# Patient Record
Sex: MALE | Race: WHITE | NOT HISPANIC OR LATINO | Employment: OTHER | URBAN - NONMETROPOLITAN AREA
[De-identification: names, ages, dates, MRNs, and addresses within clinical notes are randomized per-mention and may not be internally consistent; named-entity substitution may affect disease eponyms.]

---

## 2018-03-01 ENCOUNTER — OFFICE VISIT (OUTPATIENT)
Dept: UROLOGY | Facility: CLINIC | Age: 63
End: 2018-03-01

## 2018-03-01 VITALS — WEIGHT: 175 LBS | HEIGHT: 70 IN | BODY MASS INDEX: 25.05 KG/M2

## 2018-03-01 DIAGNOSIS — N50.82 SCROTAL PAIN: Primary | ICD-10-CM

## 2018-03-01 DIAGNOSIS — R35.0 URINARY FREQUENCY: ICD-10-CM

## 2018-03-01 DIAGNOSIS — N45.1 EPIDIDYMITIS, LEFT: ICD-10-CM

## 2018-03-01 DIAGNOSIS — N42.9 DISORDER OF PROSTATE: ICD-10-CM

## 2018-03-01 LAB
BILIRUB BLD-MCNC: NEGATIVE MG/DL
CLARITY, POC: CLEAR
COLOR UR: YELLOW
GLUCOSE UR STRIP-MCNC: NEGATIVE MG/DL
KETONES UR QL: NEGATIVE
LEUKOCYTE EST, POC: NEGATIVE
NITRITE UR-MCNC: NEGATIVE MG/ML
PH UR: 5.5 [PH] (ref 5–8)
PROT UR STRIP-MCNC: NEGATIVE MG/DL
RBC # UR STRIP: NEGATIVE /UL
SP GR UR: 1.03 (ref 1–1.03)
UROBILINOGEN UR QL: NORMAL

## 2018-03-01 PROCEDURE — 51798 US URINE CAPACITY MEASURE: CPT | Performed by: UROLOGY

## 2018-03-01 PROCEDURE — 81003 URINALYSIS AUTO W/O SCOPE: CPT | Performed by: UROLOGY

## 2018-03-01 PROCEDURE — 99214 OFFICE O/P EST MOD 30 MIN: CPT | Performed by: UROLOGY

## 2018-03-01 RX ORDER — TAMSULOSIN HYDROCHLORIDE 0.4 MG/1
1 CAPSULE ORAL NIGHTLY
Qty: 30 CAPSULE | Refills: 11 | Status: SHIPPED | OUTPATIENT
Start: 2018-03-01 | End: 2019-09-24

## 2018-03-01 RX ORDER — SULFAMETHOXAZOLE AND TRIMETHOPRIM 800; 160 MG/1; MG/1
1 TABLET ORAL 2 TIMES DAILY
Qty: 60 TABLET | Refills: 0 | Status: SHIPPED | OUTPATIENT
Start: 2018-03-01 | End: 2018-06-28 | Stop reason: HOSPADM

## 2018-03-01 RX ORDER — TAMSULOSIN HYDROCHLORIDE 0.4 MG/1
1 CAPSULE ORAL NIGHTLY
Qty: 30 CAPSULE | Refills: 11 | Status: SHIPPED | OUTPATIENT
Start: 2018-03-01 | End: 2018-03-01 | Stop reason: SDUPTHER

## 2018-03-01 RX ORDER — SULFAMETHOXAZOLE AND TRIMETHOPRIM 800; 160 MG/1; MG/1
1 TABLET ORAL 2 TIMES DAILY
Qty: 60 TABLET | Refills: 0 | Status: SHIPPED | OUTPATIENT
Start: 2018-03-01 | End: 2018-03-01 | Stop reason: SDUPTHER

## 2018-03-01 NOTE — PROGRESS NOTES
Chief Complaint:          Pt has left scrotal pain and down left leg and left back pain 8 days    HPI:   62 y.o. male.    HPI  Post void residual of 0 cc.  Pt has some hesitancy.  He has nocturia x4.  His UA today is negative.      Past Medical History:        Past Medical History:   Diagnosis Date   • Hypertension    • Kidney stone          Current Meds:     Current Outpatient Prescriptions   Medication Sig Dispense Refill   • HYDROcodone-acetaminophen (NORCO) 5-325 MG per tablet Take 1 tablet by mouth every 6 (six) hours as needed for moderate pain (4-6). 8 tablet 0   • ketorolac (TORADOL) 10 MG tablet Take 1 tablet by mouth every 6 (six) hours as needed for moderate pain (4-6). 8 tablet 0   • ondansetron ODT (ZOFRAN-ODT) 4 MG disintegrating tablet Take 1 tablet by mouth 4 (four) times a day as needed for nausea or vomiting. 12 tablet 0   • tamsulosin (FLOMAX) 0.4 MG capsule 24 hr capsule Take 1 capsule by mouth daily. 30 capsule 0     No current facility-administered medications for this visit.         Allergies:      No Known Allergies     Past Surgical History:     History reviewed. No pertinent surgical history.      Social History:     Social History     Social History   • Marital status:      Spouse name: N/A   • Number of children: N/A   • Years of education: N/A     Occupational History   • Not on file.     Social History Main Topics   • Smoking status: Never Smoker   • Smokeless tobacco: Not on file   • Alcohol use No   • Drug use: No   • Sexual activity: Yes     Partners: Female     Other Topics Concern   • Not on file     Social History Narrative       Family History:     History reviewed. No pertinent family history.    Review of Systems:     Review of Systems   Constitutional: Negative for chills, fatigue and fever.   HENT: Negative for congestion and sinus pain.    Eyes: Negative for pain.   Respiratory: Negative for cough, chest tightness and shortness of breath.    Gastrointestinal: Negative  for abdominal pain, constipation, diarrhea, nausea and vomiting.   Endocrine: Negative for cold intolerance and heat intolerance.   Genitourinary: Positive for difficulty urinating, frequency and testicular pain. Negative for dysuria and urgency.   Musculoskeletal: Positive for back pain. Negative for neck pain.   Skin: Negative for rash.   Neurological: Negative for dizziness and headaches.   Hematological: Does not bruise/bleed easily.   Psychiatric/Behavioral: The patient is nervous/anxious.        The following portions of the patient's history were reviewed and updated as appropriate:allergies, current medications, past family history, past medical history, past social history, past surgical history, problem list and ROS  Physical Exam:     Physical Exam   Constitutional: He is oriented to person, place, and time.   HENT:   Head: Normocephalic and atraumatic.   Right Ear: External ear normal.   Left Ear: External ear normal.   Nose: Nose normal.   Mouth/Throat: Oropharynx is clear and moist.   Eyes: Conjunctivae and EOM are normal. Pupils are equal, round, and reactive to light.   Neck: Normal range of motion. Neck supple. No thyromegaly present.   Cardiovascular: Normal rate, regular rhythm, normal heart sounds and intact distal pulses.    No murmur heard.  Pulmonary/Chest: Effort normal and breath sounds normal. No respiratory distress. He has no wheezes. He has no rales. He exhibits no tenderness.   Abdominal: Soft. Bowel sounds are normal. He exhibits no distension and no mass. There is no tenderness. No hernia.   Genitourinary: Rectum normal, prostate normal and penis normal.   Genitourinary Comments: Tender epididymis vs mass.   Musculoskeletal: Normal range of motion. He exhibits no edema or tenderness.   Lymphadenopathy:     He has no cervical adenopathy.   Neurological: He is alert and oriented to person, place, and time. No cranial nerve deficit. He exhibits normal muscle tone. Coordination normal.  "  Skin: Skin is warm. No rash noted.   Psychiatric: He has a normal mood and affect. His behavior is normal. Judgment and thought content normal.   Nursing note and vitals reviewed.      Ht 177.8 cm (70\")  Wt 79.4 kg (175 lb)  BMI 25.11 kg/m2   Procedure:         Assessment:     Encounter Diagnoses   Name Primary?   • Urinary frequency    • Disorder of prostate    • Scrotal pain Yes   • Epididymitis, left      Orders Placed This Encounter   Procedures   • US Scrotum & Testicles     Standing Status:   Future     Standing Expiration Date:   3/1/2019     Order Specific Question:   Reason for Exam:     Answer:   scrotal pain with epididymal mass on left.   • PSA DIAGNOSTIC     Standing Status:   Future     Standing Expiration Date:   3/1/2019   • Bladder Scan   • POC Urinalysis Dipstick, Automated       Plan:   Will check a scrotal ultrasound and will start him on septra and will see him back in 4 weeks.    Counseling was given to patient for the following topics instructions for management as follows: epididymitis. The interim medical history and current results were reviewed.  A treatment plan with follow-up was made for Scrotal pain [N50.82]. I spent 26 minutes face to face with Evan Salazar and 86 percentage was spent in counseling.    Patient's BMI is within normal parameters. No follow-up required.    This document has been electronically signed by Edvin Mars MD March 1, 2018 5:00 PM  "

## 2018-03-06 ENCOUNTER — LAB (OUTPATIENT)
Dept: UROLOGY | Facility: CLINIC | Age: 63
End: 2018-03-06

## 2018-03-06 DIAGNOSIS — N42.9 DISORDER OF PROSTATE: ICD-10-CM

## 2018-03-06 LAB — PSA SERPL-MCNC: 0.67 NG/ML (ref 0–4)

## 2018-03-06 PROCEDURE — 36415 COLL VENOUS BLD VENIPUNCTURE: CPT | Performed by: UROLOGY

## 2018-03-06 PROCEDURE — 36415 COLL VENOUS BLD VENIPUNCTURE: CPT

## 2018-03-06 PROCEDURE — 84153 ASSAY OF PSA TOTAL: CPT | Performed by: UROLOGY

## 2018-03-09 ENCOUNTER — TELEPHONE (OUTPATIENT)
Dept: UROLOGY | Facility: CLINIC | Age: 63
End: 2018-03-09

## 2018-03-14 ENCOUNTER — TELEPHONE (OUTPATIENT)
Dept: UROLOGY | Facility: CLINIC | Age: 63
End: 2018-03-14

## 2018-03-14 NOTE — TELEPHONE ENCOUNTER
Patient called stating that Bactrim was causing a rash and flu-like symptoms. Per Dr. Mars, Cipro 250 mg BID #28 with no refills was called into patient's pharmacy and patient was told to stop taking Bactrim. Patient is aware of prescription and verbalized understanding.

## 2018-03-29 ENCOUNTER — HOSPITAL ENCOUNTER (OUTPATIENT)
Dept: ULTRASOUND IMAGING | Facility: HOSPITAL | Age: 63
Discharge: HOME | End: 2018-03-29
Attending: UROLOGY | Admitting: UROLOGY

## 2018-03-29 DIAGNOSIS — N45.1 EPIDIDYMITIS, LEFT: ICD-10-CM

## 2018-03-29 DIAGNOSIS — N50.82 SCROTAL PAIN: ICD-10-CM

## 2018-03-29 PROCEDURE — 76870 US EXAM SCROTUM: CPT

## 2018-03-29 PROCEDURE — 76870 US EXAM SCROTUM: CPT | Performed by: RADIOLOGY

## 2018-04-03 ENCOUNTER — OFFICE VISIT (OUTPATIENT)
Dept: UROLOGY | Facility: CLINIC | Age: 63
End: 2018-04-03

## 2018-04-03 VITALS — HEIGHT: 70 IN | WEIGHT: 176 LBS | BODY MASS INDEX: 25.2 KG/M2

## 2018-04-03 DIAGNOSIS — N45.1 EPIDIDYMITIS, LEFT: Primary | ICD-10-CM

## 2018-04-03 PROCEDURE — 99214 OFFICE O/P EST MOD 30 MIN: CPT | Performed by: UROLOGY

## 2018-04-03 NOTE — PROGRESS NOTES
Chief Complaint:          Scrotal pain    HPI:   63 y.o. male.    HPI  Pt had a reaction to sulfa.  We changed him to cipro.  Pt is much improved about 80 to 90% improved.  Pt has an hypoechoic lesion in the lower pole region of the testes.    Past Medical History:        Past Medical History:   Diagnosis Date   • Hypertension    • Kidney stone          Current Meds:     Current Outpatient Prescriptions   Medication Sig Dispense Refill   • HYDROcodone-acetaminophen (NORCO) 5-325 MG per tablet Take 1 tablet by mouth every 6 (six) hours as needed for moderate pain (4-6). 8 tablet 0   • ketorolac (TORADOL) 10 MG tablet Take 1 tablet by mouth every 6 (six) hours as needed for moderate pain (4-6). 8 tablet 0   • ondansetron ODT (ZOFRAN-ODT) 4 MG disintegrating tablet Take 1 tablet by mouth 4 (four) times a day as needed for nausea or vomiting. 12 tablet 0   • sulfamethoxazole-trimethoprim (BACTRIM DS,SEPTRA DS) 800-160 MG per tablet Take 1 tablet by mouth 2 (Two) Times a Day. 60 tablet 0   • tamsulosin (FLOMAX) 0.4 MG capsule 24 hr capsule Take 1 capsule by mouth Every Night. 30 capsule 11     No current facility-administered medications for this visit.         Allergies:      Allergies   Allergen Reactions   • Bactrim [Sulfamethoxazole-Trimethoprim] Rash and Other (See Comments)     Flu like symptoms        Past Surgical History:     History reviewed. No pertinent surgical history.      Social History:     Social History     Social History   • Marital status:      Spouse name: N/A   • Number of children: N/A   • Years of education: N/A     Occupational History   • Not on file.     Social History Main Topics   • Smoking status: Never Smoker   • Smokeless tobacco: Not on file   • Alcohol use No   • Drug use: No   • Sexual activity: Yes     Partners: Female     Other Topics Concern   • Not on file     Social History Narrative   • No narrative on file       Family History:     History reviewed. No pertinent family  history.    Review of Systems:     Review of Systems   Constitutional: Positive for fatigue. Negative for fever.   HENT: Negative for sinus pain.    Eyes: Negative for pain.   Respiratory: Negative for chest tightness.    Cardiovascular: Negative for chest pain.   Gastrointestinal: Negative for abdominal pain.   Endocrine: Negative for heat intolerance.   Genitourinary: Negative for frequency.   Musculoskeletal: Negative for back pain.   Skin: Negative for rash.   Allergic/Immunologic: Negative for food allergies.   Neurological: Negative for headaches.   Hematological: Does not bruise/bleed easily.   Psychiatric/Behavioral: The patient is not nervous/anxious.            The following portions of the patient's history were reviewed and updated as appropriate:allergies, current medications, past family history, past medical history, past social history, past surgical history, problem list and ROS  Physical Exam:     Physical Exam   Constitutional: He is oriented to person, place, and time.   HENT:   Head: Normocephalic and atraumatic.   Right Ear: External ear normal.   Left Ear: External ear normal.   Nose: Nose normal.   Mouth/Throat: Oropharynx is clear and moist.   Eyes: Conjunctivae and EOM are normal. Pupils are equal, round, and reactive to light.   Neck: Normal range of motion. Neck supple. No thyromegaly present.   Cardiovascular: Normal rate, regular rhythm, normal heart sounds and intact distal pulses.    No murmur heard.  Pulmonary/Chest: Effort normal and breath sounds normal. No respiratory distress. He has no wheezes. He has no rales. He exhibits no tenderness.   Abdominal: Soft. Bowel sounds are normal. He exhibits no distension and no mass. There is no tenderness. No hernia.   Genitourinary: Penis normal.   Genitourinary Comments: Left fullness epididymal/testicle mass with less tenderness.   Musculoskeletal: Normal range of motion. He exhibits no edema or tenderness.   Lymphadenopathy:     He has no  "cervical adenopathy.   Neurological: He is alert and oriented to person, place, and time. No cranial nerve deficit. He exhibits normal muscle tone. Coordination normal.   Skin: Skin is warm. No rash noted.   Psychiatric: He has a normal mood and affect. His behavior is normal. Judgment and thought content normal.   Nursing note and vitals reviewed.      Ht 177.8 cm (70\")   Wt 79.8 kg (176 lb)   BMI 25.25 kg/m²    Procedure:         Assessment:     Encounter Diagnosis   Name Primary?   • Epididymitis, left Yes     No orders of the defined types were placed in this encounter.      Plan:   I think the pt has resolving epididymitis.  His ultrasound findings are likely from this.  Will reexamine pt in 2 months and rescan his testicles in 3 months.    Discussed the patient's BMI with him. BMI is within normal parameters. No follow-up required.      Counseling was given to patient for the following topics instructions for management as follows: epididymitis. The interim medical history and current results were reviewed.  A treatment plan with follow-up was made for Epididymitis, left [N45.1]. I spent 35 minutes face to face with Evan Salazar and 70 percentage was spent in counseling.    This document has been electronically signed by Edvin Mars MD April 3, 2018 4:30 PM  "

## 2018-06-05 ENCOUNTER — OFFICE VISIT (OUTPATIENT)
Dept: UROLOGY | Facility: CLINIC | Age: 63
End: 2018-06-05

## 2018-06-05 ENCOUNTER — HOSPITAL ENCOUNTER (OUTPATIENT)
Dept: ULTRASOUND IMAGING | Facility: HOSPITAL | Age: 63
Discharge: HOME | End: 2018-06-05
Admitting: UROLOGY

## 2018-06-05 VITALS — WEIGHT: 176 LBS | HEIGHT: 70 IN | BODY MASS INDEX: 25.2 KG/M2

## 2018-06-05 DIAGNOSIS — N45.1 EPIDIDYMITIS, LEFT: ICD-10-CM

## 2018-06-05 DIAGNOSIS — N45.1 EPIDIDYMITIS, LEFT: Primary | ICD-10-CM

## 2018-06-05 PROCEDURE — 76870 US EXAM SCROTUM: CPT | Performed by: RADIOLOGY

## 2018-06-05 PROCEDURE — 76870 US EXAM SCROTUM: CPT

## 2018-06-05 PROCEDURE — 99213 OFFICE O/P EST LOW 20 MIN: CPT | Performed by: UROLOGY

## 2018-06-05 NOTE — PROGRESS NOTES
Chief Complaint:          Epididymitis left    HPI:   63 y.o. male.  Pt is feeling much better.  Pt was tender for about a month  HPI      Past Medical History:        Past Medical History:   Diagnosis Date   • Hypertension    • Kidney stone          Current Meds:     Current Outpatient Prescriptions   Medication Sig Dispense Refill   • HYDROcodone-acetaminophen (NORCO) 5-325 MG per tablet Take 1 tablet by mouth every 6 (six) hours as needed for moderate pain (4-6). 8 tablet 0   • ketorolac (TORADOL) 10 MG tablet Take 1 tablet by mouth every 6 (six) hours as needed for moderate pain (4-6). 8 tablet 0   • ondansetron ODT (ZOFRAN-ODT) 4 MG disintegrating tablet Take 1 tablet by mouth 4 (four) times a day as needed for nausea or vomiting. 12 tablet 0   • sulfamethoxazole-trimethoprim (BACTRIM DS,SEPTRA DS) 800-160 MG per tablet Take 1 tablet by mouth 2 (Two) Times a Day. 60 tablet 0   • tamsulosin (FLOMAX) 0.4 MG capsule 24 hr capsule Take 1 capsule by mouth Every Night. 30 capsule 11     No current facility-administered medications for this visit.         Allergies:      Allergies   Allergen Reactions   • Bactrim [Sulfamethoxazole-Trimethoprim] Rash and Other (See Comments)     Flu like symptoms        Past Surgical History:     History reviewed. No pertinent surgical history.      Social History:     Social History     Social History   • Marital status:      Spouse name: N/A   • Number of children: N/A   • Years of education: N/A     Occupational History   • Not on file.     Social History Main Topics   • Smoking status: Never Smoker   • Smokeless tobacco: Never Used   • Alcohol use No   • Drug use: No   • Sexual activity: Yes     Partners: Female     Other Topics Concern   • Not on file     Social History Narrative   • No narrative on file       Family History:     Family History   Problem Relation Age of Onset   • No Known Problems Father    • No Known Problems Mother        Review of Systems:     Review of  Systems   Constitutional: Negative for chills, fatigue and fever.   HENT: Negative for congestion and sinus pressure.    Respiratory: Negative for shortness of breath.    Cardiovascular: Negative for chest pain.   Gastrointestinal: Negative for abdominal pain, constipation, diarrhea, nausea and vomiting.   Genitourinary: Negative for frequency and urgency.   Musculoskeletal: Negative for back pain and neck pain.   Neurological: Negative for dizziness and headaches.   Hematological: Does not bruise/bleed easily.   Psychiatric/Behavioral: The patient is not nervous/anxious.            I have reviewed the follow portions of the patient's history and confirmed they are accurate today:  allergies, current medications, past family history, past medical history, past social history, past surgical history, problem list and ROS  Physical Exam:     Physical Exam   Constitutional: He is oriented to person, place, and time.   HENT:   Head: Normocephalic and atraumatic.   Right Ear: External ear normal.   Left Ear: External ear normal.   Nose: Nose normal.   Mouth/Throat: Oropharynx is clear and moist.   Eyes: Conjunctivae and EOM are normal. Pupils are equal, round, and reactive to light.   Neck: Normal range of motion. Neck supple. No thyromegaly present.   Cardiovascular: Normal rate, regular rhythm, normal heart sounds and intact distal pulses.    No murmur heard.  Pulmonary/Chest: Effort normal and breath sounds normal. No respiratory distress. He has no wheezes. He has no rales. He exhibits no tenderness.   Abdominal: Soft. Bowel sounds are normal. He exhibits no distension and no mass. There is no tenderness. No hernia.   Genitourinary: Rectum normal, prostate normal and penis normal.   Genitourinary Comments: Pt still has fluid about his left testicle and there is a firm area in the bottom posterior aspect of the left testicle.  It is not tender.   Musculoskeletal: Normal range of motion. He exhibits no edema or  "tenderness.   Lymphadenopathy:     He has no cervical adenopathy.   Neurological: He is alert and oriented to person, place, and time. No cranial nerve deficit. He exhibits normal muscle tone. Coordination normal.   Skin: Skin is warm. No rash noted.   Psychiatric: He has a normal mood and affect. His behavior is normal. Judgment and thought content normal.   Nursing note and vitals reviewed.      Ht 177.8 cm (70\")   Wt 79.8 kg (176 lb)   BMI 25.25 kg/m²    Procedure:         Assessment:     Encounter Diagnosis   Name Primary?   • Epididymitis, left Yes     No orders of the defined types were placed in this encounter.      Plan:   I think we should repeat his testicular ultrasound and return in a week and if it is persistent and the same as the one 3 months ago we should consider surgical exploration    Patient's Body mass index is 25.25 kg/m². BMI is within normal parameters. No follow-up required.      Counseling was given to patient for the following topics impressions as follows: hydrocele and testicular mass on the left. The interim medical history and current results were reviewed.  A treatment plan with follow-up was made for Epididymitis, left [N45.1].  This document has been electronically signed by Edvin Mars MD June 5, 2018 3:18 PM  "

## 2018-06-12 ENCOUNTER — OFFICE VISIT (OUTPATIENT)
Dept: UROLOGY | Facility: CLINIC | Age: 63
End: 2018-06-12

## 2018-06-12 VITALS — BODY MASS INDEX: 25.2 KG/M2 | HEIGHT: 70 IN | WEIGHT: 176 LBS

## 2018-06-12 DIAGNOSIS — N50.89 MASS OF LEFT TESTICLE: Primary | ICD-10-CM

## 2018-06-12 LAB
HCG SERPL QL: NEGATIVE
LDH SERPL-CCNC: 194 U/L (ref 135–225)

## 2018-06-12 PROCEDURE — 84703 CHORIONIC GONADOTROPIN ASSAY: CPT | Performed by: UROLOGY

## 2018-06-12 PROCEDURE — 82105 ALPHA-FETOPROTEIN SERUM: CPT | Performed by: UROLOGY

## 2018-06-12 PROCEDURE — 83615 LACTATE (LD) (LDH) ENZYME: CPT | Performed by: UROLOGY

## 2018-06-12 PROCEDURE — 99214 OFFICE O/P EST MOD 30 MIN: CPT | Performed by: UROLOGY

## 2018-06-12 PROCEDURE — 36415 COLL VENOUS BLD VENIPUNCTURE: CPT | Performed by: UROLOGY

## 2018-06-12 NOTE — PROGRESS NOTES
Chief Complaint:          Firmness on exam and ultrasound repeated.    HPI:   63 y.o. male.    HPI  Ultrasound shows 14 mm lower pole echo pattern. It has not improved ultrasonically    Past Medical History:        Past Medical History:   Diagnosis Date   • Hypertension    • Kidney stone          Current Meds:     Current Outpatient Prescriptions   Medication Sig Dispense Refill   • HYDROcodone-acetaminophen (NORCO) 5-325 MG per tablet Take 1 tablet by mouth every 6 (six) hours as needed for moderate pain (4-6). 8 tablet 0   • ketorolac (TORADOL) 10 MG tablet Take 1 tablet by mouth every 6 (six) hours as needed for moderate pain (4-6). 8 tablet 0   • ondansetron ODT (ZOFRAN-ODT) 4 MG disintegrating tablet Take 1 tablet by mouth 4 (four) times a day as needed for nausea or vomiting. 12 tablet 0   • sulfamethoxazole-trimethoprim (BACTRIM DS,SEPTRA DS) 800-160 MG per tablet Take 1 tablet by mouth 2 (Two) Times a Day. 60 tablet 0   • tamsulosin (FLOMAX) 0.4 MG capsule 24 hr capsule Take 1 capsule by mouth Every Night. 30 capsule 11     No current facility-administered medications for this visit.         Allergies:      Allergies   Allergen Reactions   • Bactrim [Sulfamethoxazole-Trimethoprim] Rash and Other (See Comments)     Flu like symptoms        Past Surgical History:     History reviewed. No pertinent surgical history.      Social History:     Social History     Social History   • Marital status:      Spouse name: N/A   • Number of children: N/A   • Years of education: N/A     Occupational History   • Not on file.     Social History Main Topics   • Smoking status: Never Smoker   • Smokeless tobacco: Never Used   • Alcohol use No   • Drug use: No   • Sexual activity: Yes     Partners: Female     Other Topics Concern   • Not on file     Social History Narrative   • No narrative on file       Family History:     Family History   Problem Relation Age of Onset   • No Known Problems Father    • No Known Problems  Mother        Review of Systems:     Review of Systems   Constitutional: Negative for chills, fatigue and fever.   HENT: Negative for congestion and sinus pressure.    Respiratory: Negative for shortness of breath.    Cardiovascular: Negative for chest pain.   Gastrointestinal: Negative for abdominal pain, constipation, diarrhea, nausea and vomiting.   Genitourinary: Negative for frequency and urgency.   Musculoskeletal: Negative for back pain and neck pain.   Neurological: Negative for dizziness and headaches.   Hematological: Does not bruise/bleed easily.   Psychiatric/Behavioral: The patient is not nervous/anxious.            I have reviewed the follow portions of the patient's history and confirmed they are accurate today:  allergies, current medications, past family history, past medical history, past social history, past surgical history, problem list and ROS  Physical Exam:     Physical Exam   Constitutional: He is oriented to person, place, and time.   HENT:   Head: Normocephalic and atraumatic.   Right Ear: External ear normal.   Left Ear: External ear normal.   Nose: Nose normal.   Mouth/Throat: Oropharynx is clear and moist.   Eyes: Conjunctivae and EOM are normal. Pupils are equal, round, and reactive to light.   Neck: Normal range of motion. Neck supple. No thyromegaly present.   Cardiovascular: Normal rate, regular rhythm, normal heart sounds and intact distal pulses.    No murmur heard.  Pulmonary/Chest: Effort normal and breath sounds normal. No respiratory distress. He has no wheezes. He has no rales. He exhibits no tenderness.   Abdominal: Soft. Bowel sounds are normal. He exhibits no distension and no mass. There is no tenderness. No hernia.   Genitourinary: Rectum normal, prostate normal and penis normal.   Genitourinary Comments: Lower pole left testicle mass about 2 cm.  Hydrocele about left testicle   Musculoskeletal: Normal range of motion. He exhibits no edema or tenderness.  "  Lymphadenopathy:     He has no cervical adenopathy.   Neurological: He is alert and oriented to person, place, and time. No cranial nerve deficit. He exhibits normal muscle tone. Coordination normal.   Skin: Skin is warm. No rash noted.   Psychiatric: He has a normal mood and affect. His behavior is normal. Judgment and thought content normal.   Nursing note and vitals reviewed.      Ht 177.8 cm (70\")   Wt 79.8 kg (176 lb)   BMI 25.25 kg/m²    Procedure:         Assessment:     Encounter Diagnosis   Name Primary?   • Mass of left testicle Yes     No orders of the defined types were placed in this encounter.      Plan:   I would recommend left inguinal scrotal testicle exploration with frozen sections and possible orchiectomy  Tumor markers sent.    Patient's Body mass index is 25.25 kg/m². BMI is within normal parameters. No follow-up required.      Counseling was given to patient for the following topics diagnostic results including: ultrasound. The interim medical history and current results were reviewed.  A treatment plan with follow-up was made for Mass of left testicle [N50.9]  This document has been electronically signed by Edvin Mars MD June 12, 2018 3:22 PM  "

## 2018-06-14 LAB — AFP-TM SERPL-MCNC: 2.3 NG/ML (ref 0–8.3)

## 2018-06-25 ENCOUNTER — TELEPHONE (OUTPATIENT)
Dept: UROLOGY | Facility: CLINIC | Age: 63
End: 2018-06-25

## 2018-06-26 PROBLEM — N50.89 MASS OF LEFT TESTICLE: Status: ACTIVE | Noted: 2018-06-26

## 2018-06-27 ENCOUNTER — APPOINTMENT (OUTPATIENT)
Dept: PREADMISSION TESTING | Facility: HOSPITAL | Age: 63
End: 2018-06-27

## 2018-06-27 DIAGNOSIS — N50.89 MASS OF LEFT TESTICLE: ICD-10-CM

## 2018-06-27 LAB
DEPRECATED RDW RBC AUTO: 38.7 FL (ref 37–54)
ERYTHROCYTE [DISTWIDTH] IN BLOOD BY AUTOMATED COUNT: 12.8 % (ref 11.5–14.5)
HCT VFR BLD AUTO: 38 % (ref 42–52)
HGB BLD-MCNC: 13.4 G/DL (ref 14–18)
MCH RBC QN AUTO: 30 PG (ref 27–33)
MCHC RBC AUTO-ENTMCNC: 35.3 G/DL (ref 33–37)
MCV RBC AUTO: 85.2 FL (ref 80–94)
PLATELET # BLD AUTO: 172 10*3/MM3 (ref 130–400)
PMV BLD AUTO: 10.5 FL (ref 6–10)
RBC # BLD AUTO: 4.46 10*6/MM3 (ref 4.7–6.1)
WBC NRBC COR # BLD: 5.25 10*3/MM3 (ref 4.5–12.5)

## 2018-06-27 PROCEDURE — 36415 COLL VENOUS BLD VENIPUNCTURE: CPT

## 2018-06-27 PROCEDURE — 85027 COMPLETE CBC AUTOMATED: CPT | Performed by: UROLOGY

## 2018-06-28 ENCOUNTER — HOSPITAL ENCOUNTER (OUTPATIENT)
Facility: HOSPITAL | Age: 63
Setting detail: HOSPITAL OUTPATIENT SURGERY
Discharge: HOME | End: 2018-06-28
Attending: UROLOGY | Admitting: UROLOGY

## 2018-06-28 ENCOUNTER — ANESTHESIA EVENT (OUTPATIENT)
Dept: PERIOP | Facility: HOSPITAL | Age: 63
End: 2018-06-28

## 2018-06-28 ENCOUNTER — ANESTHESIA (OUTPATIENT)
Dept: PERIOP | Facility: HOSPITAL | Age: 63
End: 2018-06-28

## 2018-06-28 VITALS
DIASTOLIC BLOOD PRESSURE: 64 MMHG | SYSTOLIC BLOOD PRESSURE: 108 MMHG | HEIGHT: 70 IN | TEMPERATURE: 97.5 F | RESPIRATION RATE: 14 BRPM | HEART RATE: 62 BPM | OXYGEN SATURATION: 98 % | WEIGHT: 180 LBS | BODY MASS INDEX: 25.77 KG/M2

## 2018-06-28 DIAGNOSIS — N50.89 MASS OF LEFT TESTICLE: ICD-10-CM

## 2018-06-28 PROCEDURE — 25010000002 FENTANYL CITRATE (PF) 100 MCG/2ML SOLUTION: Performed by: NURSE ANESTHETIST, CERTIFIED REGISTERED

## 2018-06-28 PROCEDURE — 25010000002 MIDAZOLAM PER 1 MG: Performed by: NURSE ANESTHETIST, CERTIFIED REGISTERED

## 2018-06-28 PROCEDURE — 25010000002 PROPOFOL 10 MG/ML EMULSION: Performed by: NURSE ANESTHETIST, CERTIFIED REGISTERED

## 2018-06-28 PROCEDURE — 88331 PATH CONSLTJ SURG 1 BLK 1SPC: CPT | Performed by: PATHOLOGY

## 2018-06-28 PROCEDURE — 54830 REMOVE EPIDIDYMIS LESION: CPT | Performed by: UROLOGY

## 2018-06-28 RX ORDER — PROPOFOL 10 MG/ML
VIAL (ML) INTRAVENOUS AS NEEDED
Status: DISCONTINUED | OUTPATIENT
Start: 2018-06-28 | End: 2018-06-28 | Stop reason: SURG

## 2018-06-28 RX ORDER — BUPIVACAINE HYDROCHLORIDE 5 MG/ML
INJECTION, SOLUTION PERINEURAL AS NEEDED
Status: DISCONTINUED | OUTPATIENT
Start: 2018-06-28 | End: 2018-06-28 | Stop reason: HOSPADM

## 2018-06-28 RX ORDER — OXYCODONE HYDROCHLORIDE AND ACETAMINOPHEN 5; 325 MG/1; MG/1
1 TABLET ORAL ONCE AS NEEDED
Status: DISCONTINUED | OUTPATIENT
Start: 2018-06-28 | End: 2018-06-28 | Stop reason: HOSPADM

## 2018-06-28 RX ORDER — MEPERIDINE HYDROCHLORIDE 50 MG/ML
12.5 INJECTION INTRAMUSCULAR; INTRAVENOUS; SUBCUTANEOUS
Status: DISCONTINUED | OUTPATIENT
Start: 2018-06-28 | End: 2018-06-28 | Stop reason: HOSPADM

## 2018-06-28 RX ORDER — LIDOCAINE HYDROCHLORIDE 20 MG/ML
INJECTION, SOLUTION EPIDURAL; INFILTRATION; INTRACAUDAL; PERINEURAL AS NEEDED
Status: DISCONTINUED | OUTPATIENT
Start: 2018-06-28 | End: 2018-06-28 | Stop reason: SURG

## 2018-06-28 RX ORDER — CEPHALEXIN 250 MG/1
250 CAPSULE ORAL 3 TIMES DAILY
Qty: 21 CAPSULE | Refills: 0 | Status: SHIPPED | OUTPATIENT
Start: 2018-06-28 | End: 2019-09-24

## 2018-06-28 RX ORDER — MAGNESIUM HYDROXIDE 1200 MG/15ML
LIQUID ORAL AS NEEDED
Status: DISCONTINUED | OUTPATIENT
Start: 2018-06-28 | End: 2018-06-28 | Stop reason: HOSPADM

## 2018-06-28 RX ORDER — MIDAZOLAM HYDROCHLORIDE 1 MG/ML
INJECTION INTRAMUSCULAR; INTRAVENOUS AS NEEDED
Status: DISCONTINUED | OUTPATIENT
Start: 2018-06-28 | End: 2018-06-28 | Stop reason: SURG

## 2018-06-28 RX ORDER — VERAPAMIL HYDROCHLORIDE 2.5 MG/ML
INJECTION, SOLUTION INTRAVENOUS AS NEEDED
Status: DISCONTINUED | OUTPATIENT
Start: 2018-06-28 | End: 2018-06-28 | Stop reason: SURG

## 2018-06-28 RX ORDER — SODIUM CHLORIDE 0.9 % (FLUSH) 0.9 %
1-10 SYRINGE (ML) INJECTION AS NEEDED
Status: DISCONTINUED | OUTPATIENT
Start: 2018-06-28 | End: 2018-06-28 | Stop reason: HOSPADM

## 2018-06-28 RX ORDER — ONDANSETRON 2 MG/ML
4 INJECTION INTRAMUSCULAR; INTRAVENOUS ONCE AS NEEDED
Status: DISCONTINUED | OUTPATIENT
Start: 2018-06-28 | End: 2018-06-28 | Stop reason: HOSPADM

## 2018-06-28 RX ORDER — IPRATROPIUM BROMIDE AND ALBUTEROL SULFATE 2.5; .5 MG/3ML; MG/3ML
3 SOLUTION RESPIRATORY (INHALATION) ONCE AS NEEDED
Status: DISCONTINUED | OUTPATIENT
Start: 2018-06-28 | End: 2018-06-28 | Stop reason: HOSPADM

## 2018-06-28 RX ORDER — OXYCODONE HYDROCHLORIDE AND ACETAMINOPHEN 5; 325 MG/1; MG/1
TABLET ORAL
Qty: 40 TABLET | Refills: 0 | Status: SHIPPED | OUTPATIENT
Start: 2018-06-28 | End: 2019-09-24

## 2018-06-28 RX ORDER — FENTANYL CITRATE 50 UG/ML
INJECTION, SOLUTION INTRAMUSCULAR; INTRAVENOUS AS NEEDED
Status: DISCONTINUED | OUTPATIENT
Start: 2018-06-28 | End: 2018-06-28 | Stop reason: SURG

## 2018-06-28 RX ORDER — SODIUM CHLORIDE, SODIUM LACTATE, POTASSIUM CHLORIDE, CALCIUM CHLORIDE 600; 310; 30; 20 MG/100ML; MG/100ML; MG/100ML; MG/100ML
125 INJECTION, SOLUTION INTRAVENOUS CONTINUOUS
Status: DISCONTINUED | OUTPATIENT
Start: 2018-06-28 | End: 2018-06-28 | Stop reason: HOSPADM

## 2018-06-28 RX ADMIN — AZTREONAM 2 G: 2 INJECTION, POWDER, FOR SOLUTION INTRAMUSCULAR; INTRAVENOUS at 07:30

## 2018-06-28 RX ADMIN — FENTANYL CITRATE 50 MCG: 50 INJECTION INTRAMUSCULAR; INTRAVENOUS at 07:50

## 2018-06-28 RX ADMIN — SODIUM CHLORIDE, POTASSIUM CHLORIDE, SODIUM LACTATE AND CALCIUM CHLORIDE: 600; 310; 30; 20 INJECTION, SOLUTION INTRAVENOUS at 08:25

## 2018-06-28 RX ADMIN — VERAPAMIL HYDROCHLORIDE 5 MG: 2.5 INJECTION, SOLUTION INTRAVENOUS at 07:19

## 2018-06-28 RX ADMIN — PROPOFOL 200 MG: 10 INJECTION, EMULSION INTRAVENOUS at 07:35

## 2018-06-28 RX ADMIN — LIDOCAINE HYDROCHLORIDE 3 ML: 20 INJECTION, SOLUTION EPIDURAL; INFILTRATION; INTRACAUDAL; PERINEURAL at 07:33

## 2018-06-28 RX ADMIN — MIDAZOLAM HYDROCHLORIDE 2 MG: 1 INJECTION, SOLUTION INTRAMUSCULAR; INTRAVENOUS at 07:30

## 2018-06-28 RX ADMIN — SODIUM CHLORIDE, POTASSIUM CHLORIDE, SODIUM LACTATE AND CALCIUM CHLORIDE: 600; 310; 30; 20 INJECTION, SOLUTION INTRAVENOUS at 07:09

## 2018-06-28 RX ADMIN — FENTANYL CITRATE 50 MCG: 50 INJECTION INTRAMUSCULAR; INTRAVENOUS at 07:41

## 2018-06-28 NOTE — ANESTHESIA PROCEDURE NOTES
Airway  Urgency: elective    Date/Time: 6/28/2018 7:36 AM  End Time:6/28/2018 7:36 AM    General Information and Staff    Patient location during procedure: OR  Anesthesiologist: ALYSHA ANDERSON  CRNA: PAZ WATKINS    Indications and Patient Condition  Indications for airway management: airway protection    Preoxygenated: yes  MILS maintained throughout  Mask difficulty assessment: 0 - not attempted    Final Airway Details  Final airway type: supraglottic airway      Successful airway: unique  Size 4  Airway Seal Pressure (cm H2O): 20    Number of attempts at approach: 1    Additional Comments  Atraumatic

## 2018-06-28 NOTE — ANESTHESIA PREPROCEDURE EVALUATION
Anesthesia Evaluation     Patient summary reviewed and Nursing notes reviewed   no history of anesthetic complications:  NPO Solid Status: > 8 hours  NPO Liquid Status: > 8 hours           Airway   Mallampati: II  TM distance: >3 FB  Neck ROM: full  no difficulty expected  Dental - normal exam     Pulmonary - normal exam   (+) a smoker,   Cardiovascular - negative cardio ROS and normal exam  Exercise tolerance: good (4-7 METS)    NYHA Classification: II        Neuro/Psych  (+) psychiatric history Anxiety,     GI/Hepatic/Renal/Endo    (+)  GERD,  renal disease stones,     Musculoskeletal     Abdominal  - normal exam    Bowel sounds: normal.   Substance History - negative use     OB/GYN negative ob/gyn ROS         Other   (+) arthritis                     Anesthesia Plan    ASA 2     general     intravenous induction   Anesthetic plan and risks discussed with patient and spouse/significant other.  Use of blood products discussed with patient and spouse/significant other  Consented to blood products.

## 2018-06-28 NOTE — ANESTHESIA POSTPROCEDURE EVALUATION
Patient: Evan Salazar    Procedure Summary     Date:  06/28/18 Room / Location:  [facility] OR 06 /  [facility] OR    Anesthesia Start:  0730 Anesthesia Stop:  0829    Procedure:  SCROTAL EXPLORATION/ INGUINAL EXPLORATION HEMIORCHICETOMY WITH FROZEN SECTION. (Left Scrotum) Diagnosis:       Mass of left testicle      (Mass of left testicle [N50.9])    Surgeon:  Edvin Mars MD Provider:  Mani Moser MD    Anesthesia Type:  general ASA Status:  2          Anesthesia Type: general  Last vitals  BP   150/85 (bp meds previously given by dr moser) (06/28/18 0724)   Temp   98.2 °F (36.8 °C) (06/28/18 0645)   Pulse   69 (06/28/18 0645)   Resp   18 (06/28/18 0645)     SpO2   97 % (06/28/18 0645)     Post Anesthesia Care and Evaluation    Patient location during evaluation: PHASE II  Patient participation: complete - patient participated  Level of consciousness: awake and alert  Pain score: 1  Pain management: adequate  Airway patency: patent  Anesthetic complications: No anesthetic complications  PONV Status: controlled  Cardiovascular status: acceptable  Respiratory status: acceptable  Hydration status: acceptable

## 2018-07-02 LAB
LAB AP CASE REPORT: NORMAL
Lab: NORMAL
PATH REPORT.FINAL DX SPEC: NORMAL
PATH REPORT.GROSS SPEC: NORMAL

## 2018-07-18 ENCOUNTER — OFFICE VISIT (OUTPATIENT)
Dept: UROLOGY | Facility: CLINIC | Age: 63
End: 2018-07-18

## 2018-07-18 VITALS — HEIGHT: 70 IN | WEIGHT: 180 LBS | BODY MASS INDEX: 25.77 KG/M2

## 2018-07-18 DIAGNOSIS — N50.3 EPIDIDYMAL CYST: Primary | ICD-10-CM

## 2018-07-18 PROCEDURE — 99024 POSTOP FOLLOW-UP VISIT: CPT | Performed by: UROLOGY

## 2018-07-18 NOTE — PROGRESS NOTES
Chief Complaint:          Testicular mass    HPI:   63 y.o. male.  His pathology showed epididymal cyst.  No cancer.  Pt has had a little hard time getting his energy back.  HPI      Past Medical History:        Past Medical History:   Diagnosis Date   • Anxiety    • Arthritis    • Epididymitis    • GERD (gastroesophageal reflux disease)    • Heartburn    • Hydrocele    • Kidney stone          Current Meds:     Current Outpatient Prescriptions   Medication Sig Dispense Refill   • esomeprazole (nexIUM) 20 MG capsule Take 20 mg by mouth Every Other Day.     • tamsulosin (FLOMAX) 0.4 MG capsule 24 hr capsule Take 1 capsule by mouth Every Night. 30 capsule 11   • cephalexin (KEFLEX) 250 MG capsule Take 1 capsule by mouth 3 (Three) Times a Day. 21 capsule 0   • oxyCODONE-acetaminophen (PERCOCET) 5-325 MG per tablet 1 to 2 Tablets Every 6 Hours as needed for PAIN 40 tablet 0     No current facility-administered medications for this visit.         Allergies:      Allergies   Allergen Reactions   • Bactrim [Sulfamethoxazole-Trimethoprim] Rash and Other (See Comments)     Flu like symptoms        Past Surgical History:     Past Surgical History:   Procedure Laterality Date   • COLONOSCOPY     • CYSTOSCOPY W/ URETEROSCOPY W/ LITHOTRIPSY     • FINGER SURGERY     • HERNIA REPAIR     • SCROTAL EXPLORATION Left 6/28/2018    Procedure: SCROTAL EXPLORATION/ INGUINAL EXPLORATION HEMIORCHICETOMY WITH FROZEN SECTION.;  Surgeon: Edvin Mars MD;  Location: [facility];  Service: Urology         Social History:     Social History     Social History   • Marital status:      Spouse name: N/A   • Number of children: N/A   • Years of education: N/A     Occupational History   • Not on file.     Social History Main Topics   • Smoking status: Never Smoker   • Smokeless tobacco: Never Used   • Alcohol use No   • Drug use: No   • Sexual activity: Yes     Partners: Female     Other Topics Concern   • Not on file     Social History  Narrative   • No narrative on file       Family History:     Family History   Problem Relation Age of Onset   • No Known Problems Father    • No Known Problems Mother        Review of Systems:     Review of Systems   Constitutional: Negative for chills, fatigue and fever.   Respiratory: Negative for cough, shortness of breath and wheezing.    Cardiovascular: Negative for leg swelling.   Gastrointestinal: Negative for abdominal pain, nausea and vomiting.   Musculoskeletal: Negative for back pain and joint swelling.   Neurological: Negative for dizziness and headaches.   Psychiatric/Behavioral: Negative for confusion.           I have reviewed the follow portions of the patient's history and confirmed they are accurate today:  allergies, current medications, past family history, past medical history, past social history, past surgical history, problem list and ROS  Physical Exam:     Physical Exam   Constitutional: He is oriented to person, place, and time.   HENT:   Head: Normocephalic and atraumatic.   Right Ear: External ear normal.   Left Ear: External ear normal.   Nose: Nose normal.   Mouth/Throat: Oropharynx is clear and moist.   Eyes: Pupils are equal, round, and reactive to light. Conjunctivae and EOM are normal.   Neck: Normal range of motion. Neck supple. No thyromegaly present.   Cardiovascular: Normal rate, regular rhythm, normal heart sounds and intact distal pulses.    No murmur heard.  Pulmonary/Chest: Effort normal and breath sounds normal. No respiratory distress. He has no wheezes. He has no rales. He exhibits no tenderness.   Abdominal: Soft. Bowel sounds are normal. He exhibits no distension and no mass. There is no tenderness. No hernia.   Genitourinary: Rectum normal, prostate normal and penis normal.   Genitourinary Comments: Pt's wound is without infection.   Musculoskeletal: Normal range of motion. He exhibits no edema or tenderness.   Lymphadenopathy:     He has no cervical adenopathy.  "  Neurological: He is alert and oriented to person, place, and time. No cranial nerve deficit. He exhibits normal muscle tone. Coordination normal.   Skin: Skin is warm. No rash noted.   Psychiatric: He has a normal mood and affect. His behavior is normal. Judgment and thought content normal.   Nursing note and vitals reviewed.      Ht 177.8 cm (70\")   Wt 81.6 kg (180 lb)   BMI 25.83 kg/m²    Procedure:         Assessment:     Encounter Diagnosis   Name Primary?   • Epididymal cyst Yes     No orders of the defined types were placed in this encounter.      Plan:   Will see pt back in 6 weeks and if he is low on energy then we could consider checking a testosterone.    Patient's Body mass index is 25.83 kg/m². BMI is within normal parameters. No follow-up required.      Counseling was given to patient for the following topics instructions for management as follows: surgery revovery. The interim medical history and current results were reviewed.  A treatment plan with follow-up was made for Epididymal cyst [N50.3].  This document has been electronically signed by Edvin Mars MD July 18, 2018 10:57 AM  "

## 2018-08-14 ENCOUNTER — OFFICE VISIT (OUTPATIENT)
Dept: UROLOGY | Facility: CLINIC | Age: 63
End: 2018-08-14

## 2018-08-14 VITALS — HEIGHT: 70 IN | BODY MASS INDEX: 25.77 KG/M2 | WEIGHT: 180 LBS

## 2018-08-14 DIAGNOSIS — R53.83 LETHARGY: Primary | ICD-10-CM

## 2018-08-14 LAB
DEPRECATED RDW RBC AUTO: 43.5 FL (ref 37–54)
ERYTHROCYTE [DISTWIDTH] IN BLOOD BY AUTOMATED COUNT: 14.1 % (ref 11.5–14.5)
HCT VFR BLD AUTO: 33.8 % (ref 42–52)
HGB BLD-MCNC: 11.5 G/DL (ref 14–18)
MCH RBC QN AUTO: 30.3 PG (ref 27–33)
MCHC RBC AUTO-ENTMCNC: 34 G/DL (ref 33–37)
MCV RBC AUTO: 88.9 FL (ref 80–94)
PLATELET # BLD AUTO: 153 10*3/MM3 (ref 130–400)
PMV BLD AUTO: 10.5 FL (ref 6–10)
RBC # BLD AUTO: 3.8 10*6/MM3 (ref 4.7–6.1)
TESTOST SERPL-MCNC: 212.79 NG/DL (ref 86.98–780.1)
TSH SERPL DL<=0.05 MIU/L-ACNC: 1.02 MIU/ML (ref 0.55–4.78)
WBC NRBC COR # BLD: 5.23 10*3/MM3 (ref 4.5–12.5)

## 2018-08-14 PROCEDURE — 84443 ASSAY THYROID STIM HORMONE: CPT | Performed by: UROLOGY

## 2018-08-14 PROCEDURE — 99213 OFFICE O/P EST LOW 20 MIN: CPT | Performed by: UROLOGY

## 2018-08-14 PROCEDURE — 85027 COMPLETE CBC AUTOMATED: CPT | Performed by: UROLOGY

## 2018-08-14 PROCEDURE — 36415 COLL VENOUS BLD VENIPUNCTURE: CPT | Performed by: UROLOGY

## 2018-08-14 PROCEDURE — 84403 ASSAY OF TOTAL TESTOSTERONE: CPT | Performed by: UROLOGY

## 2018-08-14 NOTE — PROGRESS NOTES
Chief Complaint:          testicular benign mas    HPI:   63 y.o. male.  PT is weak and tired a lot.  He has to take motrin and he is tired all the time  HPI      Past Medical History:        Past Medical History:   Diagnosis Date   • Anxiety    • Arthritis    • Epididymitis    • GERD (gastroesophageal reflux disease)    • Heartburn    • Hydrocele    • Kidney stone          Current Meds:     Current Outpatient Prescriptions   Medication Sig Dispense Refill   • cephalexin (KEFLEX) 250 MG capsule Take 1 capsule by mouth 3 (Three) Times a Day. 21 capsule 0   • esomeprazole (nexIUM) 20 MG capsule Take 20 mg by mouth Every Other Day.     • oxyCODONE-acetaminophen (PERCOCET) 5-325 MG per tablet 1 to 2 Tablets Every 6 Hours as needed for PAIN 40 tablet 0   • tamsulosin (FLOMAX) 0.4 MG capsule 24 hr capsule Take 1 capsule by mouth Every Night. 30 capsule 11     No current facility-administered medications for this visit.         Allergies:      Allergies   Allergen Reactions   • Bactrim [Sulfamethoxazole-Trimethoprim] Rash and Other (See Comments)     Flu like symptoms        Past Surgical History:     Past Surgical History:   Procedure Laterality Date   • COLONOSCOPY     • CYSTOSCOPY W/ URETEROSCOPY W/ LITHOTRIPSY     • FINGER SURGERY     • HERNIA REPAIR     • SCROTAL EXPLORATION Left 6/28/2018    Procedure: SCROTAL EXPLORATION/ INGUINAL EXPLORATION HEMIORCHICETOMY WITH FROZEN SECTION.;  Surgeon: Edvin Mars MD;  Location: [facility];  Service: Urology         Social History:     Social History     Social History   • Marital status:      Spouse name: N/A   • Number of children: N/A   • Years of education: N/A     Occupational History   • Not on file.     Social History Main Topics   • Smoking status: Never Smoker   • Smokeless tobacco: Never Used   • Alcohol use No   • Drug use: No   • Sexual activity: Yes     Partners: Female     Other Topics Concern   • Not on file     Social History Narrative   • No  narrative on file       Family History:     Family History   Problem Relation Age of Onset   • No Known Problems Father    • No Known Problems Mother        Review of Systems:     Review of Systems   Constitutional: Positive for fatigue.   HENT: Negative for sinus pain.    Eyes: Negative for pain.   Respiratory: Negative for chest tightness.    Cardiovascular: Negative for chest pain.   Gastrointestinal: Negative for abdominal pain.   Endocrine: Negative for heat intolerance.   Genitourinary: Negative for frequency.   Musculoskeletal: Negative for back pain.   Skin: Negative for rash.   Allergic/Immunologic: Negative for food allergies.   Neurological: Negative for headaches.   Hematological: Does not bruise/bleed easily.   Psychiatric/Behavioral: The patient is not nervous/anxious.        I have reviewed the follow portions of the patient's history and confirmed they are accurate today:  allergies, current medications, past family history, past medical history, past social history, past surgical history, problem list and ROS  Physical Exam:     Physical Exam   Constitutional: He is oriented to person, place, and time.   HENT:   Head: Normocephalic and atraumatic.   Right Ear: External ear normal.   Left Ear: External ear normal.   Nose: Nose normal.   Mouth/Throat: Oropharynx is clear and moist.   Eyes: Pupils are equal, round, and reactive to light. Conjunctivae and EOM are normal.   Neck: Normal range of motion. Neck supple. No thyromegaly present.   Cardiovascular: Normal rate, regular rhythm, normal heart sounds and intact distal pulses.    No murmur heard.  Pulmonary/Chest: Effort normal and breath sounds normal. No respiratory distress. He has no wheezes. He has no rales. He exhibits no tenderness.   Abdominal: Soft. Bowel sounds are normal. He exhibits no distension and no mass. There is no tenderness. No hernia.   Genitourinary: Rectum normal and penis normal.   Genitourinary Comments: Left scrotal wound is  "healled   Musculoskeletal: Normal range of motion. He exhibits no edema or tenderness.   Lymphadenopathy:     He has no cervical adenopathy.   Neurological: He is alert and oriented to person, place, and time. No cranial nerve deficit. He exhibits normal muscle tone. Coordination normal.   Skin: Skin is warm. No rash noted.   Psychiatric: He has a normal mood and affect. His behavior is normal. Judgment and thought content normal.   Nursing note and vitals reviewed.      Ht 177.8 cm (70\")   Wt 81.6 kg (180 lb)   BMI 25.83 kg/m²    Procedure:         Assessment:     Encounter Diagnosis   Name Primary?   • Lethargy Yes     Orders Placed This Encounter   Procedures   • Testosterone   • TSH     Standing Status:   Future     Standing Expiration Date:   8/14/2019   • CBC (No Diff)     Standing Status:   Future     Standing Expiration Date:   8/14/2019       Plan:   Will check testosterone, CBC and TSH and will see him in a month    Patient's Body mass index is 25.83 kg/m². BMI is within normal parameters. No follow-up required.      Counseling was given to patient for the following topics impressions as follows: possbile hormonal explanation for lethargy. The interim medical history and current results were reviewed.  A treatment plan with follow-up was made for Lethargy [R53.83].  This document has been electronically signed by Edvin Mars MD August 14, 2018 3:32 PM  "

## 2018-09-01 ENCOUNTER — LAB (OUTPATIENT)
Dept: LAB | Facility: HOSPITAL | Age: 63
End: 2018-09-01

## 2018-09-01 ENCOUNTER — TRANSCRIBE ORDERS (OUTPATIENT)
Dept: ADMINISTRATIVE | Facility: HOSPITAL | Age: 63
End: 2018-09-01

## 2018-09-01 DIAGNOSIS — M79.10 MYALGIA: ICD-10-CM

## 2018-09-01 DIAGNOSIS — Z13.9 SCREENING FOR CONDITION: ICD-10-CM

## 2018-09-01 DIAGNOSIS — Z13.220 SCREENING FOR LIPOID DISORDERS: Primary | ICD-10-CM

## 2018-09-01 DIAGNOSIS — Z13.220 LIPID SCREENING: Primary | ICD-10-CM

## 2018-09-01 DIAGNOSIS — R53.83 TIREDNESS: ICD-10-CM

## 2018-09-01 LAB
25(OH)D3 SERPL-MCNC: 17 NG/ML
ALBUMIN SERPL-MCNC: 4.6 G/DL (ref 3.4–4.8)
ALBUMIN/GLOB SERPL: 1.7 G/DL (ref 1.5–2.5)
ALP SERPL-CCNC: 124 U/L (ref 40–129)
ALT SERPL W P-5'-P-CCNC: 52 U/L (ref 10–44)
ANION GAP SERPL CALCULATED.3IONS-SCNC: 4.1 MMOL/L (ref 3.6–11.2)
AST SERPL-CCNC: 30 U/L (ref 10–34)
BASOPHILS # BLD AUTO: 0.03 10*3/MM3 (ref 0–0.3)
BASOPHILS NFR BLD AUTO: 0.7 % (ref 0–2)
BILIRUB SERPL-MCNC: 1.1 MG/DL (ref 0.2–1.8)
BUN BLD-MCNC: 17 MG/DL (ref 7–21)
BUN/CREAT SERPL: 14.8 (ref 7–25)
CALCIUM SPEC-SCNC: 9.4 MG/DL (ref 7.7–10)
CHLORIDE SERPL-SCNC: 105 MMOL/L (ref 99–112)
CHOLEST SERPL-MCNC: 137 MG/DL (ref 0–200)
CK SERPL-CCNC: 43 U/L (ref 24–204)
CO2 SERPL-SCNC: 27.9 MMOL/L (ref 24.3–31.9)
CREAT BLD-MCNC: 1.15 MG/DL (ref 0.43–1.29)
CRP SERPL-MCNC: 0.71 MG/DL (ref 0–0.99)
DEPRECATED RDW RBC AUTO: 42.7 FL (ref 37–54)
EOSINOPHIL # BLD AUTO: 0.14 10*3/MM3 (ref 0–0.7)
EOSINOPHIL NFR BLD AUTO: 3.4 % (ref 0–5)
ERYTHROCYTE [DISTWIDTH] IN BLOOD BY AUTOMATED COUNT: 13.6 % (ref 11.5–14.5)
ERYTHROCYTE [SEDIMENTATION RATE] IN BLOOD: 8 MM/HR (ref 0–20)
GFR SERPL CREATININE-BSD FRML MDRD: 64 ML/MIN/1.73
GLOBULIN UR ELPH-MCNC: 2.7 GM/DL
GLUCOSE BLD-MCNC: 239 MG/DL (ref 70–110)
HCT VFR BLD AUTO: 38 % (ref 42–52)
HDLC SERPL-MCNC: 32 MG/DL (ref 60–100)
HGB BLD-MCNC: 13.3 G/DL (ref 14–18)
IMM GRANULOCYTES # BLD: 0.02 10*3/MM3 (ref 0–0.03)
IMM GRANULOCYTES NFR BLD: 0.5 % (ref 0–0.5)
LDLC SERPL CALC-MCNC: 59 MG/DL (ref 0–100)
LDLC/HDLC SERPL: 1.85 {RATIO}
LYMPHOCYTES # BLD AUTO: 1.77 10*3/MM3 (ref 1–3)
LYMPHOCYTES NFR BLD AUTO: 43.3 % (ref 21–51)
MCH RBC QN AUTO: 30.4 PG (ref 27–33)
MCHC RBC AUTO-ENTMCNC: 35 G/DL (ref 33–37)
MCV RBC AUTO: 86.8 FL (ref 80–94)
MONOCYTES # BLD AUTO: 0.44 10*3/MM3 (ref 0.1–0.9)
MONOCYTES NFR BLD AUTO: 10.8 % (ref 0–10)
NEUTROPHILS # BLD AUTO: 1.69 10*3/MM3 (ref 1.4–6.5)
NEUTROPHILS NFR BLD AUTO: 41.3 % (ref 30–70)
OSMOLALITY SERPL CALC.SUM OF ELEC: 283.2 MOSM/KG (ref 273–305)
PLATELET # BLD AUTO: 138 10*3/MM3 (ref 130–400)
PMV BLD AUTO: 10.4 FL (ref 6–10)
POTASSIUM BLD-SCNC: 4.5 MMOL/L (ref 3.5–5.3)
PROT SERPL-MCNC: 7.3 G/DL (ref 6–8)
RBC # BLD AUTO: 4.38 10*6/MM3 (ref 4.7–6.1)
SODIUM BLD-SCNC: 137 MMOL/L (ref 135–153)
T4 FREE SERPL-MCNC: 1.22 NG/DL (ref 0.89–1.76)
TESTOST SERPL-MCNC: 207.51 NG/DL (ref 86.98–780.1)
TRIGL SERPL-MCNC: 229 MG/DL (ref 0–150)
TSH SERPL DL<=0.05 MIU/L-ACNC: 1.33 MIU/ML (ref 0.55–4.78)
URATE SERPL-MCNC: 4.7 MG/DL (ref 3.7–7)
VIT B12 BLD-MCNC: 527 PG/ML (ref 211–911)
VLDLC SERPL-MCNC: 45.8 MG/DL
WBC NRBC COR # BLD: 4.09 10*3/MM3 (ref 4.5–12.5)

## 2018-09-01 PROCEDURE — 82784 ASSAY IGA/IGD/IGG/IGM EACH: CPT

## 2018-09-01 PROCEDURE — 86140 C-REACTIVE PROTEIN: CPT | Performed by: NURSE PRACTITIONER

## 2018-09-01 PROCEDURE — 84443 ASSAY THYROID STIM HORMONE: CPT | Performed by: NURSE PRACTITIONER

## 2018-09-01 PROCEDURE — 84439 ASSAY OF FREE THYROXINE: CPT | Performed by: NURSE PRACTITIONER

## 2018-09-01 PROCEDURE — 86225 DNA ANTIBODY NATIVE: CPT | Performed by: NURSE PRACTITIONER

## 2018-09-01 PROCEDURE — 85025 COMPLETE CBC W/AUTO DIFF WBC: CPT | Performed by: NURSE PRACTITIONER

## 2018-09-01 PROCEDURE — 82550 ASSAY OF CK (CPK): CPT | Performed by: NURSE PRACTITIONER

## 2018-09-01 PROCEDURE — 82306 VITAMIN D 25 HYDROXY: CPT

## 2018-09-01 PROCEDURE — 86038 ANTINUCLEAR ANTIBODIES: CPT

## 2018-09-01 PROCEDURE — 84403 ASSAY OF TOTAL TESTOSTERONE: CPT | Performed by: NURSE PRACTITIONER

## 2018-09-01 PROCEDURE — 86200 CCP ANTIBODY: CPT | Performed by: NURSE PRACTITIONER

## 2018-09-01 PROCEDURE — 84550 ASSAY OF BLOOD/URIC ACID: CPT | Performed by: NURSE PRACTITIONER

## 2018-09-01 PROCEDURE — 86038 ANTINUCLEAR ANTIBODIES: CPT | Performed by: NURSE PRACTITIONER

## 2018-09-01 PROCEDURE — 82607 VITAMIN B-12: CPT | Performed by: NURSE PRACTITIONER

## 2018-09-01 PROCEDURE — 36415 COLL VENOUS BLD VENIPUNCTURE: CPT | Performed by: NURSE PRACTITIONER

## 2018-09-01 PROCEDURE — 80053 COMPREHEN METABOLIC PANEL: CPT | Performed by: NURSE PRACTITIONER

## 2018-09-01 PROCEDURE — 80061 LIPID PANEL: CPT | Performed by: NURSE PRACTITIONER

## 2018-09-01 PROCEDURE — 85652 RBC SED RATE AUTOMATED: CPT | Performed by: NURSE PRACTITIONER

## 2018-09-04 LAB
ANA HOMOGEN TITR SER: ABNORMAL {TITER}
ANA SER QL IA: POSITIVE
ANA SER QL: NEGATIVE
ANA SPECKLED TITR SER: ABNORMAL {TITER}
CCP IGA+IGG SERPL IA-ACNC: 5 UNITS (ref 0–19)
DSDNA AB SER-ACNC: <1 IU/ML (ref 0–9)
IGA SERPL-MCNC: 188 MG/DL (ref 61–437)
IGG SERPL-MCNC: 956 MG/DL (ref 700–1600)
Lab: ABNORMAL

## 2018-09-18 ENCOUNTER — OFFICE VISIT (OUTPATIENT)
Dept: UROLOGY | Facility: CLINIC | Age: 63
End: 2018-09-18

## 2018-09-18 VITALS — HEIGHT: 70 IN | BODY MASS INDEX: 25.77 KG/M2 | WEIGHT: 180 LBS

## 2018-09-18 DIAGNOSIS — N42.9 DISORDER OF PROSTATE: ICD-10-CM

## 2018-09-18 DIAGNOSIS — R53.83 LETHARGY: Primary | ICD-10-CM

## 2018-09-18 PROCEDURE — 99213 OFFICE O/P EST LOW 20 MIN: CPT | Performed by: UROLOGY

## 2018-09-18 NOTE — PROGRESS NOTES
Chief Complaint:          lethargy    HPI:   63 y.o. male.  Pt has been diagnosed as a diabetic.  HPI      Past Medical History:        Past Medical History:   Diagnosis Date   • Anxiety    • Arthritis    • Epididymitis    • GERD (gastroesophageal reflux disease)    • Heartburn    • Hydrocele    • Kidney stone          Current Meds:     Current Outpatient Prescriptions   Medication Sig Dispense Refill   • cephalexin (KEFLEX) 250 MG capsule Take 1 capsule by mouth 3 (Three) Times a Day. 21 capsule 0   • esomeprazole (nexIUM) 20 MG capsule Take 20 mg by mouth Every Other Day.     • oxyCODONE-acetaminophen (PERCOCET) 5-325 MG per tablet 1 to 2 Tablets Every 6 Hours as needed for PAIN 40 tablet 0   • tamsulosin (FLOMAX) 0.4 MG capsule 24 hr capsule Take 1 capsule by mouth Every Night. 30 capsule 11     No current facility-administered medications for this visit.         Allergies:      Allergies   Allergen Reactions   • Bactrim [Sulfamethoxazole-Trimethoprim] Rash and Other (See Comments)     Flu like symptoms        Past Surgical History:     Past Surgical History:   Procedure Laterality Date   • COLONOSCOPY     • CYSTOSCOPY W/ URETEROSCOPY W/ LITHOTRIPSY     • FINGER SURGERY     • HERNIA REPAIR     • SCROTAL EXPLORATION Left 6/28/2018    Procedure: SCROTAL EXPLORATION/ INGUINAL EXPLORATION HEMIORCHICETOMY WITH FROZEN SECTION.;  Surgeon: Edvin Mars MD;  Location: [facility];  Service: Urology         Social History:     Social History     Social History   • Marital status:      Spouse name: N/A   • Number of children: N/A   • Years of education: N/A     Occupational History   • Not on file.     Social History Main Topics   • Smoking status: Never Smoker   • Smokeless tobacco: Never Used   • Alcohol use No   • Drug use: No   • Sexual activity: Yes     Partners: Female     Other Topics Concern   • Not on file     Social History Narrative   • No narrative on file       Family History:     Family History    Problem Relation Age of Onset   • No Known Problems Father    • No Known Problems Mother        Review of Systems:     Review of Systems   Constitutional: Negative for chills, fatigue and fever.   HENT: Negative for congestion and sinus pressure.    Respiratory: Negative for shortness of breath.    Cardiovascular: Negative for chest pain.   Gastrointestinal: Negative for abdominal pain, constipation, diarrhea, nausea and vomiting.   Genitourinary: Negative for frequency and urgency.   Musculoskeletal: Negative for back pain and neck pain.   Neurological: Negative for dizziness and headaches.   Hematological: Does not bruise/bleed easily.   Psychiatric/Behavioral: The patient is not nervous/anxious.        I have reviewed the follow portions of the patient's history and confirmed they are accurate today:  allergies, current medications, past family history, past medical history, past social history, past surgical history, problem list and ROS  Physical Exam:     Physical Exam   Constitutional: He is oriented to person, place, and time.   HENT:   Head: Normocephalic and atraumatic.   Right Ear: External ear normal.   Left Ear: External ear normal.   Nose: Nose normal.   Mouth/Throat: Oropharynx is clear and moist.   Eyes: Pupils are equal, round, and reactive to light. Conjunctivae and EOM are normal.   Neck: Normal range of motion. Neck supple. No thyromegaly present.   Cardiovascular: Normal rate, regular rhythm, normal heart sounds and intact distal pulses.    No murmur heard.  Pulmonary/Chest: Effort normal and breath sounds normal. No respiratory distress. He has no wheezes. He has no rales. He exhibits no tenderness.   Abdominal: Soft. Bowel sounds are normal. He exhibits no distension and no mass. There is no tenderness. No hernia.   Genitourinary: Rectum normal, prostate normal and penis normal.   Musculoskeletal: Normal range of motion. He exhibits no edema or tenderness.   Lymphadenopathy:     He has no  "cervical adenopathy.   Neurological: He is alert and oriented to person, place, and time. No cranial nerve deficit. He exhibits normal muscle tone. Coordination normal.   Skin: Skin is warm. No rash noted.   Psychiatric: He has a normal mood and affect. His behavior is normal. Judgment and thought content normal.   Nursing note and vitals reviewed.      Ht 177.8 cm (70\")   Wt 81.6 kg (180 lb)   BMI 25.83 kg/m²    Procedure:         Assessment:     Encounter Diagnoses   Name Primary?   • Lethargy Yes   • Disorder of prostate      Orders Placed This Encounter   Procedures   • PSA DIAGNOSTIC     Standing Status:   Future     Standing Expiration Date:   9/18/2021       Plan:   Will see pt in a year.  With a psa    Patient's Body mass index is 25.83 kg/m². BMI is within normal parameters. No follow-up required.      Counseling was given to patient for the following topics diagnostic results including: testosterone and tsh. The interim medical history and current results were reviewed.  A treatment plan with follow-up was made for Lethargy [R53.83].  This document has been electronically signed by Edvin Mars MD September 18, 2018 3:02 PM  "

## 2019-09-24 ENCOUNTER — OFFICE VISIT (OUTPATIENT)
Dept: UROLOGY | Facility: CLINIC | Age: 64
End: 2019-09-24

## 2019-09-24 VITALS
HEIGHT: 70 IN | BODY MASS INDEX: 26.05 KG/M2 | WEIGHT: 182 LBS | DIASTOLIC BLOOD PRESSURE: 74 MMHG | SYSTOLIC BLOOD PRESSURE: 136 MMHG

## 2019-09-24 DIAGNOSIS — R35.0 BENIGN PROSTATIC HYPERPLASIA WITH URINARY FREQUENCY: Primary | ICD-10-CM

## 2019-09-24 DIAGNOSIS — N42.9 DISORDER OF PROSTATE: ICD-10-CM

## 2019-09-24 DIAGNOSIS — N40.1 BENIGN PROSTATIC HYPERPLASIA WITH URINARY FREQUENCY: Primary | ICD-10-CM

## 2019-09-24 PROCEDURE — 99213 OFFICE O/P EST LOW 20 MIN: CPT | Performed by: UROLOGY

## 2019-09-24 PROCEDURE — 84153 ASSAY OF PSA TOTAL: CPT | Performed by: UROLOGY

## 2019-09-24 RX ORDER — LISINOPRIL 5 MG/1
5 TABLET ORAL DAILY
COMMUNITY
Start: 2019-08-30 | End: 2022-09-11 | Stop reason: HOSPADM

## 2019-09-24 RX ORDER — ESOMEPRAZOLE MAGNESIUM 40 MG/1
40 CAPSULE, DELAYED RELEASE ORAL DAILY PRN
COMMUNITY
Start: 2019-08-17

## 2019-09-24 NOTE — PROGRESS NOTES
Chief Complaint:          BPH    HPI:   64 y.o. male.    HPI  Pt has a variable stream and he has nocturia x2 but his symptoms do not bother him his IPSS score is low at 5.  No family hx of prostate cancer.    Past Medical History:        Past Medical History:   Diagnosis Date   • Anxiety    • Arthritis    • Epididymitis    • GERD (gastroesophageal reflux disease)    • Heartburn    • Hydrocele    • Kidney stone          Current Meds:     Current Outpatient Medications   Medication Sig Dispense Refill   • esomeprazole (nexIUM) 40 MG capsule Daily.     • lisinopril (PRINIVIL,ZESTRIL) 5 MG tablet Daily.       No current facility-administered medications for this visit.         Allergies:      Allergies   Allergen Reactions   • Bactrim [Sulfamethoxazole-Trimethoprim] Rash and Other (See Comments)     Flu like symptoms        Past Surgical History:     Past Surgical History:   Procedure Laterality Date   • COLONOSCOPY     • CYSTOSCOPY W/ URETEROSCOPY W/ LITHOTRIPSY     • FINGER SURGERY     • HERNIA REPAIR     • SCROTAL EXPLORATION Left 6/28/2018    Procedure: SCROTAL EXPLORATION/ INGUINAL EXPLORATION HEMIORCHICETOMY WITH FROZEN SECTION.;  Surgeon: Edvin Mars MD;  Location: [facility] OR;  Service: Urology         Social History:     Social History     Socioeconomic History   • Marital status:      Spouse name: Not on file   • Number of children: Not on file   • Years of education: Not on file   • Highest education level: Not on file   Tobacco Use   • Smoking status: Never Smoker   • Smokeless tobacco: Never Used   Substance and Sexual Activity   • Alcohol use: No   • Drug use: No   • Sexual activity: Yes     Partners: Female       Family History:     Family History   Problem Relation Age of Onset   • No Known Problems Father    • No Known Problems Mother        Review of Systems:     Review of Systems   Constitutional: Negative for chills and fever.   HENT: Negative for sinus pressure and sinus pain.     Respiratory: Negative for cough.    Cardiovascular: Negative for leg swelling.   Gastrointestinal: Negative for abdominal pain.   Genitourinary: Negative for dysuria, frequency and urgency.   Musculoskeletal: Negative for back pain.   Neurological: Negative for headaches.   Psychiatric/Behavioral: The patient is not nervous/anxious.        IPSS Questionnaire (AUA-7):  Over the past month…    1)  Incomplete Emptying  How often have you had a sensation of not emptying your bladder?  0 - Not at all   2)  Frequency  How often have you had to urinate less than every two hours? 0 - Not at all   3)  Intermittency  How often have you found you stopped and started again several times when you urinated?  0 - Not at all   4) Urgency  How often have you found it difficult to postpone urination?  0 - Not at all   5) Weak Stream  How often have you had a weak urinary stream?  3 - About half the time   6) Straining  How often have you had to push or strain to begin urination?  0 - Not at all   7) Nocturia  How many times did you typically get up at night to urinate?  2 - 2 times   Total Score:  5       Quality of life due to urinary symptoms:  If you were to spend the rest of your life with your urinary condition the way it is now, how would you feel about that? 2-Mostly Satisfied   Urine Leakage (Incontinence) 0-No Leakage       I have reviewed the follow portions of the patient's history and confirmed they are accurate today:  allergies, current medications, past family history, past medical history, past social history, past surgical history, problem list and ROS  Physical Exam:     Physical Exam   Constitutional: He is oriented to person, place, and time.   HENT:   Head: Normocephalic and atraumatic.   Right Ear: External ear normal.   Left Ear: External ear normal.   Nose: Nose normal.   Mouth/Throat: Oropharynx is clear and moist.   Eyes: Conjunctivae and EOM are normal. Pupils are equal, round, and reactive to light.  "  Neck: Normal range of motion. Neck supple. No thyromegaly present.   Cardiovascular: Normal rate, regular rhythm, normal heart sounds and intact distal pulses.   No murmur heard.  Pulmonary/Chest: Effort normal and breath sounds normal. No respiratory distress. He has no wheezes. He has no rales. He exhibits no tenderness.   Abdominal: Soft. Bowel sounds are normal. He exhibits no distension and no mass. There is no tenderness. No hernia.   Genitourinary: Rectum normal, prostate normal and penis normal.   Musculoskeletal: Normal range of motion. He exhibits no edema or tenderness.   Lymphadenopathy:     He has no cervical adenopathy.   Neurological: He is alert and oriented to person, place, and time. No cranial nerve deficit. He exhibits normal muscle tone. Coordination normal.   Skin: Skin is warm. No rash noted.   Psychiatric: He has a normal mood and affect. His behavior is normal. Judgment and thought content normal.   Nursing note and vitals reviewed.      /74 (BP Location: Left arm, Patient Position: Sitting, Cuff Size: Adult)   Ht 177.8 cm (70\")   Wt 82.6 kg (182 lb)   BMI 26.11 kg/m²    Procedure:         Assessment:     Encounter Diagnosis   Name Primary?   • Benign prostatic hyperplasia with urinary frequency Yes       No orders of the defined types were placed in this encounter.      Patient reports that he is not currently experiencing any symptoms of urinary incontinence.      Plan:   Will check a psa today and will see him next year.    Patient's Body mass index is 26.11 kg/m². BMI is within normal parameters. No follow-up required..      Smoking Cessation Counseling:  Never a smoker.  Patient does not currently use any tobacco products.     Counseling was given to patient for the following topics impressions as follows: BPH. The interim medical history and current results were reviewed.  A treatment plan with follow-up was made for Benign prostatic hyperplasia with urinary frequency " [N40.1, R35.0].   This document has been electronically signed by Edvin Mars MD September 24, 2019 3:14 PM

## 2019-09-25 LAB — PSA SERPL-MCNC: 1.69 NG/ML (ref 0–4)

## 2020-10-14 ENCOUNTER — OFFICE VISIT (OUTPATIENT)
Dept: UROLOGY | Facility: CLINIC | Age: 65
End: 2020-10-14

## 2020-10-14 VITALS — HEIGHT: 70 IN | BODY MASS INDEX: 25.91 KG/M2 | TEMPERATURE: 99 F | WEIGHT: 181 LBS

## 2020-10-14 DIAGNOSIS — N40.1 BENIGN PROSTATIC HYPERPLASIA WITH URINARY FREQUENCY: Primary | ICD-10-CM

## 2020-10-14 DIAGNOSIS — R35.0 FREQUENCY OF URINATION: ICD-10-CM

## 2020-10-14 DIAGNOSIS — R35.0 BENIGN PROSTATIC HYPERPLASIA WITH URINARY FREQUENCY: Primary | ICD-10-CM

## 2020-10-14 LAB
BILIRUB BLD-MCNC: NEGATIVE MG/DL
CLARITY, POC: CLEAR
COLOR UR: YELLOW
GLUCOSE UR STRIP-MCNC: NEGATIVE MG/DL
KETONES UR QL: NEGATIVE
LEUKOCYTE EST, POC: NEGATIVE
NITRITE UR-MCNC: NEGATIVE MG/ML
PH UR: 6 [PH] (ref 5–8)
PROT UR STRIP-MCNC: NEGATIVE MG/DL
RBC # UR STRIP: NEGATIVE /UL
SP GR UR: 1.02 (ref 1–1.03)
UROBILINOGEN UR QL: NORMAL

## 2020-10-14 PROCEDURE — 81003 URINALYSIS AUTO W/O SCOPE: CPT | Performed by: UROLOGY

## 2020-10-14 PROCEDURE — 99213 OFFICE O/P EST LOW 20 MIN: CPT | Performed by: UROLOGY

## 2020-10-14 PROCEDURE — 84153 ASSAY OF PSA TOTAL: CPT | Performed by: UROLOGY

## 2020-10-14 RX ORDER — METFORMIN HYDROCHLORIDE 500 MG/1
500 TABLET, EXTENDED RELEASE ORAL
Status: ON HOLD | COMMUNITY
End: 2022-09-07

## 2020-10-14 NOTE — PROGRESS NOTES
Chief Complaint:          BPH    HPI:   65 y.o. male.  Pt has nocturia 2 to 3 times.  HPI  No problems with kidney stones.    Past Medical History:        Past Medical History:   Diagnosis Date   • Anxiety    • Arthritis    • Diabetes mellitus (CMS/HCC)    • Epididymitis    • GERD (gastroesophageal reflux disease)    • Heartburn    • Hydrocele    • Kidney stone          Current Meds:     Current Outpatient Medications   Medication Sig Dispense Refill   • esomeprazole (nexIUM) 40 MG capsule Daily.     • lisinopril (PRINIVIL,ZESTRIL) 5 MG tablet Daily.     • metFORMIN ER (GLUCOPHAGE-XR) 500 MG 24 hr tablet Take 500 mg by mouth Daily With Breakfast.       No current facility-administered medications for this visit.         Allergies:      Allergies   Allergen Reactions   • Bactrim [Sulfamethoxazole-Trimethoprim] Rash and Other (See Comments)     Flu like symptoms        Past Surgical History:     Past Surgical History:   Procedure Laterality Date   • COLONOSCOPY     • CYSTOSCOPY W/ URETEROSCOPY W/ LITHOTRIPSY     • FINGER SURGERY     • HERNIA REPAIR     • SCROTAL EXPLORATION Left 6/28/2018    Procedure: SCROTAL EXPLORATION/ INGUINAL EXPLORATION HEMIORCHICETOMY WITH FROZEN SECTION.;  Surgeon: Edvin Mars MD;  Location: [facility];  Service: Urology         Social History:     Social History     Socioeconomic History   • Marital status:      Spouse name: Not on file   • Number of children: Not on file   • Years of education: Not on file   • Highest education level: Not on file   Tobacco Use   • Smoking status: Never Smoker   • Smokeless tobacco: Never Used   Substance and Sexual Activity   • Alcohol use: No   • Drug use: No   • Sexual activity: Yes     Partners: Female       Family History:     Family History   Problem Relation Age of Onset   • No Known Problems Father    • No Known Problems Mother        Review of Systems:     Review of Systems   Constitutional: Positive for fatigue. Negative for chills  and fever.   HENT: Negative for congestion and sinus pressure.    Eyes: Negative for pain.   Respiratory: Negative for chest tightness and shortness of breath.    Cardiovascular: Negative for chest pain.   Gastrointestinal: Negative for abdominal pain, constipation, diarrhea, nausea and vomiting.   Endocrine: Negative for heat intolerance.   Genitourinary: Positive for frequency. Negative for dysuria, flank pain, hematuria and urgency.   Musculoskeletal: Negative for back pain and neck pain.   Skin: Negative for rash.   Allergic/Immunologic: Negative for food allergies.   Neurological: Negative for dizziness and headaches.   Hematological: Does not bruise/bleed easily.   Psychiatric/Behavioral: The patient is not nervous/anxious.        IPSS Questionnaire (AUA-7):  Over the past month…    1)  Incomplete Emptying  How often have you had a sensation of not emptying your bladder?  0 - Not at all   2)  Frequency  How often have you had to urinate less than every two hours? 1 - Less than 1 time in 5   3)  Intermittency  How often have you found you stopped and started again several times when you urinated?  0 - Not at all   4) Urgency  How often have you found it difficult to postpone urination?  1 - Less than 1 time in 5   5) Weak Stream  How often have you had a weak urinary stream?  3 - About half the time   6) Straining  How often have you had to push or strain to begin urination?  2 - Less than half the time   7) Nocturia  How many times did you typically get up at night to urinate?  3   Total Score:  10       Quality of life due to urinary symptoms:  If you were to spend the rest of your life with your urinary condition the way it is now, how would you feel about that? 2-Mostly Satisfied   Urine Leakage (Incontinence) 0-No Leakage       I have reviewed the follow portions of the patient's history and confirmed they are accurate today:  allergies, current medications, past family history, past medical history, past  "social history, past surgical history, problem list and ROS  Physical Exam:     Physical Exam  Vitals signs and nursing note reviewed.   HENT:      Head: Normocephalic and atraumatic.      Right Ear: External ear normal.      Left Ear: External ear normal.      Nose: Nose normal.   Eyes:      Conjunctiva/sclera: Conjunctivae normal.      Pupils: Pupils are equal, round, and reactive to light.   Neck:      Musculoskeletal: Normal range of motion and neck supple.      Thyroid: No thyromegaly.   Cardiovascular:      Rate and Rhythm: Normal rate and regular rhythm.      Heart sounds: Normal heart sounds. No murmur.   Pulmonary:      Effort: Pulmonary effort is normal. No respiratory distress.      Breath sounds: Normal breath sounds. No wheezing or rales.   Chest:      Chest wall: No tenderness.   Abdominal:      General: Bowel sounds are normal. There is no distension.      Palpations: Abdomen is soft. There is no mass.      Tenderness: There is no abdominal tenderness.      Hernia: No hernia is present.   Genitourinary:     Penis: Normal.       Prostate: Normal.      Rectum: Normal.   Musculoskeletal: Normal range of motion.         General: No tenderness.   Lymphadenopathy:      Cervical: No cervical adenopathy.   Skin:     General: Skin is warm.      Findings: No rash.   Neurological:      Mental Status: He is alert and oriented to person, place, and time.      Cranial Nerves: No cranial nerve deficit.      Motor: No abnormal muscle tone.      Coordination: Coordination normal.   Psychiatric:         Behavior: Behavior normal.         Thought Content: Thought content normal.         Judgment: Judgment normal.         Temp 99 °F (37.2 °C)   Ht 177.8 cm (70\")   Wt 82.1 kg (181 lb)   BMI 25.97 kg/m²    Procedure:         Assessment:     Encounter Diagnoses   Name Primary?   • Frequency of urination Yes   • Benign prostatic hyperplasia with urinary frequency        Orders Placed This Encounter   Procedures   • POC " Urinalysis Dipstick, Automated       Patient reports that he is not currently experiencing any symptoms of urinary incontinence.      Plan:   Will check a psa today.    Patient's Body mass index is 25.97 kg/m². BMI is within normal parameters. No follow-up required..      Smoking Cessation Counseling:  Never a smoker.  Patient does not currently use any tobacco products.     Counseling was given to patient for the following topics impressions as follows: BPH. The interim medical history and current results were reviewed.  A treatment plan with follow-up was made for Frequency of urination [R35.0]. I spent 21 minutes face to face with Evan Salazar and 80 percentage was spent in counseling.       This document has been electronically signed by Edvin Mars MD October 14, 2020 14:00 EDT

## 2020-10-15 LAB — PSA SERPL-MCNC: 1.28 NG/ML (ref 0–4)

## 2021-09-02 ENCOUNTER — HOSPITAL ENCOUNTER (EMERGENCY)
Facility: HOSPITAL | Age: 66
Discharge: HOME | End: 2021-09-02
Attending: STUDENT IN AN ORGANIZED HEALTH CARE EDUCATION/TRAINING PROGRAM | Admitting: STUDENT IN AN ORGANIZED HEALTH CARE EDUCATION/TRAINING PROGRAM

## 2021-09-02 ENCOUNTER — APPOINTMENT (OUTPATIENT)
Dept: GENERAL RADIOLOGY | Facility: HOSPITAL | Age: 66
End: 2021-09-02

## 2021-09-02 VITALS
HEIGHT: 69 IN | SYSTOLIC BLOOD PRESSURE: 162 MMHG | TEMPERATURE: 101.2 F | WEIGHT: 168 LBS | HEART RATE: 107 BPM | DIASTOLIC BLOOD PRESSURE: 94 MMHG | RESPIRATION RATE: 18 BRPM | OXYGEN SATURATION: 99 % | BODY MASS INDEX: 24.88 KG/M2

## 2021-09-02 DIAGNOSIS — U07.1 COVID-19: Primary | ICD-10-CM

## 2021-09-02 LAB
ALBUMIN SERPL-MCNC: 4.47 G/DL (ref 3.5–5.2)
ALBUMIN/GLOB SERPL: 1.6 G/DL
ALP SERPL-CCNC: 113 U/L (ref 39–117)
ALT SERPL W P-5'-P-CCNC: 33 U/L (ref 1–41)
ANION GAP SERPL CALCULATED.3IONS-SCNC: 12.7 MMOL/L (ref 5–15)
APTT PPP: 26 SECONDS (ref 25.5–35.4)
AST SERPL-CCNC: 23 U/L (ref 1–40)
BASOPHILS # BLD AUTO: 0.02 10*3/MM3 (ref 0–0.2)
BASOPHILS NFR BLD AUTO: 0.5 % (ref 0–1.5)
BILIRUB SERPL-MCNC: 0.8 MG/DL (ref 0–1.2)
BUN SERPL-MCNC: 19 MG/DL (ref 8–23)
BUN/CREAT SERPL: 17.9 (ref 7–25)
CALCIUM SPEC-SCNC: 9.2 MG/DL (ref 8.6–10.5)
CHLORIDE SERPL-SCNC: 100 MMOL/L (ref 98–107)
CO2 SERPL-SCNC: 24.3 MMOL/L (ref 22–29)
CREAT SERPL-MCNC: 1.06 MG/DL (ref 0.76–1.27)
D DIMER PPP FEU-MCNC: 0.36 MCGFEU/ML (ref 0–0.5)
DEPRECATED RDW RBC AUTO: 38.3 FL (ref 37–54)
EOSINOPHIL # BLD AUTO: 0.09 10*3/MM3 (ref 0–0.4)
EOSINOPHIL NFR BLD AUTO: 2.1 % (ref 0.3–6.2)
ERYTHROCYTE [DISTWIDTH] IN BLOOD BY AUTOMATED COUNT: 12.5 % (ref 12.3–15.4)
FLUAV SUBTYP SPEC NAA+PROBE: NOT DETECTED
FLUBV RNA ISLT QL NAA+PROBE: NOT DETECTED
GFR SERPL CREATININE-BSD FRML MDRD: 70 ML/MIN/1.73
GLOBULIN UR ELPH-MCNC: 2.8 GM/DL
GLUCOSE SERPL-MCNC: 270 MG/DL (ref 65–99)
HCT VFR BLD AUTO: 43.2 % (ref 37.5–51)
HGB BLD-MCNC: 14.8 G/DL (ref 13–17.7)
IMM GRANULOCYTES # BLD AUTO: 0.04 10*3/MM3 (ref 0–0.05)
IMM GRANULOCYTES NFR BLD AUTO: 0.9 % (ref 0–0.5)
INR PPP: 0.92 (ref 0.9–1.1)
LYMPHOCYTES # BLD AUTO: 0.85 10*3/MM3 (ref 0.7–3.1)
LYMPHOCYTES NFR BLD AUTO: 19.6 % (ref 19.6–45.3)
MCH RBC QN AUTO: 29.4 PG (ref 26.6–33)
MCHC RBC AUTO-ENTMCNC: 34.3 G/DL (ref 31.5–35.7)
MCV RBC AUTO: 85.9 FL (ref 79–97)
MONOCYTES # BLD AUTO: 0.8 10*3/MM3 (ref 0.1–0.9)
MONOCYTES NFR BLD AUTO: 18.4 % (ref 5–12)
NEUTROPHILS NFR BLD AUTO: 2.54 10*3/MM3 (ref 1.7–7)
NEUTROPHILS NFR BLD AUTO: 58.5 % (ref 42.7–76)
NRBC BLD AUTO-RTO: 0 /100 WBC (ref 0–0.2)
PLATELET # BLD AUTO: 130 10*3/MM3 (ref 140–450)
PMV BLD AUTO: 9.9 FL (ref 6–12)
POTASSIUM SERPL-SCNC: 4.2 MMOL/L (ref 3.5–5.2)
PROT SERPL-MCNC: 7.3 G/DL (ref 6–8.5)
PROTHROMBIN TIME: 12.8 SECONDS (ref 12.8–14.5)
RBC # BLD AUTO: 5.03 10*6/MM3 (ref 4.14–5.8)
SARS-COV-2 RNA PNL SPEC NAA+PROBE: DETECTED
SODIUM SERPL-SCNC: 137 MMOL/L (ref 136–145)
TROPONIN T SERPL-MCNC: <0.01 NG/ML (ref 0–0.03)
TROPONIN T SERPL-MCNC: <0.01 NG/ML (ref 0–0.03)
WBC # BLD AUTO: 4.34 10*3/MM3 (ref 3.4–10.8)

## 2021-09-02 PROCEDURE — M0243 CASIRIVI AND IMDEVI INFUSION: HCPCS | Performed by: NURSE PRACTITIONER

## 2021-09-02 PROCEDURE — 85379 FIBRIN DEGRADATION QUANT: CPT | Performed by: NURSE PRACTITIONER

## 2021-09-02 PROCEDURE — 25010000006 INJECTION, CASIRIVIMAB AND IMDEVIMAB, 1200 MG: Performed by: NURSE PRACTITIONER

## 2021-09-02 PROCEDURE — 85730 THROMBOPLASTIN TIME PARTIAL: CPT | Performed by: NURSE PRACTITIONER

## 2021-09-02 PROCEDURE — 99283 EMERGENCY DEPT VISIT LOW MDM: CPT

## 2021-09-02 PROCEDURE — 71045 X-RAY EXAM CHEST 1 VIEW: CPT | Performed by: RADIOLOGY

## 2021-09-02 PROCEDURE — 80053 COMPREHEN METABOLIC PANEL: CPT | Performed by: NURSE PRACTITIONER

## 2021-09-02 PROCEDURE — 71045 X-RAY EXAM CHEST 1 VIEW: CPT

## 2021-09-02 PROCEDURE — 84484 ASSAY OF TROPONIN QUANT: CPT | Performed by: NURSE PRACTITIONER

## 2021-09-02 PROCEDURE — 85025 COMPLETE CBC W/AUTO DIFF WBC: CPT | Performed by: NURSE PRACTITIONER

## 2021-09-02 PROCEDURE — 93005 ELECTROCARDIOGRAM TRACING: CPT | Performed by: NURSE PRACTITIONER

## 2021-09-02 PROCEDURE — 85610 PROTHROMBIN TIME: CPT | Performed by: NURSE PRACTITIONER

## 2021-09-02 PROCEDURE — 93010 ELECTROCARDIOGRAM REPORT: CPT | Performed by: INTERNAL MEDICINE

## 2021-09-02 PROCEDURE — 87636 SARSCOV2 & INF A&B AMP PRB: CPT | Performed by: NURSE PRACTITIONER

## 2021-09-02 RX ORDER — DEXAMETHASONE 6 MG/1
6 TABLET ORAL
Qty: 10 TABLET | Refills: 0 | Status: SHIPPED | OUTPATIENT
Start: 2021-09-02 | End: 2021-09-12

## 2021-09-02 RX ORDER — SODIUM CHLORIDE 9 MG/ML
30 INJECTION, SOLUTION INTRAVENOUS ONCE
Status: COMPLETED | OUTPATIENT
Start: 2021-09-02 | End: 2021-09-02

## 2021-09-02 RX ORDER — EPINEPHRINE 1 MG/ML
0.3 INJECTION, SOLUTION INTRAMUSCULAR; SUBCUTANEOUS ONCE AS NEEDED
Status: DISCONTINUED | OUTPATIENT
Start: 2021-09-02 | End: 2021-09-02 | Stop reason: HOSPADM

## 2021-09-02 RX ORDER — ALBUTEROL SULFATE 90 UG/1
2 AEROSOL, METERED RESPIRATORY (INHALATION) EVERY 6 HOURS PRN
Qty: 8 G | Refills: 0 | Status: ON HOLD | OUTPATIENT
Start: 2021-09-02 | End: 2022-09-07

## 2021-09-02 RX ORDER — BENZONATATE 200 MG/1
200 CAPSULE ORAL 3 TIMES DAILY PRN
Qty: 20 CAPSULE | Refills: 0 | Status: SHIPPED | OUTPATIENT
Start: 2021-09-02 | End: 2022-08-29

## 2021-09-02 RX ORDER — DIPHENHYDRAMINE HYDROCHLORIDE 50 MG/ML
50 INJECTION INTRAMUSCULAR; INTRAVENOUS ONCE AS NEEDED
Status: DISCONTINUED | OUTPATIENT
Start: 2021-09-02 | End: 2021-09-02 | Stop reason: HOSPADM

## 2021-09-02 RX ORDER — METHYLPREDNISOLONE SODIUM SUCCINATE 125 MG/2ML
125 INJECTION, POWDER, LYOPHILIZED, FOR SOLUTION INTRAMUSCULAR; INTRAVENOUS ONCE AS NEEDED
Status: DISCONTINUED | OUTPATIENT
Start: 2021-09-02 | End: 2021-09-02 | Stop reason: HOSPADM

## 2021-09-02 RX ADMIN — CASIRIVIMAB AND IMDEVIMAB: 600; 600 INJECTION, SOLUTION, CONCENTRATE INTRAVENOUS at 15:25

## 2021-09-02 RX ADMIN — SODIUM CHLORIDE 30 ML: 9 INJECTION, SOLUTION INTRAVENOUS at 15:48

## 2021-09-02 NOTE — ED PROVIDER NOTES
Subjective     URI  Presenting symptoms: congestion, cough, fever and sore throat    Severity:  Moderate  Onset quality:  Gradual  Duration:  3 days  Timing:  Constant  Progression:  Waxing and waning  Chronicity:  New  Relieved by:  Nothing  Worsened by:  Nothing  Ineffective treatments:  None tried  Associated symptoms: wheezing    Associated symptoms: no arthralgias, no headaches, no myalgias, no neck pain, no sinus pain and no swollen glands    Risk factors: not elderly, no chronic respiratory disease and no recent illness    Risk factors comment:  Patient had positive Covid test at home      Review of Systems   Constitutional: Positive for fever.   HENT: Positive for congestion and sore throat. Negative for sinus pain.    Eyes: Negative.    Respiratory: Positive for cough and wheezing.    Cardiovascular: Negative.    Gastrointestinal: Negative.    Endocrine: Negative.    Genitourinary: Negative.    Musculoskeletal: Negative.  Negative for arthralgias, myalgias and neck pain.   Skin: Negative.    Allergic/Immunologic: Negative.    Neurological: Negative.  Negative for headaches.   Hematological: Negative.    Psychiatric/Behavioral: Negative.        Past Medical History:   Diagnosis Date   • Anxiety    • Arthritis    • Diabetes mellitus (CMS/HCC)    • Epididymitis    • GERD (gastroesophageal reflux disease)    • Heartburn    • Hydrocele    • Kidney stone        Allergies   Allergen Reactions   • Bactrim [Sulfamethoxazole-Trimethoprim] Rash and Other (See Comments)     Flu like symptoms       Past Surgical History:   Procedure Laterality Date   • COLONOSCOPY     • CYSTOSCOPY W/ URETEROSCOPY W/ LITHOTRIPSY     • FINGER SURGERY     • HERNIA REPAIR     • SCROTAL EXPLORATION Left 6/28/2018    Procedure: SCROTAL EXPLORATION/ INGUINAL EXPLORATION HEMIORCHICETOMY WITH FROZEN SECTION.;  Surgeon: Edvin Mars MD;  Location: [facility];  Service: Urology       Family History   Problem Relation Age of Onset   • No  Known Problems Father    • No Known Problems Mother        Social History     Socioeconomic History   • Marital status:      Spouse name: Not on file   • Number of children: Not on file   • Years of education: Not on file   • Highest education level: Not on file   Tobacco Use   • Smoking status: Never Smoker   • Smokeless tobacco: Never Used   Substance and Sexual Activity   • Alcohol use: No   • Drug use: No   • Sexual activity: Yes     Partners: Female           Objective   Physical Exam  Vitals and nursing note reviewed.   Constitutional:       Appearance: He is well-developed.   HENT:      Head: Normocephalic.      Right Ear: External ear normal.      Left Ear: External ear normal.   Eyes:      Conjunctiva/sclera: Conjunctivae normal.      Pupils: Pupils are equal, round, and reactive to light.   Cardiovascular:      Rate and Rhythm: Normal rate and regular rhythm.      Heart sounds: Normal heart sounds.   Pulmonary:      Effort: Pulmonary effort is normal.      Breath sounds: Normal breath sounds.   Abdominal:      General: Bowel sounds are normal.      Palpations: Abdomen is soft.   Musculoskeletal:         General: Normal range of motion.      Cervical back: Normal range of motion and neck supple.   Skin:     General: Skin is warm and dry.      Capillary Refill: Capillary refill takes less than 2 seconds.   Neurological:      Mental Status: He is alert and oriented to person, place, and time.   Psychiatric:         Behavior: Behavior normal.         Thought Content: Thought content normal.         Procedures           ED Course  ED Course as of Sep 19 2025   Thu Sep 02, 2021   1119 EKG interpretation, ventricular rate 68, , QRS 78, QTc 387, normal sinus rhythm    []   1413 The FDA has authorized the emergency use of REGN-COV2  which is not an FDA approved drug. Discussions with the patient regarding the risks and benefits of REGN-COV2 have occurred. The patient recognizes that this is an  "investigational treatment which may offer significant known and potential benefits and risks, the extent of which are unknown. Information on available alternative treatments and the risks and benefits of those alternatives was discussed. The patient received the \"Fact Sheet for Patients Parents and Caregivers\". All questions from the patient were answered to satisfaction. The patient has the option to accept or refuse treatment with REGN-COV2 and would like to accept treatment.    Counseling regarding continued self isolation and use of infection control measures according to the CDC guidelines has occurred.      [GIA]      ED Course User Index  [GIA] Torito Tenorio, LUTHER  [JM] Cj Pretty,                                            MDM    Final diagnoses:   COVID-19       ED Disposition  ED Disposition     ED Disposition Condition Comment    Discharge Stable           Kreis, Samuel Duane, MD  [address] [place] [address] [address] [place] [address] [phone]    Schedule an appointment as soon as possible for a visit   For further evaluation         Medication List      New Prescriptions    albuterol sulfate  (90 Base) MCG/ACT inhaler  Commonly known as: PROVENTIL HFA;VENTOLIN HFA;PROAIR HFA  Inhale 2 puffs Every 6 (Six) Hours As Needed for Wheezing or Shortness of Air.     benzonatate 200 MG capsule  Commonly known as: TESSALON  Take 1 capsule by mouth 3 (Three) Times a Day As Needed for Cough.        ASK your doctor about these medications    dexamethasone 6 MG tablet  Commonly known as: DECADRON  Take 1 tablet by mouth Daily With Breakfast for 10 days.  Ask about: Should I take this medication?           Where to Get Your Medications      You can get these medications from any pharmacy    Bring a paper prescription for each of these medications  · albuterol sulfate  (90 Base) MCG/ACT inhaler  · benzonatate 200 MG capsule  · dexamethasone 6 MG tablet          Torito Tenorio APRN  09/19/21 " 2025

## 2021-09-02 NOTE — ED NOTES
MEDICAL SCREENING:    Reason for Visit: cough, trouble breathing, positive covid test at home    Patient initially seen in triage.  The patient was advised further evaluation and diagnostic testing will be needed, some of the treatment and testing will be initiated in the lobby in order to begin the process.  The patient will be returned to the waiting area for the time being and possibly be re-assessed by a subsequent ED provider.  The patient will be brought back to the treatment area in as timely manner as possible.       Torito Tenorio, APRN  09/02/21 1049

## 2021-09-02 NOTE — ED NOTES
Pt sitting in ED waiting area, awaiting available room. Pt denies changes in condition, denies needs at this time.  No available rooms at this time due to large patient influx r/t COVID 19 Pandemic.  Pt instructed to notify staff of any changes, pt verbalizes understanding.     Lyly Eaton, RN  09/02/21 1200

## 2021-09-03 LAB
QT INTERVAL: 364 MS
QTC INTERVAL: 387 MS

## 2021-09-17 ENCOUNTER — APPOINTMENT (OUTPATIENT)
Dept: GENERAL RADIOLOGY | Facility: HOSPITAL | Age: 66
End: 2021-09-17

## 2021-09-17 ENCOUNTER — HOSPITAL ENCOUNTER (EMERGENCY)
Facility: HOSPITAL | Age: 66
Discharge: HOME | End: 2021-09-17
Admitting: EMERGENCY MEDICINE

## 2021-09-17 VITALS
RESPIRATION RATE: 16 BRPM | SYSTOLIC BLOOD PRESSURE: 156 MMHG | OXYGEN SATURATION: 99 % | WEIGHT: 168 LBS | BODY MASS INDEX: 24.88 KG/M2 | HEIGHT: 69 IN | DIASTOLIC BLOOD PRESSURE: 94 MMHG | HEART RATE: 88 BPM | TEMPERATURE: 98 F

## 2021-09-17 DIAGNOSIS — R73.9 HYPERGLYCEMIA: Primary | ICD-10-CM

## 2021-09-17 LAB
A-A DO2: 20.8 MMHG (ref 0–300)
ACETONE BLD QL: NEGATIVE
ALBUMIN SERPL-MCNC: 4.41 G/DL (ref 3.5–5.2)
ALBUMIN/GLOB SERPL: 1.9 G/DL
ALP SERPL-CCNC: 119 U/L (ref 39–117)
ALT SERPL W P-5'-P-CCNC: 54 U/L (ref 1–41)
ANION GAP SERPL CALCULATED.3IONS-SCNC: 12.9 MMOL/L (ref 5–15)
ARTERIAL PATENCY WRIST A: POSITIVE
AST SERPL-CCNC: 21 U/L (ref 1–40)
ATMOSPHERIC PRESS: 730 MMHG
BASE EXCESS BLDA CALC-SCNC: 1.4 MMOL/L (ref 0–2)
BASOPHILS # BLD AUTO: 0.04 10*3/MM3 (ref 0–0.2)
BASOPHILS NFR BLD AUTO: 0.5 % (ref 0–1.5)
BDY SITE: ABNORMAL
BILIRUB SERPL-MCNC: 0.6 MG/DL (ref 0–1.2)
BODY TEMPERATURE: 0 C
BUN SERPL-MCNC: 21 MG/DL (ref 8–23)
BUN/CREAT SERPL: 20.8 (ref 7–25)
CALCIUM SPEC-SCNC: 9.1 MG/DL (ref 8.6–10.5)
CHLORIDE SERPL-SCNC: 96 MMOL/L (ref 98–107)
CO2 BLDA-SCNC: 27 MMOL/L (ref 22–33)
CO2 SERPL-SCNC: 22.1 MMOL/L (ref 22–29)
COHGB MFR BLD: 1.3 % (ref 0–5)
CREAT SERPL-MCNC: 1.01 MG/DL (ref 0.76–1.27)
CRP SERPL-MCNC: 0.9 MG/DL (ref 0–0.5)
DEPRECATED RDW RBC AUTO: 36.9 FL (ref 37–54)
EOSINOPHIL # BLD AUTO: 0.13 10*3/MM3 (ref 0–0.4)
EOSINOPHIL NFR BLD AUTO: 1.5 % (ref 0.3–6.2)
ERYTHROCYTE [DISTWIDTH] IN BLOOD BY AUTOMATED COUNT: 12 % (ref 12.3–15.4)
GFR SERPL CREATININE-BSD FRML MDRD: 74 ML/MIN/1.73
GLOBULIN UR ELPH-MCNC: 2.3 GM/DL
GLUCOSE BLDC GLUCOMTR-MCNC: 230 MG/DL (ref 70–130)
GLUCOSE BLDC GLUCOMTR-MCNC: 535 MG/DL (ref 70–130)
GLUCOSE SERPL-MCNC: 534 MG/DL (ref 65–99)
HBA1C MFR BLD: 11.2 % (ref 4.8–5.6)
HCO3 BLDA-SCNC: 25.8 MMOL/L (ref 20–26)
HCT VFR BLD AUTO: 43.9 % (ref 37.5–51)
HCT VFR BLD CALC: 39.6 % (ref 38–51)
HGB BLD-MCNC: 15 G/DL (ref 13–17.7)
HGB BLDA-MCNC: 12.9 G/DL (ref 14–18)
HOLD SPECIMEN: NORMAL
HOLD SPECIMEN: NORMAL
IMM GRANULOCYTES # BLD AUTO: 0.05 10*3/MM3 (ref 0–0.05)
IMM GRANULOCYTES NFR BLD AUTO: 0.6 % (ref 0–0.5)
INHALED O2 CONCENTRATION: 21 %
LYMPHOCYTES # BLD AUTO: 1.5 10*3/MM3 (ref 0.7–3.1)
LYMPHOCYTES NFR BLD AUTO: 17.4 % (ref 19.6–45.3)
Lab: ABNORMAL
MCH RBC QN AUTO: 29 PG (ref 26.6–33)
MCHC RBC AUTO-ENTMCNC: 34.2 G/DL (ref 31.5–35.7)
MCV RBC AUTO: 84.9 FL (ref 79–97)
METHGB BLD QL: 0 % (ref 0–3)
MODALITY: ABNORMAL
MONOCYTES # BLD AUTO: 0.78 10*3/MM3 (ref 0.1–0.9)
MONOCYTES NFR BLD AUTO: 9 % (ref 5–12)
NEUTROPHILS NFR BLD AUTO: 6.13 10*3/MM3 (ref 1.7–7)
NEUTROPHILS NFR BLD AUTO: 71 % (ref 42.7–76)
NOTE: ABNORMAL
NRBC BLD AUTO-RTO: 0 /100 WBC (ref 0–0.2)
OXYHGB MFR BLDV: 95.5 % (ref 94–99)
PCO2 BLDA: 39.1 MM HG (ref 35–45)
PCO2 TEMP ADJ BLD: ABNORMAL MM[HG]
PH BLDA: 7.43 PH UNITS (ref 7.35–7.45)
PH, TEMP CORRECTED: ABNORMAL
PLATELET # BLD AUTO: 175 10*3/MM3 (ref 140–450)
PMV BLD AUTO: 9.9 FL (ref 6–12)
PO2 BLDA: 78.4 MM HG (ref 83–108)
PO2 TEMP ADJ BLD: ABNORMAL MM[HG]
POTASSIUM SERPL-SCNC: 4.7 MMOL/L (ref 3.5–5.2)
PROT SERPL-MCNC: 6.7 G/DL (ref 6–8.5)
RBC # BLD AUTO: 5.17 10*6/MM3 (ref 4.14–5.8)
SAO2 % BLDCOA: 96.8 % (ref 94–99)
SODIUM SERPL-SCNC: 131 MMOL/L (ref 136–145)
VENTILATOR MODE: ABNORMAL
WBC # BLD AUTO: 8.63 10*3/MM3 (ref 3.4–10.8)
WHOLE BLOOD HOLD SPECIMEN: NORMAL
WHOLE BLOOD HOLD SPECIMEN: NORMAL

## 2021-09-17 PROCEDURE — 83050 HGB METHEMOGLOBIN QUAN: CPT

## 2021-09-17 PROCEDURE — 85025 COMPLETE CBC W/AUTO DIFF WBC: CPT | Performed by: PHYSICIAN ASSISTANT

## 2021-09-17 PROCEDURE — 82962 GLUCOSE BLOOD TEST: CPT

## 2021-09-17 PROCEDURE — 82805 BLOOD GASES W/O2 SATURATION: CPT

## 2021-09-17 PROCEDURE — 71045 X-RAY EXAM CHEST 1 VIEW: CPT

## 2021-09-17 PROCEDURE — 63710000001 INSULIN REGULAR HUMAN PER 5 UNITS: Performed by: PHYSICIAN ASSISTANT

## 2021-09-17 PROCEDURE — 86140 C-REACTIVE PROTEIN: CPT | Performed by: PHYSICIAN ASSISTANT

## 2021-09-17 PROCEDURE — 82009 KETONE BODYS QUAL: CPT | Performed by: PHYSICIAN ASSISTANT

## 2021-09-17 PROCEDURE — 82375 ASSAY CARBOXYHB QUANT: CPT

## 2021-09-17 PROCEDURE — 83036 HEMOGLOBIN GLYCOSYLATED A1C: CPT | Performed by: PHYSICIAN ASSISTANT

## 2021-09-17 PROCEDURE — 71045 X-RAY EXAM CHEST 1 VIEW: CPT | Performed by: RADIOLOGY

## 2021-09-17 PROCEDURE — 36600 WITHDRAWAL OF ARTERIAL BLOOD: CPT

## 2021-09-17 PROCEDURE — 80053 COMPREHEN METABOLIC PANEL: CPT | Performed by: PHYSICIAN ASSISTANT

## 2021-09-17 PROCEDURE — 99283 EMERGENCY DEPT VISIT LOW MDM: CPT

## 2021-09-17 RX ORDER — SODIUM CHLORIDE 0.9 % (FLUSH) 0.9 %
10 SYRINGE (ML) INJECTION AS NEEDED
Status: DISCONTINUED | OUTPATIENT
Start: 2021-09-17 | End: 2021-09-18 | Stop reason: HOSPADM

## 2021-09-17 RX ADMIN — HUMAN INSULIN 5 UNITS: 100 INJECTION, SOLUTION SUBCUTANEOUS at 21:23

## 2021-09-17 RX ADMIN — SODIUM CHLORIDE 1000 ML: 9 INJECTION, SOLUTION INTRAVENOUS at 20:10

## 2021-09-17 RX ADMIN — HUMAN INSULIN 10 UNITS: 100 INJECTION, SOLUTION SUBCUTANEOUS at 21:21

## 2021-09-17 RX ADMIN — SODIUM CHLORIDE 500 ML: 9 INJECTION, SOLUTION INTRAVENOUS at 20:12

## 2021-09-17 NOTE — ED NOTES
MEDICAL SCREENING:    Reason for Visit: hyperglycemia, recent covid and decadron use     Patient initially seen in triage.  The patient was advised further evaluation and diagnostic testing will be needed, some of the treatment and testing will be initiated in the lobby in order to begin the process.  The patient will be returned to the waiting area for the time being and possibly be re-assessed by a subsequent ED provider.  The patient will be brought back to the treatment area in as timely manner as possible.       Bernadine Morley PA  09/17/21 3820

## 2021-09-18 NOTE — ED PROVIDER NOTES
Subjective   66-year-old male with past medical history of arthritis, diabetes, GERD, and nephrolithiasis presents to the emergency room with hyperglycemia.  Patient states he tested positive for Covid on September 2.  He does state that today his blood sugar is high.  He denies any abdominal pain, chest pain, shortness of breath, nausea, vomiting, or pain.  He does state that he has been on Decadron recently secondary to Covid.  Denies any specific aggravating or alleviating factors.  Denies any other complaints or concerns at this time      History provided by:  Patient   used: No        Review of Systems   Constitutional: Negative.  Negative for fever.   HENT: Negative.    Respiratory: Negative.    Cardiovascular: Negative.  Negative for chest pain.   Gastrointestinal: Negative.  Negative for abdominal pain.   Endocrine: Negative.         (+) Hyperglycemia   Genitourinary: Negative.  Negative for dysuria.   Skin: Negative.    Neurological: Negative.    Psychiatric/Behavioral: Negative.    All other systems reviewed and are negative.      Past Medical History:   Diagnosis Date   • Anxiety    • Arthritis    • Diabetes mellitus (CMS/HCC)    • Epididymitis    • GERD (gastroesophageal reflux disease)    • Heartburn    • Hydrocele    • Kidney stone        Allergies   Allergen Reactions   • Bactrim [Sulfamethoxazole-Trimethoprim] Rash and Other (See Comments)     Flu like symptoms       Past Surgical History:   Procedure Laterality Date   • COLONOSCOPY     • CYSTOSCOPY W/ URETEROSCOPY W/ LITHOTRIPSY     • FINGER SURGERY     • HERNIA REPAIR     • SCROTAL EXPLORATION Left 6/28/2018    Procedure: SCROTAL EXPLORATION/ INGUINAL EXPLORATION HEMIORCHICETOMY WITH FROZEN SECTION.;  Surgeon: Edvin Mars MD;  Location: [facility];  Service: Urology       Family History   Problem Relation Age of Onset   • No Known Problems Father    • No Known Problems Mother        Social History     Socioeconomic  History   • Marital status:      Spouse name: Not on file   • Number of children: Not on file   • Years of education: Not on file   • Highest education level: Not on file   Tobacco Use   • Smoking status: Never Smoker   • Smokeless tobacco: Never Used   Substance and Sexual Activity   • Alcohol use: No   • Drug use: No   • Sexual activity: Yes     Partners: Female           Objective   Physical Exam  Vitals and nursing note reviewed.   Constitutional:       General: He is not in acute distress.     Appearance: He is well-developed. He is not diaphoretic.   HENT:      Head: Normocephalic and atraumatic.      Right Ear: External ear normal.      Left Ear: External ear normal.      Nose: Nose normal.   Eyes:      Conjunctiva/sclera: Conjunctivae normal.      Pupils: Pupils are equal, round, and reactive to light.   Neck:      Vascular: No JVD.      Trachea: No tracheal deviation.   Cardiovascular:      Rate and Rhythm: Normal rate and regular rhythm.      Heart sounds: Normal heart sounds. No murmur heard.     Pulmonary:      Effort: Pulmonary effort is normal. No respiratory distress.      Breath sounds: Normal breath sounds. No wheezing.   Abdominal:      General: Bowel sounds are normal.      Palpations: Abdomen is soft.      Tenderness: There is no abdominal tenderness.   Musculoskeletal:         General: No deformity. Normal range of motion.      Cervical back: Normal range of motion and neck supple.   Skin:     General: Skin is warm and dry.      Coloration: Skin is not pale.      Findings: No erythema or rash.   Neurological:      Mental Status: He is alert and oriented to person, place, and time.      Cranial Nerves: No cranial nerve deficit.   Psychiatric:         Behavior: Behavior normal.         Thought Content: Thought content normal.         Procedures           ED Course  ED Course as of Sep 17 2204   Fri Sep 17, 2021   2050 XR Chest 1 View [TK]   2054 Acetone: Negative [TK]   2202 Glucose 230.     [TK]      ED Course User Index  [TK] John Bowen PA-C                                           MDM  Number of Diagnoses or Management Options  Hyperglycemia: new and requires workup     Amount and/or Complexity of Data Reviewed  Clinical lab tests: reviewed and ordered  Tests in the radiology section of CPT®: reviewed and ordered    Risk of Complications, Morbidity, and/or Mortality  Presenting problems: moderate  Diagnostic procedures: moderate  Management options: moderate    Patient Progress  Patient progress: stable      Final diagnoses:   Hyperglycemia       ED Disposition  ED Disposition     ED Disposition Condition Comment    Discharge Stable           Kreis, Samuel Duane, MD  [address] [place] [address] [address] [place] [address] [phone]    In 2 days           Medication List      No changes were made to your prescriptions during this visit.          John Bowen PA-C  09/17/21 3594

## 2021-09-18 NOTE — ED NOTES
Pt provided urinal and advised the need for sample, pt stated that he isnt able at this time      Bam Trivedi  09/17/21 2295

## 2022-08-17 ENCOUNTER — HOSPITAL ENCOUNTER (EMERGENCY)
Facility: HOSPITAL | Age: 67
Discharge: HOME | End: 2022-08-18
Attending: EMERGENCY MEDICINE | Admitting: EMERGENCY MEDICINE

## 2022-08-17 VITALS
SYSTOLIC BLOOD PRESSURE: 169 MMHG | HEART RATE: 99 BPM | BODY MASS INDEX: 23.7 KG/M2 | TEMPERATURE: 97.5 F | OXYGEN SATURATION: 97 % | WEIGHT: 160 LBS | RESPIRATION RATE: 16 BRPM | DIASTOLIC BLOOD PRESSURE: 77 MMHG | HEIGHT: 69 IN

## 2022-08-17 DIAGNOSIS — M21.862 GASTROCNEMIUS EQUINUS, LEFT: Primary | ICD-10-CM

## 2022-08-17 LAB
ALBUMIN SERPL-MCNC: 4.38 G/DL (ref 3.5–5.2)
ALBUMIN/GLOB SERPL: 2.4 G/DL
ALP SERPL-CCNC: 75 U/L (ref 39–117)
ALT SERPL W P-5'-P-CCNC: 30 U/L (ref 1–41)
ANION GAP SERPL CALCULATED.3IONS-SCNC: 10.4 MMOL/L (ref 5–15)
AST SERPL-CCNC: 19 U/L (ref 1–40)
BASOPHILS # BLD AUTO: 0.04 10*3/MM3 (ref 0–0.2)
BASOPHILS NFR BLD AUTO: 0.5 % (ref 0–1.5)
BILIRUB SERPL-MCNC: 0.4 MG/DL (ref 0–1.2)
BUN SERPL-MCNC: 20 MG/DL (ref 8–23)
BUN/CREAT SERPL: 18.2 (ref 7–25)
CALCIUM SPEC-SCNC: 9.5 MG/DL (ref 8.6–10.5)
CHLORIDE SERPL-SCNC: 109 MMOL/L (ref 98–107)
CO2 SERPL-SCNC: 23.6 MMOL/L (ref 22–29)
CREAT SERPL-MCNC: 1.1 MG/DL (ref 0.76–1.27)
DEPRECATED RDW RBC AUTO: 40.4 FL (ref 37–54)
EGFRCR SERPLBLD CKD-EPI 2021: 74 ML/MIN/1.73
EOSINOPHIL # BLD AUTO: 0.17 10*3/MM3 (ref 0–0.4)
EOSINOPHIL NFR BLD AUTO: 2 % (ref 0.3–6.2)
ERYTHROCYTE [DISTWIDTH] IN BLOOD BY AUTOMATED COUNT: 12.5 % (ref 12.3–15.4)
GLOBULIN UR ELPH-MCNC: 1.8 GM/DL
GLUCOSE SERPL-MCNC: 127 MG/DL (ref 65–99)
HCT VFR BLD AUTO: 34.5 % (ref 37.5–51)
HGB BLD-MCNC: 11.8 G/DL (ref 13–17.7)
IMM GRANULOCYTES # BLD AUTO: 0.02 10*3/MM3 (ref 0–0.05)
IMM GRANULOCYTES NFR BLD AUTO: 0.2 % (ref 0–0.5)
LYMPHOCYTES # BLD AUTO: 1.84 10*3/MM3 (ref 0.7–3.1)
LYMPHOCYTES NFR BLD AUTO: 21.7 % (ref 19.6–45.3)
MCH RBC QN AUTO: 30 PG (ref 26.6–33)
MCHC RBC AUTO-ENTMCNC: 34.2 G/DL (ref 31.5–35.7)
MCV RBC AUTO: 87.8 FL (ref 79–97)
MONOCYTES # BLD AUTO: 0.59 10*3/MM3 (ref 0.1–0.9)
MONOCYTES NFR BLD AUTO: 7 % (ref 5–12)
NEUTROPHILS NFR BLD AUTO: 5.8 10*3/MM3 (ref 1.7–7)
NEUTROPHILS NFR BLD AUTO: 68.6 % (ref 42.7–76)
NRBC BLD AUTO-RTO: 0 /100 WBC (ref 0–0.2)
PLATELET # BLD AUTO: 168 10*3/MM3 (ref 140–450)
PMV BLD AUTO: 9.8 FL (ref 6–12)
POTASSIUM SERPL-SCNC: 4.6 MMOL/L (ref 3.5–5.2)
PROT SERPL-MCNC: 6.2 G/DL (ref 6–8.5)
RBC # BLD AUTO: 3.93 10*6/MM3 (ref 4.14–5.8)
SODIUM SERPL-SCNC: 143 MMOL/L (ref 136–145)
WBC NRBC COR # BLD: 8.46 10*3/MM3 (ref 3.4–10.8)

## 2022-08-17 PROCEDURE — 85025 COMPLETE CBC W/AUTO DIFF WBC: CPT | Performed by: EMERGENCY MEDICINE

## 2022-08-17 PROCEDURE — 80053 COMPREHEN METABOLIC PANEL: CPT | Performed by: EMERGENCY MEDICINE

## 2022-08-17 PROCEDURE — 99283 EMERGENCY DEPT VISIT LOW MDM: CPT

## 2022-08-17 RX ORDER — HYDROCODONE BITARTRATE AND ACETAMINOPHEN 10; 325 MG/1; MG/1
1 TABLET ORAL ONCE
Status: COMPLETED | OUTPATIENT
Start: 2022-08-17 | End: 2022-08-17

## 2022-08-17 RX ADMIN — HYDROCODONE BITARTRATE AND ACETAMINOPHEN 1 TABLET: 10; 325 TABLET ORAL at 23:56

## 2022-08-18 ENCOUNTER — APPOINTMENT (OUTPATIENT)
Dept: CT IMAGING | Facility: HOSPITAL | Age: 67
End: 2022-08-18

## 2022-08-18 PROCEDURE — 73701 CT LOWER EXTREMITY W/DYE: CPT

## 2022-08-18 PROCEDURE — 0 IOPAMIDOL PER 1 ML: Performed by: EMERGENCY MEDICINE

## 2022-08-18 RX ORDER — HYDROCODONE BITARTRATE AND ACETAMINOPHEN 7.5; 325 MG/1; MG/1
1 TABLET ORAL EVERY 8 HOURS PRN
Qty: 10 TABLET | Refills: 0 | Status: ON HOLD | OUTPATIENT
Start: 2022-08-18 | End: 2022-09-07

## 2022-08-18 RX ADMIN — IOPAMIDOL 89 ML: 755 INJECTION, SOLUTION INTRAVENOUS at 00:32

## 2022-08-18 NOTE — ED PROVIDER NOTES
Subjective   Sudden onset pain left leg posteriorly below knee while playing game.      Leg Pain  Location:  Leg  Leg location:  L lower leg  Pain details:     Quality:  Aching, shooting and tearing    Severity:  Severe    Onset quality:  Sudden    Timing:  Constant  Chronicity:  New  Dislocation: no        Review of Systems   Constitutional: Positive for activity change.   HENT: Negative.    Eyes: Negative.    Respiratory: Negative.    Cardiovascular: Negative.    Gastrointestinal: Negative.    Endocrine: Negative.    Genitourinary: Negative.    Musculoskeletal: Positive for gait problem.   Skin: Negative.    Allergic/Immunologic: Negative.    Hematological: Negative.    Psychiatric/Behavioral: Negative.        Past Medical History:   Diagnosis Date   • Anxiety    • Arthritis    • Diabetes mellitus (CMS/HCC)    • Epididymitis    • GERD (gastroesophageal reflux disease)    • Heartburn    • Hydrocele    • Kidney stone        Allergies   Allergen Reactions   • Bactrim [Sulfamethoxazole-Trimethoprim] Rash and Other (See Comments)     Flu like symptoms       Past Surgical History:   Procedure Laterality Date   • COLONOSCOPY     • CYSTOSCOPY W/ URETEROSCOPY W/ LITHOTRIPSY     • FINGER SURGERY     • HERNIA REPAIR     • SCROTAL EXPLORATION Left 6/28/2018    Procedure: SCROTAL EXPLORATION/ INGUINAL EXPLORATION HEMIORCHICETOMY WITH FROZEN SECTION.;  Surgeon: Edvin Mars MD;  Location: [facility];  Service: Urology       Family History   Problem Relation Age of Onset   • No Known Problems Father    • No Known Problems Mother        Social History     Socioeconomic History   • Marital status:    Tobacco Use   • Smoking status: Never Smoker   • Smokeless tobacco: Never Used   Substance and Sexual Activity   • Alcohol use: No   • Drug use: No   • Sexual activity: Yes     Partners: Female           Objective   Physical Exam  Vitals and nursing note reviewed.   Constitutional:       General: He is in acute  distress.   HENT:      Head: Normocephalic.      Nose: Nose normal.      Mouth/Throat:      Mouth: Mucous membranes are moist.   Eyes:      Pupils: Pupils are equal, round, and reactive to light.   Cardiovascular:      Pulses: Normal pulses.   Pulmonary:      Effort: Pulmonary effort is normal.   Abdominal:      General: Abdomen is flat.   Musculoskeletal:      Cervical back: Normal range of motion.      Comments: Tender left gastrocnemius to palpation. Achilles not tender   Skin:     General: Skin is warm.      Capillary Refill: Capillary refill takes less than 2 seconds.   Neurological:      General: No focal deficit present.      Mental Status: He is alert.   Psychiatric:         Mood and Affect: Mood normal.         Procedures           ED Course  ED Course as of 08/18/22 1452   Thu Aug 18, 2022   0123 CT lower extremity : negative [GIA]      ED Course User Index  [GIA] Espinoza Morley MD                                           [facility]    Final diagnoses:   Gastrocnemius equinus, left       ED Disposition  ED Disposition     ED Disposition   Discharge    Condition   Stable    Comment   --             Kreis, Samuel Duane, MD  [address] [place] [address]   [facility] [address] [phone]    Schedule an appointment as soon as possible for a visit   If symptoms worsen         Medication List      New Prescriptions    HYDROcodone-acetaminophen 7.5-325 MG per tablet  Commonly known as: NORCO  Take 1 tablet by mouth Every 8 (Eight) Hours As Needed for Moderate Pain .           Where to Get Your Medications      These medications were sent to [address], [place] - [address] [facility] [address] - [phone]  - [phone] FX  [address] [address]    Phone: [phone]   · HYDROcodone-acetaminophen 7.5-325 MG per tablet          Espinoza Morley MD  08/18/22 0108       Espinoza Morley MD  08/18/22 0121       Espinoza Morley MD  08/18/22 1453

## 2022-08-29 ENCOUNTER — OFFICE VISIT (OUTPATIENT)
Dept: ORTHOPEDIC SURGERY | Facility: CLINIC | Age: 67
End: 2022-08-29

## 2022-08-29 VITALS
HEIGHT: 69 IN | WEIGHT: 160 LBS | HEART RATE: 95 BPM | BODY MASS INDEX: 23.7 KG/M2 | DIASTOLIC BLOOD PRESSURE: 70 MMHG | SYSTOLIC BLOOD PRESSURE: 120 MMHG

## 2022-08-29 DIAGNOSIS — S86.112A STRAIN OF GASTROCNEMIUS MUSCLE OF LEFT LOWER EXTREMITY, INITIAL ENCOUNTER: Primary | ICD-10-CM

## 2022-08-29 PROCEDURE — 99203 OFFICE O/P NEW LOW 30 MIN: CPT | Performed by: PHYSICIAN ASSISTANT

## 2022-08-29 RX ORDER — NAPROXEN 500 MG/1
500 TABLET ORAL 2 TIMES DAILY WITH MEALS
Qty: 60 TABLET | Refills: 2 | Status: SHIPPED | OUTPATIENT
Start: 2022-08-29

## 2022-08-29 RX ORDER — ICOSAPENT ETHYL 1000 MG/1
2 CAPSULE ORAL 2 TIMES DAILY WITH MEALS
COMMUNITY
Start: 2022-06-22

## 2022-08-29 RX ORDER — METHOCARBAMOL 750 MG/1
TABLET, FILM COATED ORAL
Status: ON HOLD | COMMUNITY
Start: 2022-08-24 | End: 2022-09-07

## 2022-08-29 RX ORDER — ROSUVASTATIN CALCIUM 5 MG/1
TABLET, COATED ORAL
Status: ON HOLD | COMMUNITY
Start: 2022-08-25 | End: 2022-09-07

## 2022-08-29 RX ORDER — OMEGA-3-ACID ETHYL ESTERS 1 G/1
CAPSULE, LIQUID FILLED ORAL
COMMUNITY
Start: 2022-08-25 | End: 2022-08-29 | Stop reason: SDUPTHER

## 2022-08-29 NOTE — PROGRESS NOTES
[facility] [place]          Patient: Evan Salazar  YOB: 1955  Date of Encounter: 08/29/2022  PCP: Kreis, Samuel Duane, MD      Subjective     Chief Complaint   Patient presents with   • Left Lower Leg - Initial Evaluation, Pain           History of Present Illness:     Evan Salazar is a 67 y.o. male presents today as result of an acute twisting left lower leg injury.  Patient was playing pickle ball when he attempted to pivot and felt a sharp popping sensation to the posterior lateral aspect of lower leg.  He had subsequent pain and swelling and bruising and presented to the emergency department secondary complications with weightbearing.  Patient was given a knee scooter and has been ambulating topical medication as well as occasional neoprene compression sleeve and uses of the knee scooter for long period of ambulation.  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  Patient is had progressive pain with occasional swelling progressing down the lower extremity into the ankle.  He denies any paresthesias.        Patient Active Problem List   Diagnosis   • Mass of left testicle     Past Medical History:   Diagnosis Date   • Anxiety    • Arthritis    • Diabetes mellitus (HCC)    • Epididymitis    • GERD (gastroesophageal reflux disease)    • Heartburn    • Hydrocele    • Kidney stone      Past Surgical History:   Procedure Laterality Date   • COLONOSCOPY     • CYSTOSCOPY W/ URETEROSCOPY W/ LITHOTRIPSY     • FINGER SURGERY     • HERNIA REPAIR     • SCROTAL EXPLORATION Left 6/28/2018    Procedure: SCROTAL EXPLORATION/ INGUINAL EXPLORATION HEMIORCHICETOMY WITH FROZEN SECTION.;  Surgeon: Edvin Mars MD;  Location: [facility] OR;  Service: Urology     Social History     Occupational History   • Not on file   Tobacco Use   • Smoking status: Never Smoker   • Smokeless tobacco: Never Used   Vaping Use   • Vaping Use: Never used    Substance and Sexual Activity   • Alcohol use: No   • Drug use: No   • Sexual activity: Yes     Partners: Female    Evan Salazar  reports that he has never smoked. He has never used smokeless tobacco.. I have educated him on the risk of diseases from using tobacco products such as cancer, COPD and heart disease.           Social History     Social History Narrative   • Not on file     Family History   Problem Relation Age of Onset   • No Known Problems Father    • No Known Problems Mother      Current Outpatient Medications   Medication Sig Dispense Refill   • albuterol sulfate  (90 Base) MCG/ACT inhaler Inhale 2 puffs Every 6 (Six) Hours As Needed for Wheezing or Shortness of Air. 8 g 0   • esomeprazole (nexIUM) 40 MG capsule Daily.     • icosapent ethyl (VASCEPA) 1 g capsule capsule      • lisinopril (PRINIVIL,ZESTRIL) 5 MG tablet Daily.     • metFORMIN ER (GLUCOPHAGE-XR) 500 MG 24 hr tablet Take 500 mg by mouth Daily With Breakfast.     • methocarbamol (ROBAXIN) 750 MG tablet      • rosuvastatin (CRESTOR) 5 MG tablet      • HYDROcodone-acetaminophen (NORCO) 7.5-325 MG per tablet Take 1 tablet by mouth Every 8 (Eight) Hours As Needed for Moderate Pain . 10 tablet 0   • naproxen (NAPROSYN) 500 MG tablet Take 1 tablet by mouth 2 (Two) Times a Day With Meals. 60 tablet 2     No current facility-administered medications for this visit.     Allergies   Allergen Reactions   • Bactrim [Sulfamethoxazole-Trimethoprim] Rash and Other (See Comments)     Flu like symptoms            Review of Systems   Constitutional: Negative.   HENT: Negative.    Eyes: Negative.    Cardiovascular: Negative.    Respiratory: Negative.    Endocrine: Negative.    Hematologic/Lymphatic: Negative.    Skin: Negative.    Musculoskeletal:        Pertinent positives listed in HPI   Gastrointestinal: Negative.    Genitourinary: Negative.    Neurological: Negative.    Psychiatric/Behavioral: Negative.    Allergic/Immunologic: Negative.   "        Objective      Vitals:    08/29/22 1409   BP: 120/70   Pulse: 95   Weight: 72.6 kg (160 lb)   Height: 175.3 cm (69\")      BMI is within normal parameters. No other follow-up for BMI required.      Physical Exam  Vitals and nursing note reviewed.   Constitutional:       General: He is not in acute distress.     Appearance: Normal appearance. He is not ill-appearing.   HENT:      Head: Normocephalic and atraumatic.      Right Ear: External ear normal.      Left Ear: External ear normal.      Nose: Nose normal.      Mouth/Throat:      Mouth: Mucous membranes are moist.      Pharynx: Oropharynx is clear.   Eyes:      Extraocular Movements: Extraocular movements intact.      Conjunctiva/sclera: Conjunctivae normal.      Pupils: Pupils are equal, round, and reactive to light.   Cardiovascular:      Rate and Rhythm: Normal rate.      Pulses: Normal pulses.   Pulmonary:      Effort: Pulmonary effort is normal.   Abdominal:      General: There is no distension.   Musculoskeletal:      Cervical back: Normal range of motion. No rigidity.      Left lower leg: Swelling, tenderness (gastrocnemius, swelling and resolving eccymosis in this region) and bony tenderness present. No deformity. 1+ Edema present.      Comments: Adequate strength with oppositional flexion and extension. Austin and Jennifer's testing negative.    Skin:     General: Skin is warm and dry.      Capillary Refill: Capillary refill takes less than 2 seconds.   Neurological:      General: No focal deficit present.      Mental Status: He is alert and oriented to person, place, and time.      Cranial Nerves: Cranial nerves are intact.   Psychiatric:         Mood and Affect: Mood normal.         Behavior: Behavior normal.                 Radiology:      CT Lower Extremity Left With Contrast    Result Date: 8/18/2022  Negative examination Signer Name: Tylor Adkins MD  Signed: 8/18/2022 1:00 AM  Workstation Name: [id number]  [facility] " [place]            Assessment/Plan        ICD-10-CM ICD-9-CM   1. Strain of gastrocnemius muscle of left lower extremity, initial encounter  S86.112A 844.8       67-year-old male with a 2-week history of a musculotendinous gastrocnemius strain.  Patient will begin to slowly progress with range of motion as pain and swelling dictate.  He was provided with a prescription for Naprosyn 500 mg 1 p.o. twice daily times #60 with 2 refills.  He will also implement formal outpatient physical therapy with modalities as deemed appropriate.  He will return back in 3 weeks for further evaluation.                   This document was signed by Dedrick Bess PA-C August 29, 2022     CC: Kreis, Samuel Duane, MD      Dictated Utilizing Dragon Dictation: Part of this note may be an electronic transcription/translation of spoken language to printed text using the Dragon Dictation System.

## 2022-09-06 ENCOUNTER — APPOINTMENT (OUTPATIENT)
Dept: GENERAL RADIOLOGY | Facility: HOSPITAL | Age: 67
End: 2022-09-06

## 2022-09-06 PROCEDURE — 99285 EMERGENCY DEPT VISIT HI MDM: CPT

## 2022-09-06 PROCEDURE — 93005 ELECTROCARDIOGRAM TRACING: CPT | Performed by: STUDENT IN AN ORGANIZED HEALTH CARE EDUCATION/TRAINING PROGRAM

## 2022-09-06 PROCEDURE — 93010 ELECTROCARDIOGRAM REPORT: CPT | Performed by: INTERNAL MEDICINE

## 2022-09-06 PROCEDURE — 36415 COLL VENOUS BLD VENIPUNCTURE: CPT

## 2022-09-07 ENCOUNTER — APPOINTMENT (OUTPATIENT)
Dept: CT IMAGING | Facility: HOSPITAL | Age: 67
End: 2022-09-07

## 2022-09-07 ENCOUNTER — APPOINTMENT (OUTPATIENT)
Dept: ULTRASOUND IMAGING | Facility: HOSPITAL | Age: 67
End: 2022-09-07

## 2022-09-07 ENCOUNTER — APPOINTMENT (OUTPATIENT)
Dept: GENERAL RADIOLOGY | Facility: HOSPITAL | Age: 67
End: 2022-09-07

## 2022-09-07 ENCOUNTER — HOSPITAL ENCOUNTER (INPATIENT)
Facility: HOSPITAL | Age: 67
LOS: 4 days | Discharge: HOME | End: 2022-09-11
Attending: STUDENT IN AN ORGANIZED HEALTH CARE EDUCATION/TRAINING PROGRAM | Admitting: INTERNAL MEDICINE

## 2022-09-07 DIAGNOSIS — U07.1 PNEUMONIA DUE TO COVID-19 VIRUS: Primary | ICD-10-CM

## 2022-09-07 DIAGNOSIS — J12.82 PNEUMONIA DUE TO COVID-19 VIRUS: Primary | ICD-10-CM

## 2022-09-07 LAB
A-A DO2: 39.1 MMHG (ref 0–300)
ALBUMIN SERPL-MCNC: 4.33 G/DL (ref 3.5–5.2)
ALBUMIN/GLOB SERPL: 1.8 G/DL
ALP SERPL-CCNC: 78 U/L (ref 39–117)
ALT SERPL W P-5'-P-CCNC: 28 U/L (ref 1–41)
ANION GAP SERPL CALCULATED.3IONS-SCNC: 13.6 MMOL/L (ref 5–15)
ARTERIAL PATENCY WRIST A: POSITIVE
AST SERPL-CCNC: 18 U/L (ref 1–40)
ATMOSPHERIC PRESS: 727 MMHG
BACTERIA UR QL AUTO: ABNORMAL /HPF
BASE EXCESS BLDA CALC-SCNC: -0.6 MMOL/L (ref 0–2)
BDY SITE: ABNORMAL
BILIRUB SERPL-MCNC: 1 MG/DL (ref 0–1.2)
BILIRUB UR QL STRIP: ABNORMAL
BODY TEMPERATURE: 0 C
BUN SERPL-MCNC: 24 MG/DL (ref 8–23)
BUN/CREAT SERPL: 15.2 (ref 7–25)
CALCIUM SPEC-SCNC: 9.1 MG/DL (ref 8.6–10.5)
CHLORIDE SERPL-SCNC: 99 MMOL/L (ref 98–107)
CLARITY UR: ABNORMAL
CO2 BLDA-SCNC: 24.9 MMOL/L (ref 22–33)
CO2 SERPL-SCNC: 23.4 MMOL/L (ref 22–29)
COHGB MFR BLD: 1.5 % (ref 0–5)
COLOR UR: ABNORMAL
CREAT SERPL-MCNC: 1.58 MG/DL (ref 0.76–1.27)
D-LACTATE SERPL-SCNC: 1.7 MMOL/L (ref 0.5–2)
D-LACTATE SERPL-SCNC: 2.2 MMOL/L (ref 0.5–2)
DEPRECATED RDW RBC AUTO: 41.6 FL (ref 37–54)
EGFRCR SERPLBLD CKD-EPI 2021: 47.9 ML/MIN/1.73
ERYTHROCYTE [DISTWIDTH] IN BLOOD BY AUTOMATED COUNT: 12.6 % (ref 12.3–15.4)
FLUAV RNA RESP QL NAA+PROBE: NOT DETECTED
FLUBV RNA RESP QL NAA+PROBE: NOT DETECTED
GLOBULIN UR ELPH-MCNC: 2.5 GM/DL
GLUCOSE BLDC GLUCOMTR-MCNC: 135 MG/DL (ref 70–130)
GLUCOSE BLDC GLUCOMTR-MCNC: 148 MG/DL (ref 70–130)
GLUCOSE BLDC GLUCOMTR-MCNC: 207 MG/DL (ref 70–130)
GLUCOSE BLDC GLUCOMTR-MCNC: 284 MG/DL (ref 70–130)
GLUCOSE SERPL-MCNC: 184 MG/DL (ref 65–99)
GLUCOSE UR STRIP-MCNC: ABNORMAL MG/DL
HBA1C MFR BLD: 6.8 % (ref 4.8–5.6)
HCO3 BLDA-SCNC: 23.8 MMOL/L (ref 20–26)
HCT VFR BLD AUTO: 37.3 % (ref 37.5–51)
HCT VFR BLD CALC: 37.3 % (ref 38–51)
HGB BLD-MCNC: 12.7 G/DL (ref 13–17.7)
HGB BLDA-MCNC: 12.2 G/DL (ref 14–18)
HGB UR QL STRIP.AUTO: NEGATIVE
HYALINE CASTS UR QL AUTO: ABNORMAL /LPF
INHALED O2 CONCENTRATION: 21 %
KETONES UR QL STRIP: ABNORMAL
LEUKOCYTE ESTERASE UR QL STRIP.AUTO: NEGATIVE
LYMPHOCYTES # BLD MANUAL: 0.65 10*3/MM3 (ref 0.7–3.1)
LYMPHOCYTES NFR BLD MANUAL: 6 % (ref 5–12)
Lab: ABNORMAL
MAGNESIUM SERPL-MCNC: 1.4 MG/DL (ref 1.6–2.4)
MCH RBC QN AUTO: 30.8 PG (ref 26.6–33)
MCHC RBC AUTO-ENTMCNC: 34 G/DL (ref 31.5–35.7)
MCV RBC AUTO: 90.3 FL (ref 79–97)
METAMYELOCYTES NFR BLD MANUAL: 1 % (ref 0–0)
METHGB BLD QL: 0.1 % (ref 0–3)
MODALITY: ABNORMAL
MONOCYTES # BLD: 0.65 10*3/MM3 (ref 0.1–0.9)
NEUTROPHILS # BLD AUTO: 9.43 10*3/MM3 (ref 1.7–7)
NEUTROPHILS NFR BLD MANUAL: 80 % (ref 42.7–76)
NEUTS BAND NFR BLD MANUAL: 7 % (ref 0–5)
NITRITE UR QL STRIP: NEGATIVE
NOTE: ABNORMAL
NOTIFIED BY: ABNORMAL
NOTIFIED WHO: ABNORMAL
NT-PROBNP SERPL-MCNC: 1730 PG/ML (ref 0–900)
OXYHGB MFR BLDV: 91.4 % (ref 94–99)
PCO2 BLDA: 37.4 MM HG (ref 35–45)
PCO2 TEMP ADJ BLD: ABNORMAL MM[HG]
PH BLDA: 7.41 PH UNITS (ref 7.35–7.45)
PH UR STRIP.AUTO: <=5 [PH] (ref 5–8)
PH, TEMP CORRECTED: ABNORMAL
PLATELET # BLD AUTO: 135 10*3/MM3 (ref 140–450)
PMV BLD AUTO: 9.9 FL (ref 6–12)
PO2 BLDA: 61.3 MM HG (ref 83–108)
PO2 TEMP ADJ BLD: ABNORMAL MM[HG]
POTASSIUM SERPL-SCNC: 5 MMOL/L (ref 3.5–5.2)
PROCALCITONIN SERPL-MCNC: 6.43 NG/ML (ref 0–0.25)
PROT SERPL-MCNC: 6.8 G/DL (ref 6–8.5)
PROT UR QL STRIP: ABNORMAL
QT INTERVAL: 298 MS
QTC INTERVAL: 406 MS
RBC # BLD AUTO: 4.13 10*6/MM3 (ref 4.14–5.8)
RBC # UR STRIP: ABNORMAL /HPF
RBC MORPH BLD: NORMAL
REF LAB TEST METHOD: ABNORMAL
SAO2 % BLDCOA: 92.9 % (ref 94–99)
SARS-COV-2 RNA RESP QL NAA+PROBE: DETECTED
SMALL PLATELETS BLD QL SMEAR: ABNORMAL
SODIUM SERPL-SCNC: 136 MMOL/L (ref 136–145)
SP GR UR STRIP: >1.03 (ref 1–1.03)
SQUAMOUS #/AREA URNS HPF: ABNORMAL /HPF
TROPONIN T SERPL-MCNC: <0.01 NG/ML (ref 0–0.03)
UROBILINOGEN UR QL STRIP: ABNORMAL
VARIANT LYMPHS NFR BLD MANUAL: 6 % (ref 19.6–45.3)
VENTILATOR MODE: ABNORMAL
WBC # UR STRIP: ABNORMAL /HPF
WBC NRBC COR # BLD: 10.84 10*3/MM3 (ref 3.4–10.8)

## 2022-09-07 PROCEDURE — 87636 SARSCOV2 & INF A&B AMP PRB: CPT | Performed by: PHYSICIAN ASSISTANT

## 2022-09-07 PROCEDURE — 25010000002 CEFTRIAXONE PER 250 MG: Performed by: PHYSICIAN ASSISTANT

## 2022-09-07 PROCEDURE — 85007 BL SMEAR W/DIFF WBC COUNT: CPT | Performed by: STUDENT IN AN ORGANIZED HEALTH CARE EDUCATION/TRAINING PROGRAM

## 2022-09-07 PROCEDURE — 82375 ASSAY CARBOXYHB QUANT: CPT

## 2022-09-07 PROCEDURE — 36600 WITHDRAWAL OF ARTERIAL BLOOD: CPT

## 2022-09-07 PROCEDURE — 92610 EVALUATE SWALLOWING FUNCTION: CPT

## 2022-09-07 PROCEDURE — 97165 OT EVAL LOW COMPLEX 30 MIN: CPT

## 2022-09-07 PROCEDURE — 81001 URINALYSIS AUTO W/SCOPE: CPT | Performed by: INTERNAL MEDICINE

## 2022-09-07 PROCEDURE — XW033E5 INTRODUCTION OF REMDESIVIR ANTI-INFECTIVE INTO PERIPHERAL VEIN, PERCUTANEOUS APPROACH, NEW TECHNOLOGY GROUP 5: ICD-10-PCS | Performed by: INTERNAL MEDICINE

## 2022-09-07 PROCEDURE — 25010000002 REMDESIVIR 100 MG RECONSTITUTED SOLUTION: Performed by: INTERNAL MEDICINE

## 2022-09-07 PROCEDURE — 63710000001 INSULIN ASPART PER 5 UNITS: Performed by: INTERNAL MEDICINE

## 2022-09-07 PROCEDURE — 99223 1ST HOSP IP/OBS HIGH 75: CPT | Performed by: PHYSICIAN ASSISTANT

## 2022-09-07 PROCEDURE — 82962 GLUCOSE BLOOD TEST: CPT

## 2022-09-07 PROCEDURE — 87205 SMEAR GRAM STAIN: CPT | Performed by: INTERNAL MEDICINE

## 2022-09-07 PROCEDURE — 87070 CULTURE OTHR SPECIMN AEROBIC: CPT | Performed by: INTERNAL MEDICINE

## 2022-09-07 PROCEDURE — 63710000001 DEXAMETHASONE PER 0.25 MG: Performed by: INTERNAL MEDICINE

## 2022-09-07 PROCEDURE — 84484 ASSAY OF TROPONIN QUANT: CPT | Performed by: STUDENT IN AN ORGANIZED HEALTH CARE EDUCATION/TRAINING PROGRAM

## 2022-09-07 PROCEDURE — 83735 ASSAY OF MAGNESIUM: CPT | Performed by: INTERNAL MEDICINE

## 2022-09-07 PROCEDURE — 85025 COMPLETE CBC W/AUTO DIFF WBC: CPT | Performed by: STUDENT IN AN ORGANIZED HEALTH CARE EDUCATION/TRAINING PROGRAM

## 2022-09-07 PROCEDURE — 82805 BLOOD GASES W/O2 SATURATION: CPT

## 2022-09-07 PROCEDURE — 71045 X-RAY EXAM CHEST 1 VIEW: CPT

## 2022-09-07 PROCEDURE — 84145 PROCALCITONIN (PCT): CPT | Performed by: PHYSICIAN ASSISTANT

## 2022-09-07 PROCEDURE — 76775 US EXAM ABDO BACK WALL LIM: CPT

## 2022-09-07 PROCEDURE — 83880 ASSAY OF NATRIURETIC PEPTIDE: CPT | Performed by: PHYSICIAN ASSISTANT

## 2022-09-07 PROCEDURE — 83605 ASSAY OF LACTIC ACID: CPT | Performed by: PHYSICIAN ASSISTANT

## 2022-09-07 PROCEDURE — 25010000002 MAGNESIUM SULFATE 2 GM/50ML SOLUTION: Performed by: INTERNAL MEDICINE

## 2022-09-07 PROCEDURE — 80053 COMPREHEN METABOLIC PANEL: CPT | Performed by: STUDENT IN AN ORGANIZED HEALTH CARE EDUCATION/TRAINING PROGRAM

## 2022-09-07 PROCEDURE — 97162 PT EVAL MOD COMPLEX 30 MIN: CPT

## 2022-09-07 PROCEDURE — 87040 BLOOD CULTURE FOR BACTERIA: CPT | Performed by: PHYSICIAN ASSISTANT

## 2022-09-07 PROCEDURE — 83050 HGB METHEMOGLOBIN QUAN: CPT

## 2022-09-07 PROCEDURE — 71275 CT ANGIOGRAPHY CHEST: CPT

## 2022-09-07 PROCEDURE — 76775 US EXAM ABDO BACK WALL LIM: CPT | Performed by: RADIOLOGY

## 2022-09-07 PROCEDURE — 25010000002 HEPARIN (PORCINE) PER 1000 UNITS: Performed by: INTERNAL MEDICINE

## 2022-09-07 PROCEDURE — 0 IOPAMIDOL PER 1 ML: Performed by: STUDENT IN AN ORGANIZED HEALTH CARE EDUCATION/TRAINING PROGRAM

## 2022-09-07 PROCEDURE — 83036 HEMOGLOBIN GLYCOSYLATED A1C: CPT | Performed by: INTERNAL MEDICINE

## 2022-09-07 RX ORDER — MAGNESIUM SULFATE HEPTAHYDRATE 40 MG/ML
2 INJECTION, SOLUTION INTRAVENOUS
Status: COMPLETED | OUTPATIENT
Start: 2022-09-07 | End: 2022-09-07

## 2022-09-07 RX ORDER — METHOCARBAMOL 750 MG/1
750 TABLET, FILM COATED ORAL 3 TIMES DAILY PRN
COMMUNITY

## 2022-09-07 RX ORDER — MAGNESIUM SULFATE HEPTAHYDRATE 40 MG/ML
2 INJECTION, SOLUTION INTRAVENOUS AS NEEDED
Status: DISCONTINUED | OUTPATIENT
Start: 2022-09-07 | End: 2022-09-11 | Stop reason: HOSPADM

## 2022-09-07 RX ORDER — DEXTROSE MONOHYDRATE 25 G/50ML
25 INJECTION, SOLUTION INTRAVENOUS
Status: DISCONTINUED | OUTPATIENT
Start: 2022-09-07 | End: 2022-09-11 | Stop reason: HOSPADM

## 2022-09-07 RX ORDER — BENZONATATE 100 MG/1
100 CAPSULE ORAL EVERY 4 HOURS PRN
Status: DISCONTINUED | OUTPATIENT
Start: 2022-09-07 | End: 2022-09-11 | Stop reason: HOSPADM

## 2022-09-07 RX ORDER — ACETAMINOPHEN 500 MG
1000 TABLET ORAL ONCE
Status: COMPLETED | OUTPATIENT
Start: 2022-09-07 | End: 2022-09-07

## 2022-09-07 RX ORDER — LISINOPRIL 2.5 MG/1
5 TABLET ORAL DAILY
Status: CANCELLED | OUTPATIENT
Start: 2022-09-07

## 2022-09-07 RX ORDER — NAPROXEN 250 MG/1
500 TABLET ORAL 2 TIMES DAILY WITH MEALS
Status: CANCELLED | OUTPATIENT
Start: 2022-09-07

## 2022-09-07 RX ORDER — SODIUM CHLORIDE 0.9 % (FLUSH) 0.9 %
10 SYRINGE (ML) INJECTION AS NEEDED
Status: DISCONTINUED | OUTPATIENT
Start: 2022-09-07 | End: 2022-09-11 | Stop reason: HOSPADM

## 2022-09-07 RX ORDER — CHLORHEXIDINE GLUCONATE 0.12 MG/ML
15 RINSE ORAL EVERY 12 HOURS SCHEDULED
Status: DISCONTINUED | OUTPATIENT
Start: 2022-09-07 | End: 2022-09-07

## 2022-09-07 RX ORDER — HEPARIN SODIUM 5000 [USP'U]/ML
5000 INJECTION, SOLUTION INTRAVENOUS; SUBCUTANEOUS EVERY 12 HOURS SCHEDULED
Status: DISCONTINUED | OUTPATIENT
Start: 2022-09-07 | End: 2022-09-08

## 2022-09-07 RX ORDER — SODIUM CHLORIDE 0.9 % (FLUSH) 0.9 %
3-10 SYRINGE (ML) INJECTION AS NEEDED
Status: DISCONTINUED | OUTPATIENT
Start: 2022-09-07 | End: 2022-09-11 | Stop reason: HOSPADM

## 2022-09-07 RX ORDER — SODIUM CHLORIDE 0.9 % (FLUSH) 0.9 %
3 SYRINGE (ML) INJECTION EVERY 12 HOURS SCHEDULED
Status: DISCONTINUED | OUTPATIENT
Start: 2022-09-07 | End: 2022-09-11 | Stop reason: HOSPADM

## 2022-09-07 RX ORDER — DEXAMETHASONE 4 MG/1
4 TABLET ORAL
Status: COMPLETED | OUTPATIENT
Start: 2022-09-07 | End: 2022-09-11

## 2022-09-07 RX ORDER — ACETAMINOPHEN 500 MG
1000 TABLET ORAL EVERY 6 HOURS PRN
Status: DISCONTINUED | OUTPATIENT
Start: 2022-09-07 | End: 2022-09-11 | Stop reason: HOSPADM

## 2022-09-07 RX ORDER — INSULIN ASPART 100 [IU]/ML
0-7 INJECTION, SOLUTION INTRAVENOUS; SUBCUTANEOUS
Status: DISCONTINUED | OUTPATIENT
Start: 2022-09-07 | End: 2022-09-08

## 2022-09-07 RX ORDER — GUAIFENESIN 600 MG/1
600 TABLET, EXTENDED RELEASE ORAL EVERY 12 HOURS SCHEDULED
Status: DISCONTINUED | OUTPATIENT
Start: 2022-09-07 | End: 2022-09-11 | Stop reason: HOSPADM

## 2022-09-07 RX ORDER — NICOTINE POLACRILEX 4 MG
15 LOZENGE BUCCAL
Status: DISCONTINUED | OUTPATIENT
Start: 2022-09-07 | End: 2022-09-11 | Stop reason: HOSPADM

## 2022-09-07 RX ORDER — MAGNESIUM SULFATE HEPTAHYDRATE 40 MG/ML
4 INJECTION, SOLUTION INTRAVENOUS AS NEEDED
Status: DISCONTINUED | OUTPATIENT
Start: 2022-09-07 | End: 2022-09-11 | Stop reason: HOSPADM

## 2022-09-07 RX ORDER — NITROGLYCERIN 0.4 MG/1
0.4 TABLET SUBLINGUAL
Status: DISCONTINUED | OUTPATIENT
Start: 2022-09-07 | End: 2022-09-11 | Stop reason: HOSPADM

## 2022-09-07 RX ADMIN — GUAIFENESIN 600 MG: 600 TABLET, EXTENDED RELEASE ORAL at 08:06

## 2022-09-07 RX ADMIN — MAGNESIUM SULFATE HEPTAHYDRATE 2 G: 40 INJECTION, SOLUTION INTRAVENOUS at 08:08

## 2022-09-07 RX ADMIN — SODIUM CHLORIDE, PRESERVATIVE FREE 3 ML: 5 INJECTION INTRAVENOUS at 08:08

## 2022-09-07 RX ADMIN — DEXAMETHASONE 4 MG: 4 TABLET ORAL at 10:56

## 2022-09-07 RX ADMIN — MAGNESIUM SULFATE HEPTAHYDRATE 2 G: 40 INJECTION, SOLUTION INTRAVENOUS at 13:13

## 2022-09-07 RX ADMIN — MAGNESIUM SULFATE HEPTAHYDRATE 2 G: 40 INJECTION, SOLUTION INTRAVENOUS at 09:40

## 2022-09-07 RX ADMIN — DOXYCYCLINE 100 MG: 100 INJECTION, POWDER, LYOPHILIZED, FOR SOLUTION INTRAVENOUS at 15:33

## 2022-09-07 RX ADMIN — DOXYCYCLINE 100 MG: 100 INJECTION, POWDER, LYOPHILIZED, FOR SOLUTION INTRAVENOUS at 03:15

## 2022-09-07 RX ADMIN — BENZONATATE 100 MG: 100 CAPSULE ORAL at 21:44

## 2022-09-07 RX ADMIN — REMDESIVIR 200 MG: 100 INJECTION, POWDER, LYOPHILIZED, FOR SOLUTION INTRAVENOUS at 09:37

## 2022-09-07 RX ADMIN — ACETAMINOPHEN 1000 MG: 500 TABLET ORAL at 21:44

## 2022-09-07 RX ADMIN — ACETAMINOPHEN 1000 MG: 500 TABLET ORAL at 03:05

## 2022-09-07 RX ADMIN — SODIUM CHLORIDE 1000 ML: 9 INJECTION, SOLUTION INTRAVENOUS at 03:05

## 2022-09-07 RX ADMIN — HEPARIN SODIUM 5000 UNITS: 5000 INJECTION INTRAVENOUS; SUBCUTANEOUS at 08:06

## 2022-09-07 RX ADMIN — PHENOL 1 SPRAY: 1.4 SPRAY ORAL at 10:55

## 2022-09-07 RX ADMIN — PHENOL 1 SPRAY: 1.4 SPRAY ORAL at 15:32

## 2022-09-07 RX ADMIN — IOPAMIDOL 80 ML: 755 INJECTION, SOLUTION INTRAVENOUS at 04:02

## 2022-09-07 RX ADMIN — GUAIFENESIN 600 MG: 600 TABLET, EXTENDED RELEASE ORAL at 21:44

## 2022-09-07 RX ADMIN — INSULIN ASPART 3 UNITS: 100 INJECTION, SOLUTION INTRAVENOUS; SUBCUTANEOUS at 16:52

## 2022-09-07 RX ADMIN — PHENOL 1 SPRAY: 1.4 SPRAY ORAL at 13:13

## 2022-09-07 RX ADMIN — SODIUM CHLORIDE, PRESERVATIVE FREE 3 ML: 5 INJECTION INTRAVENOUS at 21:44

## 2022-09-07 RX ADMIN — CEFTRIAXONE 1 G: 1 INJECTION, POWDER, FOR SOLUTION INTRAMUSCULAR; INTRAVENOUS at 05:15

## 2022-09-07 RX ADMIN — ACETAMINOPHEN 1000 MG: 500 TABLET ORAL at 10:56

## 2022-09-07 RX ADMIN — BENZONATATE 100 MG: 100 CAPSULE ORAL at 10:56

## 2022-09-07 RX ADMIN — HEPARIN SODIUM 5000 UNITS: 5000 INJECTION INTRAVENOUS; SUBCUTANEOUS at 21:44

## 2022-09-07 NOTE — PAYOR COMM NOTE
"  [facility]  NPI: [id number]    Utilization Review   Contact:Kesha Salamanca MSN, APRN, NP-C  Phone: [phone]  Fax: [phone]      Inpatient Auth Req  REF: [id number]  Evan Saalzar (67 y.o. Male)             Date of Birth   1955    Social Security Number       Address   [address] [address]    Home Phone   [phone]    MRN   [id number]       [place]   Adventism    Marital Status                               Admission Date   9/7/22    Admission Type   Emergency    Admitting Provider   Luca Dominguez MD    Attending Provider   Nicolas Stein MD    Department, Room/Bed   [facility] PROGRESS CARE, P205/1P       Discharge Date       Discharge Disposition       Discharge Destination                               Attending Provider: Nicolas Stein MD    Allergies: Bactrim [Sulfamethoxazole-trimethoprim]    Isolation: Enh Drop/Con   Infection: COVID (confirmed) (09/07/22)   Code Status: CPR   Advance Care Planning Activity    Ht: 175.3 cm (69\")   Wt: 76.6 kg (168 lb 14 oz)    Admission Cmt: None   Principal Problem: Pneumonia due to COVID-19 virus [U07.1,J12.82]                 Active Insurance as of 9/7/2022     Primary Coverage     Payor Plan Insurance Group Employer/Plan Group    [employer] [facility] [employer] [facility] PPO [id number]     Payor Plan Address Payor Plan Phone Number Payor Plan Fax Number Effective Dates    [id number] [phone]  7/1/2014 - None Entered    [place] [address]       Subscriber Name Subscriber Birth Date Member ID       NAYE SALAZAR 12/11/1956 [id number]                 Emergency Contacts      (Rel.) Home Phone Work Phone Mobile Phone    Naye Salazar (Spouse) [phone] -- [phone]               History & Physical      Jessika Cisneros PA-C at 09/07/22 0450     Attestation signed by Luca Dominguez MD at 09/07/22 0708    I have reviewed this documentation and " agree.  I have also discussed and formulated the assessment and plan with PRAVIN Rosen.                      [facility] Medicine Services  History & Physical    Patient Identification:  Name:  Evan Salazar  Age:  67 y.o.  Sex:  male  :  1955  MRN:  [id number]   Visit Number:  [id number]  Primary Care Physician:  Yenny Astorga, APRN    Subjective     2022   Chief complaint:   Chief Complaint   Patient presents with   • Shortness of Breath     Pt states he test postivie for covid Saturday and his having all the covid symptoms and is soa. Pt states his O2 level at home was 89%     History of presenting illness:    Evan Salazar is a 67 y.o. male with past medical history significant for GERD, type II diabetes mellitus, essential hypertension, and history of kidney stones who presents to [facility] Emergency Department with complaints of shortness of breath.     Patient reports that he tested positive on Monday () for COVID-19 on an at-home test which is also when he started having symptoms of a sore throat. He states that since that time he has developed body aches, chills, subjective fever, congestion, and a productive cough with yellow sputum. He reports that his oxygen saturations dropped to 87% at home and reports that he doesn't have any home oxygen requirements. He denies any shortness of breath. He reports that he had COVID-19 in 2021 and received antibody infusions in the ED, but reports at that time he felt his symptoms were milder. He does admit to nausea and generalized weakness. He denies any chest pain, abdominal pain, vomiting, diarrhea, or urinary symptoms. He denies any known sick contacts. He has not been vaccinated for COVID-19.     Upon arrival to the ED, vital signs were temperature 98, heart rate 107, respirations 12, /55, SpO2 93% on room air. ABG with pH 7.411, pCO2 37.4, pO2 61.3, HCO3 23.8, oxygen saturation 92.9 on  room air. CMP with glucose 184, creatinine 1.58, BUN 24, eGFR 47.9, otherwise within normal limits. CBC with WBC 10.84, RBC 4.13, hemoglobin 12.7, hematocrit 37.3, platelets 135, otherwise within normal limits. Hemoglobin A1c 6.8. Lactate 2.2. Magnesium 1.4. Procalcitonin 6.43. Chest XR with patchy infiltrates in the right lung and at the left base concerning for pneumonia presumably from COVID based on history. CT PE protocol with multifocal patchy areas of groundglass infiltrate, R > L, compatible with pneumonia; atelectasis of right middle and right lower lobes due to elevated hemidiaphragm and mild atelectasis of left lower lobe.     Known Emergency Department medications received prior to my evaluation included IV Rocephin, IV Doxycycline, and 1 L fluid bolus.     Patient will be admitted to the PCU as medical telemetry overflow for further evaluation and treatment.     ---------------------------------------------------------------------------------------------------------------------   Review of Systems   Constitutional: Positive for chills and fever (subjective). Negative for activity change.   HENT: Positive for congestion. Negative for rhinorrhea.    Eyes: Negative for discharge and redness.   Respiratory: Positive for cough (productive with yellow sputum). Negative for apnea, shortness of breath and wheezing.    Cardiovascular: Negative for chest pain and palpitations.   Gastrointestinal: Positive for nausea. Negative for abdominal distention, abdominal pain, diarrhea and vomiting.   Genitourinary: Negative for difficulty urinating, dysuria, frequency and urgency.   Musculoskeletal: Positive for myalgias. Negative for arthralgias.   Skin: Negative for color change and wound.   Neurological: Positive for weakness (generalized). Negative for dizziness.   Psychiatric/Behavioral: Negative for agitation, behavioral problems and confusion.      ---------------------------------------------------------------------------------------------------------------------   Past Medical History:   Diagnosis Date   • Anxiety    • Arthritis    • COVID-19 09/02/2021    Received Casirivimab,Imdevimab in the ED; not hospitalized   • Epididymitis    • GERD (gastroesophageal reflux disease)    • Heartburn    • Hydrocele    • Kidney stone    • Type 2 diabetes mellitus (HCC)      Past Surgical History:   Procedure Laterality Date   • COLONOSCOPY     • CYSTOSCOPY W/ URETEROSCOPY W/ LITHOTRIPSY     • FINGER SURGERY     • HERNIA REPAIR     • SCROTAL EXPLORATION Left 6/28/2018    Procedure: SCROTAL EXPLORATION/ INGUINAL EXPLORATION HEMIORCHICETOMY WITH FROZEN SECTION.;  Surgeon: Edvin Mars MD;  Location: [facility];  Service: Urology     Family History   Problem Relation Age of Onset   • No Known Problems Father    • No Known Problems Mother      Social History     Socioeconomic History   • Marital status:    Tobacco Use   • Smoking status: Never Smoker   • Smokeless tobacco: Never Used   Vaping Use   • Vaping Use: Never used   Substance and Sexual Activity   • Alcohol use: No   • Drug use: No   • Sexual activity: Yes     Partners: Female     ---------------------------------------------------------------------------------------------------------------------   Allergies:  Bactrim [sulfamethoxazole-trimethoprim]  ---------------------------------------------------------------------------------------------------------------------   Home medications:    Medications below are reported home medications pulling from within the system; at this time, these medications have not been reconciled unless otherwise specified and are in the verification process for further verifcation as current home medications.  (Not in a hospital admission)      Hospital Scheduled Meds:  cefTRIAXone, 1 g, Intravenous, Once           Current listed hospital scheduled medications may not  yet reflect those currently placed in orders that are signed and held awaiting patient's arrival to floor.   ---------------------------------------------------------------------------------------------------------------------     Objective     Vital Signs:  Temp:  [98 °F (36.7 °C)] 98 °F (36.7 °C)  Heart Rate:  [] 81  Resp:  [12] 12  BP: ()/(55-64) 103/64      09/06/22  2226   Weight: 74.8 kg (165 lb)     Body mass index is 24.37 kg/m².  ---------------------------------------------------------------------------------------------------------------------       Physical Exam  Constitutional:       General: He is awake.      Appearance: He is normal weight. He is ill-appearing (acutely).      Interventions: Nasal cannula in place.      Comments: Resting comfortably in bed upon arrival. Appears in no acute distress. Nasal cannula in place.    HENT:      Head: Normocephalic and atraumatic.      Right Ear: External ear normal.      Left Ear: External ear normal.      Nose: Nose normal.      Mouth/Throat:      Mouth: Mucous membranes are dry.      Pharynx: Oropharynx is clear.   Eyes:      Extraocular Movements: Extraocular movements intact.      Conjunctiva/sclera: Conjunctivae normal.      Pupils: Pupils are equal, round, and reactive to light.   Cardiovascular:      Rate and Rhythm: Normal rate and regular rhythm.      Pulses: Normal pulses.           Dorsalis pedis pulses are 2+ on the right side and 2+ on the left side.        Posterior tibial pulses are 2+ on the right side and 2+ on the left side.      Heart sounds: Normal heart sounds. No murmur heard.    No friction rub. No gallop.   Pulmonary:      Effort: Pulmonary effort is normal. No respiratory distress.      Breath sounds: Normal breath sounds. No decreased breath sounds, wheezing, rhonchi or rales.      Comments: Currently on 2 L oxygen with saturations 96%.   Abdominal:      General: Abdomen is flat. Bowel sounds are normal. There is no  distension.      Palpations: Abdomen is soft.      Tenderness: There is no abdominal tenderness. There is no guarding.   Musculoskeletal:         General: No swelling. Normal range of motion.      Cervical back: Normal range of motion and neck supple.      Right lower leg: No edema.      Left lower leg: No edema.   Skin:     General: Skin is warm and dry.      Findings: No erythema or rash.   Neurological:      General: No focal deficit present.      Mental Status: He is alert and oriented to person, place, and time. Mental status is at baseline.   Psychiatric:         Mood and Affect: Mood normal.         Behavior: Behavior normal. Behavior is cooperative.         Thought Content: Thought content normal.       ---------------------------------------------------------------------------------------------------------------------  EKG:        I have personally looked at both the EKG and the telemetry strips.  ---------------------------------------------------------------------------------------------------------------------   Results from last 7 days   Lab Units 09/07/22  0201   LACTATE mmol/L 2.2*   WBC 10*3/mm3 10.84*   HEMOGLOBIN g/dL 12.7*   HEMATOCRIT % 37.3*   MCV fL 90.3   MCHC g/dL 34.0   PLATELETS 10*3/mm3 135*     Results from last 7 days   Lab Units 09/07/22  0216   PH, ARTERIAL pH units 7.411   PO2 ART mm Hg 61.3*   PCO2, ARTERIAL mm Hg 37.4   HCO3 ART mmol/L 23.8     Results from last 7 days   Lab Units 09/07/22  0201   SODIUM mmol/L 136   POTASSIUM mmol/L 5.0   CHLORIDE mmol/L 99   CO2 mmol/L 23.4   BUN mg/dL 24*   CREATININE mg/dL 1.58*   CALCIUM mg/dL 9.1   GLUCOSE mg/dL 184*   ALBUMIN g/dL 4.33   BILIRUBIN mg/dL 1.0   ALK PHOS U/L 78   AST (SGOT) U/L 18   ALT (SGPT) U/L 28   Estimated Creatinine Clearance: 48.7 mL/min (A) (by C-G formula based on SCr of 1.58 mg/dL (H)).  No results found for: AMMONIA  Results from last 7 days   Lab Units 09/07/22  0201   TROPONIN T ng/mL <0.010     Results from last 7  days   Lab Units 09/07/22  0201   PROBNP pg/mL 1,730.0*     Lab Results   Component Value Date    HGBA1C 11.20 (H) 09/17/2021     Lab Results   Component Value Date    TSH 1.331 09/01/2018    FREET4 1.22 09/01/2018     No results found for: PREGTESTUR, PREGSERUM, HCG, HCGQUANT  Pain Management Panel    There is no flowsheet data to display.           ---------------------------------------------------------------------------------------------------------------------  Imaging Results (Last 7 Days)     Procedure Component Value Units Date/Time    CT Angiogram Chest Pulmonary Embolism [[id number]] Collected: 09/07/22 0414     Updated: 09/07/22 0416    Narrative:      CT CHEST PULMONARY EMBOLISM W CONTRAST    INDICATION:   Productive cough. Covid positive.    TECHNIQUE:   CT angiogram of the chest with IV contrast. 3-D reconstructions were obtained and reviewed.   Radiation dose reduction techniques included automated exposure control or exposure modulation based on body size. Count of known CT and cardiac nuc med studies  performed in previous 12 months: 1.     COMPARISON:   None available.    FINDINGS:   There is no pulmonary embolism or aortic dissection. There is no pleural or pericardial effusion. There is no adenopathy. Upper abdominal images show gallstones within an otherwise normal-appearing gallbladder. There is elevation of the right  hemidiaphragm.    Lung windows demonstrate multifocal patchy areas of groundglass infiltrate, right greater than left, compatible with pneumonia. Commonly reported imaging features of COVID-19 pneumonia are present. Other processes such as influenza pneumonia and  organizing pneumonia can cause a similar imaging pattern. There is atelectasis in the right middle and right lower lobes due to the elevated hemidiaphragm. There is also some mild atelectasis in the left lower lobe. There are old mid thoracic compression  deformities with kyphosis.      Impression:        1. No PE or  aortic dissection.  2. Multifocal pneumonia typical of Covid 19.  3. Atelectasis in the lower lobes and right middle lobe, right greater than left. There is elevation of the right hemidiaphragm.    Signer Name: Tylor Panda MD   Signed: 9/7/2022 4:14 AM   Workstation Name: [email or web address]    [facility]    XR Chest AP [[id number]] Collected: 09/07/22 0252     Updated: 09/07/22 0254    Narrative:      CR Chest 1 Vw    INDICATION:   Dyspnea. Recent positive Covid test.     COMPARISON:    9/17/2021    FINDINGS:  Portable AP view(s) of the chest.  Lung volumes remain low, and there is chronic elevation of the right hemidiaphragm. There are some patchy infiltrates in the right lung and at the left base compatible with pneumonia, presumably related to Covid. No  pneumothorax. Heart size is mildly enlarged.      Impression:      Patchy infiltrates in the right lung and at the left base concerning for pneumonia, presumably from Covid based on history.    Signer Name: Tylor Panda MD   Signed: 9/7/2022 2:52 AM   Workstation [facility]: [facility]    [facility]            I have personally reviewed the above radiology images and read the final radiology report on 09/07/22  ---------------------------------------------------------------------------------------------------------------------  Assessment / Plan     Active Hospital Problems    Diagnosis  POA   • Pneumonia due to COVID-19 virus [U07.1, J12.82]  Yes       ASSESSMENT/PLAN:  -COVID-19 pneumonia and possible superimposed bacterial pneumonia of RLL, POA  -Severe sepsis 2/2 above with HR >90, lactate 2.2, and respiratory failure, POA  -Acute hypoxic respiratory failure 2/2 above, POA  -History of COVID-19 09/2021  -CT PE protocol with multifocal pneumonia typical of COVID-19 and atelectasis of lower lobes and right middle lobe, R>L, and negative for PE; reviewed with attending, Dr. Dominguez, with consolidation of right lower lobe  and patchy infiltrates noted throughout bilateral lungs.   -Procalcitonin 6.43; received IV Rocephin and IV Doxycycline in ED; will continue on admission.   -Continue IV Remdesivir x 5 days as patient symptom onset <5 days ago and currently hypoxic.   -Received 1 L fluid bolus in ED; will hold off on further fluids for now to ensure lungs are dry in setting of pneumonia.   -Continue Mucinex twice daily.   -Continuous pulse oximetry; titrate to maintain SpO2 90-95%.   -Encourage incentive spirometer.   -Place in enhanced droplet/contact isolation.   -Trend COVID-19 progression labs.   -Trend lactate until normalized.   -Repeat a.m. CBC and C-RP.     -Acute kidney injury, POA  -History of kidney stones  -Suspect ATN from sepsis.   -Received 1 L fluid bolus in ED; holding off on further fluids for now as noted above.   -Obtain bilateral renal ultrasound.   -Avoid nephrotoxins as able.   -Monitor urine output closely.   -Repeat a.m. CMP.     -Acute thrombocytopenia  -Likely secondary to COVID-19.   -Platelets 135; previously 168 on 08/17.  -No acute bleeding noted.   -Repeat a.m. CBC.     -Elevated proBNP  -Hypoxia possibly contributing.   -No previous echo for review; no history of heart failure.   -Appears euvolemic on exam.  -Obtain TTE.     -Acute hypomagnesemia (1.4)  -Replace per protocol.     -Essential hypertension  -BP borderline with SBP 's since arrival.   -Monitor per hospital protocol.   -May hold antihypertensives in setting of sepsis until BP improves.     -GERD  -PPI.    -Generalized weakness  -PT/OT/SLP consults ordered.   -Up with assistance; fall precautions.   ----------  -DVT prophylaxis: SQ Heparin   -Activity: Up with assistance; fall precautions  -Expected length of stay:   INPATIENT status due to the need for care which can only be reasonably provided in an hospital setting such as aggressive/expedited ancillary services and/or consultation services, the necessity for IV medications, close  physician monitoring and/or the possible need for procedures.  In such, I feel patient's risk for adverse outcomes and need for care warrant INPATIENT evaluation and predict the patient's care encounter to likely last beyond 2 midnights.    CODE STATUS: Full    High risk secondary to severe sepsis, acute hypoxic respiratory failure, COVID-19 with superimposed RLL bacterial pneumonia, JENISE     Disposition: Pending clinical course; likely home once medically stable    Jessika Cisneros PA-C  09/07/22  04:51 EDT    ---------------------------------------------------------------------------------------------------------------------           Electronically signed by Luca Dominguez MD at 09/07/22 0708          Emergency Department Notes      Mikey Tenorio II, PA at 09/07/22 0436     Attestation signed by Melissa Garcia MD at 09/07/22 0510    .I have reviewed the notes, assessments, and/or procedures performed by Mikey Tenorio II, I concur with her/his documentation of Evan Salazar.     SUPERVISE: For this patient encounter, I reviewed the APC's documentation, treatment plan, and medical decision making.  Farshad Garcia MD 9/7/2022 05:10 EDT                         Subjective   PIT      History provided by:  Patient  Shortness of Breath  Severity:  Moderate  Onset quality:  Gradual  Duration:  2 days  Timing:  Constant  Progression:  Worsening  Chronicity:  New  Context: URI (covid +, tested monday 9/5)    Relieved by:  Nothing  Associated symptoms: cough and fever    Associated symptoms: no abdominal pain and no chest pain        Review of Systems   Constitutional: Positive for fever.   HENT: Negative.    Respiratory: Positive for cough and shortness of breath.    Cardiovascular: Negative.  Negative for chest pain.   Gastrointestinal: Negative.  Negative for abdominal pain.   Endocrine: Negative.    Genitourinary: Negative.  Negative for dysuria.   Skin: Negative.    Neurological: Negative.     Psychiatric/Behavioral: Negative.    All other systems reviewed and are negative.      Past Medical History:   Diagnosis Date   • Anxiety    • Arthritis    • COVID-19 09/02/2021    Received Casirivimab,Imdevimab in the ED; not hospitalized   • Epididymitis    • GERD (gastroesophageal reflux disease)    • Heartburn    • Hydrocele    • Kidney stone    • Type 2 diabetes mellitus (HCC)        Allergies   Allergen Reactions   • Bactrim [Sulfamethoxazole-Trimethoprim] Rash and Other (See Comments)     Flu like symptoms       Past Surgical History:   Procedure Laterality Date   • COLONOSCOPY     • CYSTOSCOPY W/ URETEROSCOPY W/ LITHOTRIPSY     • FINGER SURGERY     • HERNIA REPAIR     • SCROTAL EXPLORATION Left 6/28/2018    Procedure: SCROTAL EXPLORATION/ INGUINAL EXPLORATION HEMIORCHICETOMY WITH FROZEN SECTION.;  Surgeon: Edvin Mars MD;  Location: [facility];  Service: Urology       Family History   Problem Relation Age of Onset   • No Known Problems Father    • No Known Problems Mother        Social History     Socioeconomic History   • Marital status:    Tobacco Use   • Smoking status: Never Smoker   • Smokeless tobacco: Never Used   Vaping Use   • Vaping Use: Never used   Substance and Sexual Activity   • Alcohol use: No   • Drug use: No   • Sexual activity: Yes     Partners: Female           Objective   Physical Exam  Vitals and nursing note reviewed.   Constitutional:       General: He is not in acute distress.     Appearance: He is well-developed. He is ill-appearing. He is not diaphoretic.   HENT:      Head: Normocephalic and atraumatic.      Right Ear: External ear normal.      Left Ear: External ear normal.      Nose: Nose normal.   Eyes:      Conjunctiva/sclera: Conjunctivae normal.   Neck:      Vascular: No JVD.      Trachea: No tracheal deviation.   Cardiovascular:      Rate and Rhythm: Normal rate and regular rhythm.      Heart sounds: Normal heart sounds. No murmur heard.  Pulmonary:       Effort: Pulmonary effort is normal. No respiratory distress.      Breath sounds: Examination of the right-upper field reveals rhonchi. Examination of the left-upper field reveals rhonchi. Rhonchi present. No wheezing.   Abdominal:      General: Bowel sounds are normal.      Palpations: Abdomen is soft.      Tenderness: There is no abdominal tenderness.   Musculoskeletal:         General: No deformity. Normal range of motion.      Cervical back: Normal range of motion and neck supple.   Skin:     General: Skin is warm and dry.      Coloration: Skin is not pale.      Findings: No erythema or rash.   Neurological:      Mental Status: He is alert and oriented to person, place, and time.      Cranial Nerves: No cranial nerve deficit.   Psychiatric:         Behavior: Behavior normal.         Thought Content: Thought content normal.         Procedures          ED Course  ED Course as of 09/07/22 0444   Wed Sep 07, 2022   0024 ECG 22:24 Sinus tachycardia, rate 112. QT/qTc 298/406 [GIA]   0259 XR chest rad interpreted:  Patchy infiltrates in the right lung and at the left base concerning for pneumonia, presumably from Covid based on history. [RB]   0422 CTA pe protocol rad interpreted:  . No PE or aortic dissection.  2. Multifocal pneumonia typical of Covid 19.  3. Atelectasis in the lower lobes and right middle lobe, right greater than left. There is elevation of the right hemidiaphragm.    [RB]   0442 Discussed with Dr. Dominguez who is agreeable to admit this patient.  [RB]      ED Course User Index  [GIA] Espinoza Morley MD  [RB] Mikey Tenorio II, PA                                           MDM  Number of Diagnoses or Management Options  Pneumonia due to COVID-19 virus: new and requires workup     Amount and/or Complexity of Data Reviewed  Clinical lab tests: ordered and reviewed  Tests in the radiology section of CPT®: ordered and reviewed  Decide to obtain previous medical records or to obtain history from someone other  than the patient: yes  Discuss the patient with other providers: yes    Risk of Complications, Morbidity, and/or Mortality  Presenting problems: moderate  Diagnostic procedures: moderate  Management options: moderate    Patient Progress  Patient progress: stable      Final diagnoses:   Pneumonia due to COVID-19 virus       ED Disposition  ED Disposition     ED Disposition   Decision to Admit    Condition   --    Comment   --             No follow-up provider specified.       Medication List      No changes were made to your prescriptions during this visit.          iMkey Tenorio II, PA  09/07/22 0444      Electronically signed by Melissa Garcia MD at 09/07/22 0510     Ruthy Mendoza, RN at 09/07/22 0525        SPUTUM SAMPLE COLLECTED AND SENT. PT UPDATED ON ADMISSION STATUS    Electronically signed by Ruthy Mendoza RN at 09/07/22 0525     Ruthy Mendoza RN at 09/07/22 0549        URINE AND LACTIC COLLECTED AND SENT. 20G RIGHT HAND INSERTED, PATENT, AND SALINE LOCKED    Electronically signed by Ruthy Mendoza RN at 09/07/22 0573

## 2022-09-07 NOTE — H&P
[facility]  History & Physical    Patient Identification:  Name:  Evan Salazar  Age:  67 y.o.  Sex:  male  :  1955  MRN:  [id number]   Visit Number:  [id number]  Primary Care Physician:  Yenny Astorga APRN    Subjective     2022   Chief complaint:   Chief Complaint   Patient presents with   • Shortness of Breath     Pt states he test postivie for covid Saturday and his having all the covid symptoms and is soa. Pt states his O2 level at home was 89%     History of presenting illness:    Evan Salazar is a 67 y.o. male with past medical history significant for GERD, type II diabetes mellitus, essential hypertension, and history of kidney stones who presents to [facility] Emergency Department with complaints of shortness of breath.     Patient reports that he tested positive on Monday () for COVID-19 on an at-home test which is also when he started having symptoms of a sore throat. He states that since that time he has developed body aches, chills, subjective fever, congestion, and a productive cough with yellow sputum. He reports that his oxygen saturations dropped to 87% at home and reports that he doesn't have any home oxygen requirements. He denies any shortness of breath. He reports that he had COVID-19 in 2021 and received antibody infusions in the ED, but reports at that time he felt his symptoms were milder. He does admit to nausea and generalized weakness. He denies any chest pain, abdominal pain, vomiting, diarrhea, or urinary symptoms. He denies any known sick contacts. He has not been vaccinated for COVID-19.     Upon arrival to the ED, vital signs were temperature 98, heart rate 107, respirations 12, /55, SpO2 93% on room air. ABG with pH 7.411, pCO2 37.4, pO2 61.3, HCO3 23.8, oxygen saturation 92.9 on room air. CMP with glucose 184, creatinine 1.58, BUN 24, eGFR 47.9, otherwise within normal limits. CBC with  WBC 10.84, RBC 4.13, hemoglobin 12.7, hematocrit 37.3, platelets 135, otherwise within normal limits. Hemoglobin A1c 6.8. Lactate 2.2. Magnesium 1.4. Procalcitonin 6.43. Chest XR with patchy infiltrates in the right lung and at the left base concerning for pneumonia presumably from COVID based on history. CT PE protocol with multifocal patchy areas of groundglass infiltrate, R > L, compatible with pneumonia; atelectasis of right middle and right lower lobes due to elevated hemidiaphragm and mild atelectasis of left lower lobe.     Known Emergency Department medications received prior to my evaluation included IV Rocephin, IV Doxycycline, and 1 L fluid bolus.     Patient will be admitted to the PCU as medical telemetry overflow for further evaluation and treatment.     ---------------------------------------------------------------------------------------------------------------------   Review of Systems   Constitutional: Positive for chills and fever (subjective). Negative for activity change.   HENT: Positive for congestion. Negative for rhinorrhea.    Eyes: Negative for discharge and redness.   Respiratory: Positive for cough (productive with yellow sputum). Negative for apnea, shortness of breath and wheezing.    Cardiovascular: Negative for chest pain and palpitations.   Gastrointestinal: Positive for nausea. Negative for abdominal distention, abdominal pain, diarrhea and vomiting.   Genitourinary: Negative for difficulty urinating, dysuria, frequency and urgency.   Musculoskeletal: Positive for myalgias. Negative for arthralgias.   Skin: Negative for color change and wound.   Neurological: Positive for weakness (generalized). Negative for dizziness.   Psychiatric/Behavioral: Negative for agitation, behavioral problems and confusion.     ---------------------------------------------------------------------------------------------------------------------   Past Medical History:   Diagnosis Date   • Anxiety    •  Arthritis    • COVID-19 09/02/2021    Received Casirivimab,Imdevimab in the ED; not hospitalized   • Epididymitis    • GERD (gastroesophageal reflux disease)    • Heartburn    • Hydrocele    • Kidney stone    • Type 2 diabetes mellitus (HCC)      Past Surgical History:   Procedure Laterality Date   • COLONOSCOPY     • CYSTOSCOPY W/ URETEROSCOPY W/ LITHOTRIPSY     • FINGER SURGERY     • HERNIA REPAIR     • SCROTAL EXPLORATION Left 6/28/2018    Procedure: SCROTAL EXPLORATION/ INGUINAL EXPLORATION HEMIORCHICETOMY WITH FROZEN SECTION.;  Surgeon: Edvin Mars MD;  Location: [facility];  Service: Urology     Family History   Problem Relation Age of Onset   • No Known Problems Father    • No Known Problems Mother      Social History     Socioeconomic History   • Marital status:    Tobacco Use   • Smoking status: Never Smoker   • Smokeless tobacco: Never Used   Vaping Use   • Vaping Use: Never used   Substance and Sexual Activity   • Alcohol use: No   • Drug use: No   • Sexual activity: Yes     Partners: Female     ---------------------------------------------------------------------------------------------------------------------   Allergies:  Bactrim [sulfamethoxazole-trimethoprim]  ---------------------------------------------------------------------------------------------------------------------   Home medications:    Medications below are reported home medications pulling from within the system; at this time, these medications have not been reconciled unless otherwise specified and are in the verification process for further verifcation as current home medications.  (Not in a hospital admission)      Hospital Scheduled Meds:  cefTRIAXone, 1 g, Intravenous, Once           Current listed hospital scheduled medications may not yet reflect those currently placed in orders that are signed and held awaiting patient's arrival to floor.    ---------------------------------------------------------------------------------------------------------------------     Objective     Vital Signs:  Temp:  [98 °F (36.7 °C)] 98 °F (36.7 °C)  Heart Rate:  [] 81  Resp:  [12] 12  BP: ()/(55-64) 103/64      09/06/22  2226   Weight: 74.8 kg (165 lb)     Body mass index is 24.37 kg/m².  ---------------------------------------------------------------------------------------------------------------------       Physical Exam  Constitutional:       General: He is awake.      Appearance: He is normal weight. He is ill-appearing (acutely).      Interventions: Nasal cannula in place.      Comments: Resting comfortably in bed upon arrival. Appears in no acute distress. Nasal cannula in place.    HENT:      Head: Normocephalic and atraumatic.      Right Ear: External ear normal.      Left Ear: External ear normal.      Nose: Nose normal.      Mouth/Throat:      Mouth: Mucous membranes are dry.      Pharynx: Oropharynx is clear.   Eyes:      Extraocular Movements: Extraocular movements intact.      Conjunctiva/sclera: Conjunctivae normal.      Pupils: Pupils are equal, round, and reactive to light.   Cardiovascular:      Rate and Rhythm: Normal rate and regular rhythm.      Pulses: Normal pulses.           Dorsalis pedis pulses are 2+ on the right side and 2+ on the left side.        Posterior tibial pulses are 2+ on the right side and 2+ on the left side.      Heart sounds: Normal heart sounds. No murmur heard.    No friction rub. No gallop.   Pulmonary:      Effort: Pulmonary effort is normal. No respiratory distress.      Breath sounds: Normal breath sounds. No decreased breath sounds, wheezing, rhonchi or rales.      Comments: Currently on 2 L oxygen with saturations 96%.   Abdominal:      General: Abdomen is flat. Bowel sounds are normal. There is no distension.      Palpations: Abdomen is soft.      Tenderness: There is no abdominal tenderness. There is no  guarding.   Musculoskeletal:         General: No swelling. Normal range of motion.      Cervical back: Normal range of motion and neck supple.      Right lower leg: No edema.      Left lower leg: No edema.   Skin:     General: Skin is warm and dry.      Findings: No erythema or rash.   Neurological:      General: No focal deficit present.      Mental Status: He is alert and oriented to person, place, and time. Mental status is at baseline.   Psychiatric:         Mood and Affect: Mood normal.         Behavior: Behavior normal. Behavior is cooperative.         Thought Content: Thought content normal.       ---------------------------------------------------------------------------------------------------------------------  EKG:        I have personally looked at both the EKG and the telemetry strips.  ---------------------------------------------------------------------------------------------------------------------   Results from last 7 days   Lab Units 09/07/22  0201   LACTATE mmol/L 2.2*   WBC 10*3/mm3 10.84*   HEMOGLOBIN g/dL 12.7*   HEMATOCRIT % 37.3*   MCV fL 90.3   MCHC g/dL 34.0   PLATELETS 10*3/mm3 135*     Results from last 7 days   Lab Units 09/07/22  0216   PH, ARTERIAL pH units 7.411   PO2 ART mm Hg 61.3*   PCO2, ARTERIAL mm Hg 37.4   HCO3 ART mmol/L 23.8     Results from last 7 days   Lab Units 09/07/22  0201   SODIUM mmol/L 136   POTASSIUM mmol/L 5.0   CHLORIDE mmol/L 99   CO2 mmol/L 23.4   BUN mg/dL 24*   CREATININE mg/dL 1.58*   CALCIUM mg/dL 9.1   GLUCOSE mg/dL 184*   ALBUMIN g/dL 4.33   BILIRUBIN mg/dL 1.0   ALK PHOS U/L 78   AST (SGOT) U/L 18   ALT (SGPT) U/L 28   Estimated Creatinine Clearance: 48.7 mL/min (A) (by C-G formula based on SCr of 1.58 mg/dL (H)).  No results found for: AMMONIA  Results from last 7 days   Lab Units 09/07/22  0201   TROPONIN T ng/mL <0.010     Results from last 7 days   Lab Units 09/07/22  0201   PROBNP pg/mL 1,730.0*     Lab Results   Component Value Date    HGBA1C  11.20 (H) 09/17/2021     Lab Results   Component Value Date    TSH 1.331 09/01/2018    FREET4 1.22 09/01/2018     No results found for: PREGTESTUR, PREGSERUM, HCG, HCGQUANT  Pain Management Panel    There is no flowsheet data to display.           ---------------------------------------------------------------------------------------------------------------------  Imaging Results (Last 7 Days)     Procedure Component Value Units Date/Time    CT Angiogram Chest Pulmonary Embolism [[id number]] Collected: 09/07/22 0414     Updated: 09/07/22 0416    Narrative:      CT CHEST PULMONARY EMBOLISM W CONTRAST    INDICATION:   Productive cough. Covid positive.    TECHNIQUE:   CT angiogram of the chest with IV contrast. 3-D reconstructions were obtained and reviewed.   Radiation dose reduction techniques included automated exposure control or exposure modulation based on body size. Count of known CT and cardiac nuc med studies  performed in previous 12 months: 1.     COMPARISON:   None available.    FINDINGS:   There is no pulmonary embolism or aortic dissection. There is no pleural or pericardial effusion. There is no adenopathy. Upper abdominal images show gallstones within an otherwise normal-appearing gallbladder. There is elevation of the right  hemidiaphragm.    Lung windows demonstrate multifocal patchy areas of groundglass infiltrate, right greater than left, compatible with pneumonia. Commonly reported imaging features of COVID-19 pneumonia are present. Other processes such as influenza pneumonia and  organizing pneumonia can cause a similar imaging pattern. There is atelectasis in the right middle and right lower lobes due to the elevated hemidiaphragm. There is also some mild atelectasis in the left lower lobe. There are old mid thoracic compression  deformities with kyphosis.      Impression:        1. No PE or aortic dissection.  2. Multifocal pneumonia typical of Covid 19.  3. Atelectasis in the lower lobes and  right middle lobe, right greater than left. There is elevation of the right hemidiaphragm.    Signer Name: Tylor Panda MD   Signed: 9/7/2022 4:14 AM   Workstation Name: [facility]    [facility]    XR Chest AP [[id number]] Collected: 09/07/22 0252     Updated: 09/07/22 0254    Narrative:      CR Chest 1 Vw    INDICATION:   Dyspnea. Recent positive Covid test.     COMPARISON:    9/17/2021    FINDINGS:  Portable AP view(s) of the chest.  Lung volumes remain low, and there is chronic elevation of the right hemidiaphragm. There are some patchy infiltrates in the right lung and at the left base compatible with pneumonia, presumably related to Covid. No  pneumothorax. Heart size is mildly enlarged.      Impression:      Patchy infiltrates in the right lung and at the left base concerning for pneumonia, presumably from Covid based on history.    Signer Name: Tylor Panda MD   Signed: 9/7/2022 2:52 AM   Workstation Name: [facility]    [facility]            I have personally reviewed the above radiology images and read the final radiology report on 09/07/22  ---------------------------------------------------------------------------------------------------------------------  Assessment / Plan     Active Hospital Problems    Diagnosis  POA   • Pneumonia due to COVID-19 virus [U07.1, J12.82]  Yes       ASSESSMENT/PLAN:  -COVID-19 pneumonia and possible superimposed bacterial pneumonia of RLL, POA  -Severe sepsis 2/2 above with HR >90, lactate 2.2, and respiratory failure, POA  -Acute hypoxic respiratory failure 2/2 above, POA  -History of COVID-19 09/2021  -CT PE protocol with multifocal pneumonia typical of COVID-19 and atelectasis of lower lobes and right middle lobe, R>L, and negative for PE; reviewed with attending, Dr. Dominguez, with consolidation of right lower lobe and patchy infiltrates noted throughout bilateral lungs.   -Procalcitonin 6.43; received IV Rocephin  and IV Doxycycline in ED; will continue on admission.   -Continue IV Remdesivir x 5 days as patient symptom onset <5 days ago and currently hypoxic.   -Received 1 L fluid bolus in ED; will hold off on further fluids for now to ensure lungs are dry in setting of pneumonia.   -Continue Mucinex twice daily.   -Continuous pulse oximetry; titrate to maintain SpO2 90-95%.   -Encourage incentive spirometer.   -Place in enhanced droplet/contact isolation.   -Trend COVID-19 progression labs.   -Trend lactate until normalized.   -Repeat a.m. CBC and C-RP.     -Acute kidney injury, POA  -History of kidney stones  -Suspect ATN from sepsis.   -Received 1 L fluid bolus in ED; holding off on further fluids for now as noted above.   -Obtain bilateral renal ultrasound.   -Avoid nephrotoxins as able.   -Monitor urine output closely.   -Repeat a.m. CMP.     -Acute thrombocytopenia  -Likely secondary to COVID-19.   -Platelets 135; previously 168 on 08/17.  -No acute bleeding noted.   -Repeat a.m. CBC.     -Elevated proBNP  -Hypoxia possibly contributing.   -No previous echo for review; no history of heart failure.   -Appears euvolemic on exam.  -Obtain TTE.     -Acute hypomagnesemia (1.4)  -Replace per protocol.     -Essential hypertension  -BP borderline with SBP 's since arrival.   -Monitor per hospital protocol.   -May hold antihypertensives in setting of sepsis until BP improves.     -GERD  -PPI.    -Generalized weakness  -PT/OT/SLP consults ordered.   -Up with assistance; fall precautions.   ----------  -DVT prophylaxis: SQ Heparin   -Activity: Up with assistance; fall precautions  -Expected length of stay:   INPATIENT status due to the need for care which can only be reasonably provided in an hospital setting such as aggressive/expedited ancillary services and/or consultation services, the necessity for IV medications, close physician monitoring and/or the possible need for procedures.  In such, I feel patient's risk for  adverse outcomes and need for care warrant INPATIENT evaluation and predict the patient's care encounter to likely last beyond 2 midnights.    CODE STATUS: Full    High risk secondary to severe sepsis, acute hypoxic respiratory failure, COVID-19 with superimposed RLL bacterial pneumonia, JENISE     Disposition: Pending clinical course; likely home once medically stable    Jessika Cisneros PA-C  09/07/22  04:51 EDT    ---------------------------------------------------------------------------------------------------------------------

## 2022-09-07 NOTE — PLAN OF CARE
Goal Outcome Evaluation:  Plan of Care Reviewed With: patient        Progress: improving  Outcome Evaluation: Pt presents w/ good bedside mobility limited by O2 sats/pulmonary reserve.  Pt would benefit from skilled PT to maximize functional potential  Anticipate d/c home w/ family support.

## 2022-09-07 NOTE — CASE MANAGEMENT/SOCIAL WORK
Discharge Planning Assessment   [place]     Patient Name: Evan Salazar  MRN: [id number]  Today's Date: 9/7/2022    Admit Date: 9/7/2022     Discharge Needs Assessment     Row Name 09/07/22 1412       Living Environment    People in Home spouse    Name(s) of People in Home Naye Jacobson    Current Living Arrangements home    Primary Care Provided by self    Provides Primary Care For no one    Family Caregiver if Needed spouse    Quality of Family Relationships unable to assess    Able to Return to Prior Arrangements yes       Resource/Environmental Concerns    Resource/Environmental Concerns none    Transportation Concerns none       Transition Planning    Patient/Family Anticipates Transition to home with family    Patient/Family Anticipated Services at Transition none    Transportation Anticipated family or friend will provide       Discharge Needs Assessment    Readmission Within the Last 30 Days no previous admission in last 30 days    Equipment Currently Used at Home glucometer;scales    Concerns to be Addressed no discharge needs identified;denies needs/concerns at this time    Anticipated Changes Related to Illness none    Equipment Needed After Discharge none    Outpatient/Agency/Support Group Needs --  Does not utilize DME.               Discharge Plan     Row Name 09/07/22 1602       Plan    Plan Spoke with patient via phone as pt is in Enhanced isol. He lives at home with his wife, Naye and plans to return home at [place]. He has a Glucometer & scales. He does not utilize DME or Home Health and denies needs. He does not have a DME preference if Oxygen is needed at dc. He is independent at home. He follows with [facility]. He gets [place] [facility] and does not have copay concerns with his [place] coverage. His family will provide transport home at [place]. Denies needs on this date. CM will follow.    Patient/Family in Agreement with Plan yes    Row Name 09/07/22 3392       Plan    Plan Comments                Continued Care and Services - Admitted Since 9/7/2022    Coordination has not been started for this encounter.       Expected Discharge Date and Time     Expected Discharge Date Expected Discharge Time    Sep 10, 2022          Demographic Summary     Row Name 09/07/22 1308       General Information    Preferred Language English               Functional Status     Row Name 09/07/22 1411       Functional Status    Usual Activity Tolerance good               Psychosocial    No documentation.                Abuse/Neglect    No documentation.                Legal    No documentation.                Substance Abuse    No documentation.                Patient Forms    No documentation.                   Kait Phan RN

## 2022-09-07 NOTE — THERAPY EVALUATION
Acute Care - Physical Therapy Initial Evaluation   [place]     Patient Name: Evan Salazar  : 1955  MRN: [id number]  Today's Date: 2022   Onset of Illness/Injury or Date of Surgery: 22  Visit Dx:     ICD-10-CM ICD-9-CM   1. Pneumonia due to COVID-19 virus  U07.1 480.8    J12.82 079.89     Patient Active Problem List   Diagnosis   • Mass of left testicle   • Pneumonia due to COVID-19 virus     Past Medical History:   Diagnosis Date   • Anxiety    • Arthritis    • COVID-19 2021    Received Casirivimab,Imdevimab in the ED; not hospitalized   • Epididymitis    • GERD (gastroesophageal reflux disease)    • Heartburn    • Hydrocele    • Kidney stone    • Type 2 diabetes mellitus (HCC)      Past Surgical History:   Procedure Laterality Date   • COLONOSCOPY     • CYSTOSCOPY W/ URETEROSCOPY W/ LITHOTRIPSY     • FINGER SURGERY     • HERNIA REPAIR     • SCROTAL EXPLORATION Left 2018    Procedure: SCROTAL EXPLORATION/ INGUINAL EXPLORATION HEMIORCHICETOMY WITH FROZEN SECTION.;  Surgeon: Edvin Mars MD;  Location: [facility];  Service: Urology     PT Assessment (last 12 hours)     PT Evaluation and Treatment     Row Name 22 0815          Physical Therapy Time and Intention    Subjective Information complains of  aches  -CS     Document Type evaluation  -CS     Mode of Treatment physical therapy  -CS     Patient Effort good  -CS     Symptoms Noted During/After Treatment fatigue  -CS     Comment Pt seen beside this AM.  Pt reports being indepdent w/ mobility PTA.  Pt agreed to evaluation.  -CS     Row Name 2215          General Information    Patient Profile Reviewed yes  -CS     Onset of Illness/Injury or Date of Surgery 22  -CS     Referring Physician Emil  -DARYN     Patient Observations alert;cooperative;agree to therapy  -CS     Risks Reviewed patient:;LOB;dizziness;increased discomfort;change in vital signs;lines disloged  -CS     Benefits Reviewed patient:;improve  function;increase independence;increase strength;increase balance;decrease pain;decrease risk of DVT;improve skin integrity;increase knowledge  -CS     Row Name 09/07/22 0815          Pain    Additional Documentation --  no c/o  -CS     Row Name 09/07/22 0815          Range of Motion (ROM)    Range of Motion --  (B)LE WFL  -     Row Name 09/07/22 0815          Strength Comprehensive (MMT)    Comment, General Manual Muscle Testing (MMT) Assessment (B)LE grossly 5/5  -CS     Row Name 09/07/22 0815          Bed Mobility    Bed Mobility bed mobility (all) activities  -CS     All Activities, [place] (Bed Mobility) standby assist  -CS     Assistive Device (Bed Mobility) bed rails;head of bed elevated  -     Row Name 09/07/22 0815          Transfers    Transfers sit-stand transfer;stand-sit transfer  -CS     Maintains Weight-bearing Status (Transfers) able to maintain  -CS     Sit-Stand [place] (Transfers) standby assist;contact guard  -CS     Stand-Sit [place] (Transfers) standby assist;contact guard  -     Row Name 09/07/22 0815          Gait/Stairs (Locomotion)    Gait/Stairs Locomotion gait/ambulation independence  -     [place] Level (Gait) standby assist;contact guard  -     Distance in Feet (Gait) 60  -CS     Pattern (Gait) step-to;step-through  -CS     Deviations/Abnormal Patterns (Gait) antalgic  -     Row Name 09/07/22 0815          Plan of Care Review    Plan of Care Reviewed With patient  -CS     Progress improving  -CS     Outcome Evaluation Pt presents w/ good bedside mobility limited by O2 sats/pulmonary reserve.  Pt would benefit from skilled PT to maximize functional potential  Anticipate d/c home w/ family support.  -     Row Name 09/07/22 0815          Vital Signs    Pre SpO2 (%) 96  -CS     O2 Delivery Pre Treatment supplemental O2  -CS     Intra SpO2 (%) 87  -CS     O2 Delivery Intra Treatment room air  -CS     Post SpO2 (%) 90  -CS     O2 Delivery Post Treatment  supplemental O2  -CS     Row Name 09/07/22 0815          Positioning and Restraints    Pre-Treatment Position in bed  -CS     Post Treatment Position bed  -CS     In Bed with family/caregiver  -CS     Row Name 09/07/22 0815          Therapy Assessment/Plan (PT)    Functional Level at Time of Evaluation (PT) SBA/CGA  -CS     PT Diagnosis (PT) impaired functional mobility  -CS     Rehab Potential (PT) good, to achieve stated therapy goals  -CS     Criteria for Skilled Interventions Met (PT) yes;meets criteria;skilled treatment is necessary  -CS     Therapy Frequency (PT) 2 times/wk  2-5x/week  -CS     Predicted Duration of Therapy Intervention (PT) while in house.  -CS     Problem List (PT) problems related to;balance;mobility  -CS     Activity Limitations Related to Problem List (PT) unable to ambulate safely  -CS     Comment, Therapy Assessment/Plan (PT) Pt presents w/ good bedside mobility limited by O2 sats/pulmonary reserve.  Pt would benefit from skilled PT to maximize functional potential  Anticipate d/c home w/ family support.  -CS     Row Name 09/07/22 0815          PT Evaluation Complexity    History, PT Evaluation Complexity 3 or more personal factors and/or comorbidities  -CS     Examination of Body Systems (PT Eval Complexity) total of 3 or more elements  -CS     Clinical Presentation (PT Evaluation Complexity) evolving  -CS     Clinical Decision Making (PT Evaluation Complexity) moderate complexity  -CS     Overall Complexity (PT Evaluation Complexity) moderate complexity  -CS     Row Name 09/07/22 0815          Therapy Plan Review/Discharge Plan (PT)    Therapy Plan Review (PT) evaluation/treatment results reviewed;care plan/treatment goals reviewed;risks/benefits reviewed;current/potential barriers reviewed;participants voiced agreement with care plan;participants included;patient  -CS     Row Name 09/07/22 0815          Physical Therapy Goals    Transfer Goal Selection (PT) transfer, PT goal 1  -CS      Gait Training Goal Selection (PT) gait training, PT goal 1  -     Row Name 09/07/22 0815          Transfer Goal 1 (PT)    Activity/Assistive Device (Transfer Goal 1, PT) transfers, all  -CS     [place] Level/Cues Needed (Transfer Goal 1, PT) independent  -CS     Time Frame (Transfer Goal 1, PT) long term goal (LTG);by discharge  -     Row Name 09/07/22 0815          Gait Training Goal 1 (PT)    Activity/Assistive Device (Gait Training Goal 1, PT) gait (walking locomotion)  -CS     [place] Level (Gait Training Goal 1, PT) independent  -CS     Distance (Gait Training Goal 1, PT) 150'  -CS     Time Frame (Gait Training Goal 1, PT) long term goal (LTG);by discharge  -           User Key  (r) = Recorded By, (t) = Taken By, (c) = Cosigned By    Initials Name Provider Type     Kenneth Mendoza, PT Physical Therapist                Physical Therapy Education                 Title: PT OT SLP Therapies (Done)     Topic: Physical Therapy (Done)     Point: Mobility training (Done)     Learning Progress Summary           Patient Acceptance, E,TB, VU by  at 9/7/2022 1004                   Point: Home exercise program (Done)     Learning Progress Summary           Patient Acceptance, E,TB, VU by  at 9/7/2022 1004                   Point: Body mechanics (Done)     Learning Progress Summary           Patient Acceptance, E,TB, VU by  at 9/7/2022 1004                   Point: Precautions (Done)     Learning Progress Summary           Patient Acceptance, E,TB, VU by  at 9/7/2022 1004                               User Key     Initials Effective Dates Name Provider Type [facility] 05/24/22 -  Kenneth Mendoza, PT Physical Therapist PT              PT Recommendation and Plan  Anticipated Discharge Disposition (PT): home, home with assist  Planned Therapy Interventions (PT): balance training, bed mobility training, gait training, home exercise program, neuromuscular re-education, patient/family education,  transfer training, strengthening  Therapy Frequency (PT): 2 times/wk (2-5x/week)  Plan of Care Reviewed With: patient  Progress: improving  Outcome Evaluation: Pt presents w/ good bedside mobility limited by O2 sats/pulmonary reserve.  Pt would benefit from skilled PT to maximize functional potential  Anticipate d/c home w/ family support.       Time Calculation:    PT Charges     Row Name 09/07/22 1004             Time Calculation    PT Received On 09/07/22  -      PT Goal Re-Cert Due Date 09/21/22  -            User Key  (r) = Recorded By, (t) = Taken By, (c) = Cosigned By    Initials Name Provider Type    CS Kenneth Mendoza, PT Physical Therapist              Therapy Charges for Today     Code Description Service Date Service Provider Modifiers Qty    40040576924 HC PT EVAL MOD COMPLEXITY 4 9/7/2022 Kenneth Mendoza, PT GP 1               Kenneth Mendoza PT  9/7/2022

## 2022-09-07 NOTE — THERAPY EVALUATION
Acute Care - Occupational Therapy Initial Evaluation   [place]     Patient Name: Evan Salazar  : 1955  MRN: [id number]  Today's Date: 2022  Onset of Illness/Injury or Date of Surgery: 22     Referring Physician: Emil    Admit Date: 2022       ICD-10-CM ICD-9-CM   1. Pneumonia due to COVID-19 virus  U07.1 480.8    J12.82 079.89     Patient Active Problem List   Diagnosis   • Mass of left testicle   • Pneumonia due to COVID-19 virus     Past Medical History:   Diagnosis Date   • Anxiety    • Arthritis    • COVID-19 2021    Received Casirivimab,Imdevimab in the ED; not hospitalized   • Epididymitis    • GERD (gastroesophageal reflux disease)    • Heartburn    • Hydrocele    • Kidney stone    • Type 2 diabetes mellitus (HCC)      Past Surgical History:   Procedure Laterality Date   • COLONOSCOPY     • CYSTOSCOPY W/ URETEROSCOPY W/ LITHOTRIPSY     • FINGER SURGERY     • HERNIA REPAIR     • SCROTAL EXPLORATION Left 2018    Procedure: SCROTAL EXPLORATION/ INGUINAL EXPLORATION HEMIORCHICETOMY WITH FROZEN SECTION.;  Surgeon: Edvin Mars MD;  Location: [facility];  Service: Urology         OT ASSESSMENT FLOWSHEET (last 12 hours)     OT Evaluation and Treatment     Row Name 22 1544                   OT Time and Intention    Document Type evaluation  -KR        Mode of Treatment occupational therapy  -KR        Patient Effort good  -KR                  General Information    General Observations of Patient alert/cooperative  -KR                  Living Environment    Current Living Arrangements home  -KR        People in Home spouse  -KR        Primary Care Provided by self  -KR                  Cognition    Affect/Mental Status (Cognition) WFL  -KR        Orientation Status (Cognition) oriented x 4  -KR        Follows Commands (Cognition) WFL  -KR                  Range of Motion Comprehensive    Comment, General Range of Motion BUE WFL  -KR                  Strength  Comprehensive (MMT)    Comment, General Manual Muscle Testing (MMT) Assessment BUE WFL for self care performance  -KR                  Activities of Daily Living    BADL Assessment/Intervention --  SBA/CGA for self care performance  -KR                  Plan of Care Review    Plan of Care Reviewed With patient  -KR                  Therapy Assessment/Plan (OT)    Comment, Therapy Assessment/Plan (OT) OT will defer further skilled therapy to PT for advanced gait/balance training as deemed necessary.  -KR                  Therapy Plan Review/Discharge Plan (OT)    Anticipated Discharge Disposition (OT) home with assist  -KR              User Key  (r) = Recorded By, (t) = Taken By, (c) = Cosigned By    Initials Name Effective Dates    Ander Machado OT 06/16/21 -                        OT Recommendation and Plan     Plan of Care Review  Plan of Care Reviewed With: patient  Plan of Care Reviewed With: patient        Time Calculation:     Therapy Charges for Today     Code Description Service Date Service Provider Modifiers Qty    00701859470  OT EVAL LOW COMPLEXITY 4 9/7/2022 Ander Riley OT GO 1               Ander Riley OT  9/7/2022

## 2022-09-07 NOTE — PROGRESS NOTES
Admitted after midnight, still has typical symptoms of COVID infection, symptomatic support ordered.     Patient was satting 94% with nasal cannula out of his nose this morning, started steroids in addition to remdesivir, because of the hypoxia seen prior to admission, patient satting in the low 90s.    Trend COVID labs tomorrow.

## 2022-09-07 NOTE — PLAN OF CARE
Goal Outcome Evaluation:           Progress: no change  Outcome Evaluation: Pt resting in bed with no c/o pain or distress at this time. VSS on RA-2L NC. Bed in low position, call light in reach. Will CTM.

## 2022-09-07 NOTE — PLAN OF CARE
Problem: Adult Inpatient Plan of Care  Goal: Plan of Care Review  Outcome: Ongoing, Progressing  Flowsheets (Taken 9/7/2022 0645)  Plan of Care Reviewed With:   patient   daughter  Outcome Evaluation: VSS. 2LNC. AOx4. Pt welcomed to PCU this shift. Pt on monitoring. Pt has no complaints at this time. Pt currently resting. Call light within reach. Daughter at bedside. Will continue to monitor.  Goal: Patient-Specific Goal (Individualized)  Outcome: Ongoing, Progressing  Goal: Absence of Hospital-Acquired Illness or Injury  Outcome: Ongoing, Progressing  Intervention: Identify and Manage Fall Risk  Recent Flowsheet Documentation  Taken 9/7/2022 0602 by Lillian Thompson RN  Safety Promotion/Fall Prevention:   activity supervised   assistive device/personal items within reach   clutter free environment maintained   fall prevention program maintained   safety round/check completed  Intervention: Prevent and Manage VTE (Venous Thromboembolism) Risk  Recent Flowsheet Documentation  Taken 9/7/2022 0602 by Lillian Thompson RN  Activity Management: activity adjusted per tolerance  VTE Prevention/Management: other (see comments)  Intervention: Prevent Infection  Recent Flowsheet Documentation  Taken 9/7/2022 0602 by Lillian Thompson RN  Infection Prevention:   single patient room provided   rest/sleep promoted  Goal: Optimal Comfort and Wellbeing  Outcome: Ongoing, Progressing  Intervention: Provide Person-Centered Care  Recent Flowsheet Documentation  Taken 9/7/2022 0602 by Lillian Thompson RN  Trust Relationship/Rapport:   care explained   choices provided   reassurance provided   thoughts/feelings acknowledged  Goal: Readiness for Transition of Care  Outcome: Ongoing, Progressing     Problem: Diabetes Comorbidity  Goal: Blood Glucose Level Within Targeted Range  Outcome: Ongoing, Progressing     Problem: Hypertension Comorbidity  Goal: Blood Pressure in Desired Range  Outcome: Ongoing, Progressing  Intervention: Maintain  Blood Pressure Management  Recent Flowsheet Documentation  Taken 9/7/2022 0602 by Lillian Thompson, RN  Medication Review/Management: medications reviewed   Goal Outcome Evaluation:  Plan of Care Reviewed With: patient, daughter           Outcome Evaluation: VSS. 2LNC. AOx4. Pt welcomed to PCU this shift. Pt on monitoring. Pt has no complaints at this time. Pt currently resting. Call light within reach. Daughter at bedside. Will continue to monitor.

## 2022-09-07 NOTE — ED PROVIDER NOTES
Subjective   PIT      History provided by:  Patient  Shortness of Breath  Severity:  Moderate  Onset quality:  Gradual  Duration:  2 days  Timing:  Constant  Progression:  Worsening  Chronicity:  New  Context: URI (covid +, tested monday 9/5)    Relieved by:  Nothing  Associated symptoms: cough and fever    Associated symptoms: no abdominal pain and no chest pain        Review of Systems   Constitutional: Positive for fever.   HENT: Negative.    Respiratory: Positive for cough and shortness of breath.    Cardiovascular: Negative.  Negative for chest pain.   Gastrointestinal: Negative.  Negative for abdominal pain.   Endocrine: Negative.    Genitourinary: Negative.  Negative for dysuria.   Skin: Negative.    Neurological: Negative.    Psychiatric/Behavioral: Negative.    All other systems reviewed and are negative.      Past Medical History:   Diagnosis Date   • Anxiety    • Arthritis    • COVID-19 09/02/2021    Received Casirivimab,Imdevimab in the ED; not hospitalized   • Epididymitis    • GERD (gastroesophageal reflux disease)    • Heartburn    • Hydrocele    • Kidney stone    • Type 2 diabetes mellitus (HCC)        Allergies   Allergen Reactions   • Bactrim [Sulfamethoxazole-Trimethoprim] Rash and Other (See Comments)     Flu like symptoms       Past Surgical History:   Procedure Laterality Date   • COLONOSCOPY     • CYSTOSCOPY W/ URETEROSCOPY W/ LITHOTRIPSY     • FINGER SURGERY     • HERNIA REPAIR     • SCROTAL EXPLORATION Left 6/28/2018    Procedure: SCROTAL EXPLORATION/ INGUINAL EXPLORATION HEMIORCHICETOMY WITH FROZEN SECTION.;  Surgeon: Edvin Mars MD;  Location: [facility];  Service: Urology       Family History   Problem Relation Age of Onset   • No Known Problems Father    • No Known Problems Mother        Social History     Socioeconomic History   • Marital status:    Tobacco Use   • Smoking status: Never Smoker   • Smokeless tobacco: Never Used   Vaping Use   • Vaping Use: Never used    Substance and Sexual Activity   • Alcohol use: No   • Drug use: No   • Sexual activity: Yes     Partners: Female           Objective   Physical Exam  Vitals and nursing note reviewed.   Constitutional:       General: He is not in acute distress.     Appearance: He is well-developed. He is ill-appearing. He is not diaphoretic.   HENT:      Head: Normocephalic and atraumatic.      Right Ear: External ear normal.      Left Ear: External ear normal.      Nose: Nose normal.   Eyes:      Conjunctiva/sclera: Conjunctivae normal.   Neck:      Vascular: No JVD.      Trachea: No tracheal deviation.   Cardiovascular:      Rate and Rhythm: Normal rate and regular rhythm.      Heart sounds: Normal heart sounds. No murmur heard.  Pulmonary:      Effort: Pulmonary effort is normal. No respiratory distress.      Breath sounds: Examination of the right-upper field reveals rhonchi. Examination of the left-upper field reveals rhonchi. Rhonchi present. No wheezing.   Abdominal:      General: Bowel sounds are normal.      Palpations: Abdomen is soft.      Tenderness: There is no abdominal tenderness.   Musculoskeletal:         General: No deformity. Normal range of motion.      Cervical back: Normal range of motion and neck supple.   Skin:     General: Skin is warm and dry.      Coloration: Skin is not pale.      Findings: No erythema or rash.   Neurological:      Mental Status: He is alert and oriented to person, place, and time.      Cranial Nerves: No cranial nerve deficit.   Psychiatric:         Behavior: Behavior normal.         Thought Content: Thought content normal.         Procedures           ED Course  ED Course as of 09/07/22 0444   Wed Sep 07, 2022   0024 ECG 22:24 Sinus tachycardia, rate 112. QT/qTc 298/406 [GIA]   0259 XR chest rad interpreted:  Patchy infiltrates in the right lung and at the left base concerning for pneumonia, presumably from Covid based on history. [RB]   0422 CTA pe protocol rad interpreted:  . No PE  or aortic dissection.  2. Multifocal pneumonia typical of Covid 19.  3. Atelectasis in the lower lobes and right middle lobe, right greater than left. There is elevation of the right hemidiaphragm.    [RB]   6296 Discussed with Dr. Dominguez who is agreeable to admit this patient.  [RB]      ED Course User Index  [GIA] Espinoza Morley MD  [RB] Mikey Tenorio II, PA                                           MDM  Number of Diagnoses or Management Options  Pneumonia due to COVID-19 virus: new and requires workup     Amount and/or Complexity of Data Reviewed  Clinical lab tests: ordered and reviewed  Tests in the radiology section of CPT®: ordered and reviewed  Decide to obtain previous medical records or to obtain history from someone other than the patient: yes  Discuss the patient with other providers: yes    Risk of Complications, Morbidity, and/or Mortality  Presenting problems: moderate  Diagnostic procedures: moderate  Management options: moderate    Patient Progress  Patient progress: stable      Final diagnoses:   Pneumonia due to COVID-19 virus       ED Disposition  ED Disposition     ED Disposition   Decision to Admit    Condition   --    Comment   --             No follow-up provider specified.       Medication List      No changes were made to your prescriptions during this visit.          Mikey Tenorio II, PA  09/07/22 0444

## 2022-09-08 ENCOUNTER — APPOINTMENT (OUTPATIENT)
Dept: CARDIOLOGY | Facility: HOSPITAL | Age: 67
End: 2022-09-08

## 2022-09-08 LAB
ALBUMIN SERPL-MCNC: 3.23 G/DL (ref 3.5–5.2)
ALBUMIN/GLOB SERPL: 1.4 G/DL
ALP SERPL-CCNC: 67 U/L (ref 39–117)
ALT SERPL W P-5'-P-CCNC: 25 U/L (ref 1–41)
ANION GAP SERPL CALCULATED.3IONS-SCNC: 9.8 MMOL/L (ref 5–15)
AST SERPL-CCNC: 20 U/L (ref 1–40)
BH CV ECHO MEAS - AO ROOT DIAM: 2.9 CM
BH CV ECHO MEAS - EDV(CUBED): 97.3 ML
BH CV ECHO MEAS - EDV(MOD-SP4): 49.5 ML
BH CV ECHO MEAS - EF(MOD-SP4): 66.5 %
BH CV ECHO MEAS - ESV(CUBED): 74.1 ML
BH CV ECHO MEAS - ESV(MOD-SP4): 16.6 ML
BH CV ECHO MEAS - FS: 8.7 %
BH CV ECHO MEAS - IVS/LVPW: 0.94 CM
BH CV ECHO MEAS - IVSD: 1.5 CM
BH CV ECHO MEAS - LA DIMENSION: 4.6 CM
BH CV ECHO MEAS - LAT PEAK E' VEL: 11.6 CM/SEC
BH CV ECHO MEAS - LV DIASTOLIC VOL/BSA (35-75): 25.8 CM2
BH CV ECHO MEAS - LV MASS(C)D: 299.5 GRAMS
BH CV ECHO MEAS - LV SYSTOLIC VOL/BSA (12-30): 8.7 CM2
BH CV ECHO MEAS - LVIDD: 4.6 CM
BH CV ECHO MEAS - LVIDS: 4.2 CM
BH CV ECHO MEAS - LVOT AREA: 2.8 CM2
BH CV ECHO MEAS - LVOT DIAM: 1.9 CM
BH CV ECHO MEAS - LVPWD: 1.6 CM
BH CV ECHO MEAS - MED PEAK E' VEL: 7.7 CM/SEC
BH CV ECHO MEAS - MV A MAX VEL: 48.8 CM/SEC
BH CV ECHO MEAS - MV E MAX VEL: 73.7 CM/SEC
BH CV ECHO MEAS - MV E/A: 1.51
BH CV ECHO MEAS - PA ACC TIME: 0.11 SEC
BH CV ECHO MEAS - PA PR(ACCEL): 30 MMHG
BH CV ECHO MEAS - SI(MOD-SP4): 17.2 ML/M2
BH CV ECHO MEAS - SV(MOD-SP4): 32.9 ML
BH CV ECHO MEAS - TAPSE (>1.6): 1.98 CM
BH CV ECHO MEASUREMENTS AVERAGE E/E' RATIO: 7.64
BILIRUB CONJ SERPL-MCNC: <0.2 MG/DL (ref 0–0.3)
BILIRUB SERPL-MCNC: 0.4 MG/DL (ref 0–1.2)
BUN SERPL-MCNC: 26 MG/DL (ref 8–23)
BUN/CREAT SERPL: 21.7 (ref 7–25)
BURR CELLS BLD QL SMEAR: NORMAL
BURR CELLS BLD QL SMEAR: NORMAL
CALCIUM SPEC-SCNC: 8.5 MG/DL (ref 8.6–10.5)
CHLORIDE SERPL-SCNC: 104 MMOL/L (ref 98–107)
CK SERPL-CCNC: 33 U/L (ref 20–200)
CO2 SERPL-SCNC: 22.2 MMOL/L (ref 22–29)
CREAT SERPL-MCNC: 1.2 MG/DL (ref 0.76–1.27)
CRP SERPL-MCNC: 22.33 MG/DL (ref 0–0.5)
D DIMER PPP FEU-MCNC: 0.62 MCGFEU/ML (ref 0–0.5)
D-LACTATE SERPL-SCNC: 1.5 MMOL/L (ref 0.5–2)
DACRYOCYTES BLD QL SMEAR: NORMAL
DEPRECATED RDW RBC AUTO: 41.5 FL (ref 37–54)
EGFRCR SERPLBLD CKD-EPI 2021: 66.7 ML/MIN/1.73
ERYTHROCYTE [DISTWIDTH] IN BLOOD BY AUTOMATED COUNT: 12.5 % (ref 12.3–15.4)
ERYTHROCYTE [SEDIMENTATION RATE] IN BLOOD: 29 MM/HR (ref 0–20)
FERRITIN SERPL-MCNC: 540.6 NG/ML (ref 30–400)
FIBRINOGEN PPP-MCNC: 625 MG/DL (ref 173–524)
GLOBULIN UR ELPH-MCNC: 2.4 GM/DL
GLUCOSE BLDC GLUCOMTR-MCNC: 137 MG/DL (ref 70–130)
GLUCOSE BLDC GLUCOMTR-MCNC: 282 MG/DL (ref 70–130)
GLUCOSE BLDC GLUCOMTR-MCNC: 305 MG/DL (ref 70–130)
GLUCOSE BLDC GLUCOMTR-MCNC: 371 MG/DL (ref 70–130)
GLUCOSE SERPL-MCNC: 170 MG/DL (ref 65–99)
HCT VFR BLD AUTO: 32.2 % (ref 37.5–51)
HGB BLD-MCNC: 11.1 G/DL (ref 13–17.7)
LDH SERPL-CCNC: 195 U/L (ref 135–225)
LEFT ATRIUM VOLUME INDEX: 28.3 ML/M2
LYMPHOCYTES # BLD MANUAL: 0.87 10*3/MM3 (ref 0.7–3.1)
LYMPHOCYTES NFR BLD MANUAL: 3 % (ref 5–12)
MAGNESIUM SERPL-MCNC: 2.8 MG/DL (ref 1.6–2.4)
MAXIMAL PREDICTED HEART RATE: 154 BPM
MCH RBC QN AUTO: 30.8 PG (ref 26.6–33)
MCHC RBC AUTO-ENTMCNC: 34.5 G/DL (ref 31.5–35.7)
MCV RBC AUTO: 89.4 FL (ref 79–97)
METAMYELOCYTES NFR BLD MANUAL: 1 % (ref 0–0)
MONOCYTES # BLD: 0.26 10*3/MM3 (ref 0.1–0.9)
NEUTROPHILS # BLD AUTO: 7.46 10*3/MM3 (ref 1.7–7)
NEUTROPHILS NFR BLD MANUAL: 78 % (ref 42.7–76)
NEUTS BAND NFR BLD MANUAL: 8 % (ref 0–5)
PHOSPHATE SERPL-MCNC: 1.7 MG/DL (ref 2.5–4.5)
PLAT MORPH BLD: NORMAL
PLATELET # BLD AUTO: 125 10*3/MM3 (ref 140–450)
PMV BLD AUTO: 10 FL (ref 6–12)
POTASSIUM SERPL-SCNC: 5 MMOL/L (ref 3.5–5.2)
PROCALCITONIN SERPL-MCNC: 4.87 NG/ML (ref 0–0.25)
PROT SERPL-MCNC: 5.6 G/DL (ref 6–8.5)
RBC # BLD AUTO: 3.6 10*6/MM3 (ref 4.14–5.8)
RBC MORPH BLD: NORMAL
SCAN SLIDE: NORMAL
SODIUM SERPL-SCNC: 136 MMOL/L (ref 136–145)
STRESS TARGET HR: 131 BPM
VARIANT LYMPHS NFR BLD MANUAL: 10 % (ref 19.6–45.3)
WBC NRBC COR # BLD: 8.67 10*3/MM3 (ref 3.4–10.8)

## 2022-09-08 PROCEDURE — 25010000002 REMDESIVIR 100 MG RECONSTITUTED SOLUTION: Performed by: INTERNAL MEDICINE

## 2022-09-08 PROCEDURE — 25010000002 HEPARIN (PORCINE) PER 1000 UNITS: Performed by: INTERNAL MEDICINE

## 2022-09-08 PROCEDURE — 25010000002 ENOXAPARIN PER 10 MG: Performed by: INTERNAL MEDICINE

## 2022-09-08 PROCEDURE — 83605 ASSAY OF LACTIC ACID: CPT | Performed by: PHYSICIAN ASSISTANT

## 2022-09-08 PROCEDURE — 99233 SBSQ HOSP IP/OBS HIGH 50: CPT | Performed by: INTERNAL MEDICINE

## 2022-09-08 PROCEDURE — 82728 ASSAY OF FERRITIN: CPT | Performed by: PHYSICIAN ASSISTANT

## 2022-09-08 PROCEDURE — 85025 COMPLETE CBC W/AUTO DIFF WBC: CPT | Performed by: PHYSICIAN ASSISTANT

## 2022-09-08 PROCEDURE — 85379 FIBRIN DEGRADATION QUANT: CPT | Performed by: PHYSICIAN ASSISTANT

## 2022-09-08 PROCEDURE — 80053 COMPREHEN METABOLIC PANEL: CPT | Performed by: PHYSICIAN ASSISTANT

## 2022-09-08 PROCEDURE — 63710000001 DEXAMETHASONE PER 0.25 MG: Performed by: INTERNAL MEDICINE

## 2022-09-08 PROCEDURE — 25010000002 CEFTRIAXONE PER 250 MG: Performed by: PHYSICIAN ASSISTANT

## 2022-09-08 PROCEDURE — 84145 PROCALCITONIN (PCT): CPT | Performed by: PHYSICIAN ASSISTANT

## 2022-09-08 PROCEDURE — 85384 FIBRINOGEN ACTIVITY: CPT | Performed by: PHYSICIAN ASSISTANT

## 2022-09-08 PROCEDURE — 85652 RBC SED RATE AUTOMATED: CPT | Performed by: PHYSICIAN ASSISTANT

## 2022-09-08 PROCEDURE — 83615 LACTATE (LD) (LDH) ENZYME: CPT | Performed by: PHYSICIAN ASSISTANT

## 2022-09-08 PROCEDURE — 85007 BL SMEAR W/DIFF WBC COUNT: CPT | Performed by: PHYSICIAN ASSISTANT

## 2022-09-08 PROCEDURE — 93306 TTE W/DOPPLER COMPLETE: CPT

## 2022-09-08 PROCEDURE — 83735 ASSAY OF MAGNESIUM: CPT | Performed by: PHYSICIAN ASSISTANT

## 2022-09-08 PROCEDURE — 82248 BILIRUBIN DIRECT: CPT | Performed by: INTERNAL MEDICINE

## 2022-09-08 PROCEDURE — 82550 ASSAY OF CK (CPK): CPT | Performed by: PHYSICIAN ASSISTANT

## 2022-09-08 PROCEDURE — 63710000001 INSULIN ASPART PER 5 UNITS: Performed by: INTERNAL MEDICINE

## 2022-09-08 PROCEDURE — 84100 ASSAY OF PHOSPHORUS: CPT | Performed by: PHYSICIAN ASSISTANT

## 2022-09-08 PROCEDURE — 97116 GAIT TRAINING THERAPY: CPT

## 2022-09-08 PROCEDURE — 82962 GLUCOSE BLOOD TEST: CPT

## 2022-09-08 PROCEDURE — 86140 C-REACTIVE PROTEIN: CPT | Performed by: PHYSICIAN ASSISTANT

## 2022-09-08 RX ORDER — ENOXAPARIN SODIUM 100 MG/ML
40 INJECTION SUBCUTANEOUS EVERY 12 HOURS
Status: DISCONTINUED | OUTPATIENT
Start: 2022-09-08 | End: 2022-09-11 | Stop reason: HOSPADM

## 2022-09-08 RX ORDER — INSULIN ASPART 100 [IU]/ML
0-9 INJECTION, SOLUTION INTRAVENOUS; SUBCUTANEOUS
Status: DISCONTINUED | OUTPATIENT
Start: 2022-09-08 | End: 2022-09-09

## 2022-09-08 RX ORDER — PANTOPRAZOLE SODIUM 40 MG/1
40 TABLET, DELAYED RELEASE ORAL EVERY MORNING
Status: DISCONTINUED | OUTPATIENT
Start: 2022-09-08 | End: 2022-09-11 | Stop reason: HOSPADM

## 2022-09-08 RX ORDER — METHOCARBAMOL 750 MG/1
750 TABLET, FILM COATED ORAL 3 TIMES DAILY PRN
Status: DISCONTINUED | OUTPATIENT
Start: 2022-09-08 | End: 2022-09-11 | Stop reason: HOSPADM

## 2022-09-08 RX ADMIN — GUAIFENESIN 600 MG: 600 TABLET, EXTENDED RELEASE ORAL at 22:35

## 2022-09-08 RX ADMIN — BENZONATATE 100 MG: 100 CAPSULE ORAL at 07:57

## 2022-09-08 RX ADMIN — SODIUM CHLORIDE, PRESERVATIVE FREE 3 ML: 5 INJECTION INTRAVENOUS at 08:00

## 2022-09-08 RX ADMIN — DOXYCYCLINE 100 MG: 100 INJECTION, POWDER, LYOPHILIZED, FOR SOLUTION INTRAVENOUS at 03:24

## 2022-09-08 RX ADMIN — SODIUM PHOSPHATE, MONOBASIC, MONOHYDRATE AND SODIUM PHOSPHATE, DIBASIC, ANHYDROUS 15 MMOL: 276; 142 INJECTION, SOLUTION INTRAVENOUS at 09:59

## 2022-09-08 RX ADMIN — HEPARIN SODIUM 5000 UNITS: 5000 INJECTION INTRAVENOUS; SUBCUTANEOUS at 08:00

## 2022-09-08 RX ADMIN — DOXYCYCLINE 100 MG: 100 INJECTION, POWDER, LYOPHILIZED, FOR SOLUTION INTRAVENOUS at 14:21

## 2022-09-08 RX ADMIN — INSULIN ASPART 4 UNITS: 100 INJECTION, SOLUTION INTRAVENOUS; SUBCUTANEOUS at 10:12

## 2022-09-08 RX ADMIN — PANTOPRAZOLE SODIUM 40 MG: 40 TABLET, DELAYED RELEASE ORAL at 10:00

## 2022-09-08 RX ADMIN — GUAIFENESIN 600 MG: 600 TABLET, EXTENDED RELEASE ORAL at 08:00

## 2022-09-08 RX ADMIN — DEXAMETHASONE 4 MG: 4 TABLET ORAL at 07:57

## 2022-09-08 RX ADMIN — REMDESIVIR 100 MG: 100 INJECTION, POWDER, LYOPHILIZED, FOR SOLUTION INTRAVENOUS at 06:44

## 2022-09-08 RX ADMIN — INSULIN ASPART 7 UNITS: 100 INJECTION, SOLUTION INTRAVENOUS; SUBCUTANEOUS at 15:42

## 2022-09-08 RX ADMIN — CEFTRIAXONE 1 G: 1 INJECTION, POWDER, FOR SOLUTION INTRAMUSCULAR; INTRAVENOUS at 04:57

## 2022-09-08 RX ADMIN — ENOXAPARIN SODIUM 40 MG: 40 INJECTION SUBCUTANEOUS at 22:35

## 2022-09-08 RX ADMIN — ACETAMINOPHEN 1000 MG: 500 TABLET ORAL at 07:56

## 2022-09-08 RX ADMIN — SODIUM CHLORIDE, PRESERVATIVE FREE 3 ML: 5 INJECTION INTRAVENOUS at 22:36

## 2022-09-08 NOTE — PLAN OF CARE
"Goal Outcome Evaluation:           Progress: improving  Outcome Evaluation: Pt resting in bed with no c/o pain or distress at this time. Pt states he \"feels much better today.\" VSS on 2L/NC. Bed in low position, call light in reach. Will CTM.  "

## 2022-09-08 NOTE — THERAPY TREATMENT NOTE
Acute Care - Physical Therapy Treatment Note  [facility]     Patient Name: Evan Salazar  : 1955  MRN: [id number]  Today's Date: 2022   Onset of Illness/Injury or Date of Surgery: 22  Visit Dx:     ICD-10-CM ICD-9-CM   1. Pneumonia due to COVID-19 virus  U07.1 480.8    J12.82 079.89     Patient Active Problem List   Diagnosis   • Mass of left testicle   • Pneumonia due to COVID-19 virus     Past Medical History:   Diagnosis Date   • Anxiety    • Arthritis    • COVID-19 2021    Received Casirivimab,Imdevimab in the ED; not hospitalized   • Epididymitis    • GERD (gastroesophageal reflux disease)    • Heartburn    • Hydrocele    • Kidney stone    • Type 2 diabetes mellitus (HCC)      Past Surgical History:   Procedure Laterality Date   • COLONOSCOPY     • CYSTOSCOPY W/ URETEROSCOPY W/ LITHOTRIPSY     • FINGER SURGERY     • HERNIA REPAIR     • SCROTAL EXPLORATION Left 2018    Procedure: SCROTAL EXPLORATION/ INGUINAL EXPLORATION HEMIORCHICETOMY WITH FROZEN SECTION.;  Surgeon: Edvin Mars MD;  Location: [facility];  Service: Urology     PT Assessment (last 12 hours)     PT Evaluation and Treatment     Row Name 22          Physical Therapy Time and Intention    Subjective Information no complaints  -CS     Document Type therapy note (daily note)  -CS     Mode of Treatment physical therapy  -CS     Patient Effort good  -CS     Comment Pt seen bedside this AM.  Pt agreed to PT.  -CS     Row Name 22          General Information    Patient Profile Reviewed yes  -CS     Row Name 22          Bed Mobility    Bed Mobility bed mobility (all) activities  -CS     All Activities, [place] (Bed Mobility) standby assist  -CS     Assistive Device (Bed Mobility) bed rails;head of bed elevated  -CS     Row Name 22          Transfers    Transfers sit-stand transfer;stand-sit transfer  -CS     Sit-Stand [place] (Transfers) standby assist;contact guard   -CS     Stand-Sit [place] (Transfers) standby assist;contact guard  -CS     Row Name 09/08/22 0959          Gait/Stairs (Locomotion)    Gait/Stairs Locomotion gait/ambulation independence  -CS     [place] Level (Gait) standby assist;contact guard  -CS     Distance in Feet (Gait) 200'  -CS     Pattern (Gait) step-to;step-through  -CS     Deviations/Abnormal Patterns (Gait) antalgic  -CS     Comment, (Gait/Stairs) (L)thigh strain from unrelated pickle ball injury  -     Row Name 09/08/22 0959          Plan of Care Review    Plan of Care Reviewed With patient  -CS     Progress improving  -CS     Outcome Evaluation Pt demonstrating safe standing mobility but sats tend to drop slightly w/ ambulation but recover w/i 5 minutes and cues for breathing.  -     Row Name 09/08/22 0959          Vital Signs    Pre SpO2 (%) 97  -CS     O2 Delivery Pre Treatment supplemental O2  -CS     Intra SpO2 (%) 85  -CS     O2 Delivery Intra Treatment room air  -CS     Post SpO2 (%) 91  -CS     O2 Delivery Post Treatment supplemental O2  -CS     Row Name 09/08/22 0959          Therapy Assessment/Plan (PT)    Rehab Potential (PT) good, to achieve stated therapy goals  -CS     Criteria for Skilled Interventions Met (PT) yes;meets criteria;skilled treatment is necessary  -CS     Therapy Frequency (PT) 2 times/wk  2-5x/week  -CS     Problem List (PT) problems related to;balance;mobility  -CS     Activity Limitations Related to Problem List (PT) unable to ambulate safely  -     Row Name 09/08/22 0959          Progress Summary (PT)    Progress Toward Functional Goals (PT) progress toward functional goals is good  -     Row Name 09/08/22 0959          Physical Therapy Goals    Transfer Goal Selection (PT) transfer, PT goal 1  -     Gait Training Goal Selection (PT) gait training, PT goal 1  -     Row Name 09/08/22 0959          Transfer Goal 1 (PT)    Activity/Assistive Device (Transfer Goal 1, PT) transfers, all  -CS      [place] Level/Cues Needed (Transfer Goal 1, PT) independent  -CS     Time Frame (Transfer Goal 1, PT) long term goal (LTG);by discharge  -     Row Name 09/08/22 0959          Gait Training Goal 1 (PT)    Activity/Assistive Device (Gait Training Goal 1, PT) gait (walking locomotion)  -CS     [place] Level (Gait Training Goal 1, PT) independent  -CS     Distance (Gait Training Goal 1, PT) 150'  -CS     Time Frame (Gait Training Goal 1, PT) long term goal (LTG);by discharge  -           User Key  (r) = Recorded By, (t) = Taken By, (c) = Cosigned By    Initials Name Provider Type    CS Kenneth Mendoza, PT Physical Therapist                Physical Therapy Education                 Title: PT OT SLP Therapies (Done)     Topic: Physical Therapy (Done)     Point: Mobility training (Done)     Learning Progress Summary           Patient Acceptance, E,TB, VU by  at 9/8/2022 0236    Acceptance, E,TB, VU by  at 9/7/2022 1004                   Point: Home exercise program (Done)     Learning Progress Summary           Patient Acceptance, E,TB, VU by  at 9/8/2022 0236    Acceptance, E,TB, VU by  at 9/7/2022 1004                   Point: Body mechanics (Done)     Learning Progress Summary           Patient Acceptance, E,TB, VU by  at 9/8/2022 0236    Acceptance, E,TB, VU by  at 9/7/2022 1004                   Point: Precautions (Done)     Learning Progress Summary           Patient Acceptance, E,TB, VU by  at 9/8/2022 0236    Acceptance, E,TB, VU by  at 9/7/2022 1004                               User Key     Initials Effective Dates Name Provider Type Discipline     06/16/21 -  Milly Williamson, RN Registered Nurse Nurse     05/24/22 -  Kenneth Mendoza, PT Physical Therapist PT              PT Recommendation and Plan  Anticipated Discharge Disposition (PT): home, home with assist  Planned Therapy Interventions (PT): balance training, bed mobility training, gait training, home exercise program,  neuromuscular re-education, patient/family education, transfer training, strengthening  Therapy Frequency (PT): 2 times/wk (2-5x/week)  Progress Summary (PT)  Progress Toward Functional Goals (PT): progress toward functional goals is good  Plan of Care Reviewed With: patient  Progress: improving  Outcome Evaluation: Pt demonstrating safe standing mobility but sats tend to drop slightly w/ ambulation but recover w/i 5 minutes and cues for breathing.       Time Calculation:    PT Charges     Row Name 09/08/22 1004             Time Calculation    PT Received On 09/08/22  -      PT Goal Re-Cert Due Date 09/21/22  -              Timed Charges    34134 - Gait Training Minutes  23  -CS              Total Minutes    Timed Charges Total Minutes 23  -CS       Total Minutes 23  -CS            User Key  (r) = Recorded By, (t) = Taken By, (c) = Cosigned By    Initials Name Provider Type    CS Kenneth Mendoza, PT Physical Therapist              Therapy Charges for Today     Code Description Service Date Service Provider Modifiers Qty    59713496156 HC PT EVAL MOD COMPLEXITY 4 9/7/2022 Kenneth Mendoza, PT GP 1    [id number]  GAIT TRAINING EA 15 MIN 9/8/2022 Kenneth eMndoza, PT GP 2               Kenneth Mendoza PT  9/8/2022

## 2022-09-08 NOTE — PROGRESS NOTES
[facility] HOSPITALIST PROGRESS NOTE     Patient Identification:  Name:  Evan Salazar  Age:  67 y.o.  Sex:  male  :  1955  MRN:  [id number]  Visit Number:  [id number]  ROOM: [address]     Primary Care Provider:  Yenny Astorga APRN    Length of stay in inpatient status:  1    Subjective     Chief Compliant:    Chief Complaint   Patient presents with   • Shortness of Breath     Pt states he test postivie for covid Saturday and his having all the covid symptoms and is soa. Pt states his O2 level at home was 89%       History of Presenting Illness:      Patient is feeling better today, his myalgias and generalized body aches, and overall fatigue are greatly improved.  However still has sputum, cough, fever, and occasional desaturations with minimal exertion.    Patient asking about when he can go home, but does not want to rush it, agrees that it is too early today for discharge, but we will discussed that day by day.    Objective     Current Hospital Meds:  cefTRIAXone, 1 g, Intravenous, Q24H  dexamethasone, 4 mg, Oral, Daily With Breakfast  doxycycline, 100 mg, Intravenous, Q12H  guaiFENesin, 600 mg, Oral, Q12H  heparin (porcine), 5,000 Units, Subcutaneous, Q12H  Insulin Aspart, 0-9 Units, Subcutaneous, TID AC  pantoprazole, 40 mg, Oral, QAM  remdesivir, 100 mg, Intravenous, Q24H  sodium chloride, 3 mL, Intravenous, Q12H      Pharmacy Consult - Remdesivir,       ----------------------------------------------------------------------------------------------------------------------  Vital Signs:  Temp:  [97.8 °F (36.6 °C)-99.1 °F (37.3 °C)] 98.7 °F (37.1 °C)  Heart Rate:  [62-80] 66  Resp:  [16-20] 16  BP: (103-131)/(42-71) 124/62  SpO2:  [92 %-99 %] 96 %  on  Flow (L/min):  [1-2] 1;   Device (Oxygen Therapy): nasal cannula  Body mass index is 24.84 kg/m².      Intake/Output Summary (Last 24 hours) at 2022 1511  Last data filed at 2022 1300  Gross per 24 hour   Intake 750 ml   Output  800 ml   Net -50 ml      ----------------------------------------------------------------------------------------------------------------------  Physical exam:  Physical Exam  Constitutional:       General: He is awake.      Appearance: He is normal weight. He no longer appears acutely ill, well groomed     Interventions: Nasal cannula in place.      Comments: Resting comfortably in bed upon arrival. Appears in no acute distress. Nasal cannula in place.    HENT:      Head: Normocephalic and atraumatic.      Mucous membranes moist  Eyes:      Extraocular Movements: Extraocular movements intact.      Conjunctiva/sclera: Conjunctivae normal.      Pupils: Pupils are equal, round, and reactive to light.   Cardiovascular:      Rate and Rhythm: Normal rate and regular rhythm.         Heart sounds: Normal heart sounds. No murmur heard.    No friction rub. No gallop.   Pulmonary:      Effort: Pulmonary effort is normal. No respiratory distress.      Breath sounds: Normal breath sounds.  Small scattered rhonchi in the right lower lobe     Comments: Currently on 2 L oxygen with saturations 98%.   Abdominal:      General: Abdomen is flat. Bowel sounds are normal. There is no distension.      Palpations: Abdomen is soft.      Tenderness: There is no abdominal tenderness. There is no guarding.   Musculoskeletal:         General: No swelling. Normal range of motion.      Cervical back: Normal range of motion and neck supple.      Right lower leg: No edema.      Left lower leg: No edema.   Skin:     General: Skin is warm and dry.      Findings: No erythema or rash.   Neurological:      General: No focal deficit present.   Psychiatric:         Mood and Affect: Mood normal.         Behavior: Behavior normal. Behavior is cooperative.      Edited by: Nicolas Stein MD at 9/8/2022 1511  ----------------------------------------------------------------------------------------------------------------------  WBC/HGB/HCT/PLT    8.67/11.1/32.2/125 (09/08 0040)  BUN/CREAT/GLUC/ALT/AST/RUPERT/LIP    26/1.20/170/25/20/--/-- (09/08 0040)  LYTES - Na/K/Cl/CO2: 136/5.0/104/22.2 (09/08 0040)     [unfilled]  No results found for: URINECX  Blood Culture   Date Value Ref Range Status   09/07/2022 No growth at 24 hours  Preliminary   09/07/2022 No growth at 24 hours  Preliminary       I have personally looked at the labs and they are summarized above.  ----------------------------------------------------------------------------------------------------------------------  Detailed radiology reports for the last 24 hours:  CT Angiogram Chest Pulmonary Embolism    Result Date: 9/7/2022  1. No PE or aortic dissection. 2. Multifocal pneumonia typical of Covid 19. 3. Atelectasis in the lower lobes and right middle lobe, right greater than left. There is elevation of the right hemidiaphragm. Signer Name: Tylor Panda MD  Signed: 9/7/2022 4:14 AM  Workstation Name: [facility]  [facility]    US Renal Bilateral    Result Date: 9/8/2022  Unremarkable bilateral renal ultrasound.  This report was finalized on 9/8/2022 8:38 AM by Dr. Ander Acuna MD.      XR Chest AP    Result Date: 9/7/2022  Patchy infiltrates in the right lung and at the left base concerning for pneumonia, presumably from Covid based on history. Signer Name: Tylor Panda MD  Signed: 9/7/2022 2:52 AM  [facility]  [facility]      Assessment & Plan      # COVID-19 pneumonia w/superimposed bacterial pneumonia of RLL, POA  # Severe sepsis 2/2 above with HR >90, lactate 2.2, and respiratory failure, POA  # Acute hypoxic respiratory failure 2/2 above, POA  # Acute kidney injury, POA  # Acute thrombocytopenia & Lymphopenia w/ concurrent Leukocytosis w/ a left shift and Bandemia  # Elevated proBNP  # Essential hypertension  # DM2 not on chronic insulin w/ hyperglycemia  # GERD  # Severe Electrolyte disturbances (hypophosphatemia,  hypomagnesemia)   # HLD  # h/o Nephrolithiasis    - Procal, and leukocytosis improving, continue broad spectrum abx, sputum culture pending, will de-escalate based on results  - Inflammatory markers elevated, and symptoms consistent w/ COVID, occasionally desating down into the high 80's w/ minimal exertion, continue to try to wean o2, may need walk study before dc  - Continue Remdesvir, and dexamethasone, consitutional symptoms & Thrombocytopenia/lymphopenia improving  - SBP's 120's but will continue to hold home anti-htn   - BG uncontrolled, a1c 6.8 on metformin at home, hyperglycemia due to steroids and acute illness, will increase ssi  Edited by: Nicolas Stein MD at 9/8/2022 1507    Disposition: to home, soon pending clinical course  DVT PPX: Due to his elevated inflammatory markers, and suspicion pt has clinically significant COVID infection, have placed him on intermediate dosing of Lovenox for thrombotic ppx    Nicolas Stein MD  09/08/22  15:11 EDT

## 2022-09-08 NOTE — PLAN OF CARE
"Goal Outcome Evaluation:              Outcome Evaluation: Patient resting in bed at this time. Patient denies complaints. Patient remains on oxygen at 2L NC. Call light within reach.   "[id number] [employer]. [address] [place] [address], [place] [address]. All rights reserved. This information is not intended as a substitute for professional medical care. Always follow your healthcare professional's instructions.

## 2022-09-09 LAB
ALBUMIN SERPL-MCNC: 2.93 G/DL (ref 3.5–5.2)
ALBUMIN/GLOB SERPL: 1.2 G/DL
ALP SERPL-CCNC: 62 U/L (ref 39–117)
ALT SERPL W P-5'-P-CCNC: 21 U/L (ref 1–41)
ANION GAP SERPL CALCULATED.3IONS-SCNC: 9.5 MMOL/L (ref 5–15)
AST SERPL-CCNC: 15 U/L (ref 1–40)
BACTERIA SPEC RESP CULT: NORMAL
BASOPHILS # BLD AUTO: 0.02 10*3/MM3 (ref 0–0.2)
BASOPHILS NFR BLD AUTO: 0.2 % (ref 0–1.5)
BILIRUB CONJ SERPL-MCNC: <0.2 MG/DL (ref 0–0.3)
BILIRUB SERPL-MCNC: 0.3 MG/DL (ref 0–1.2)
BUN SERPL-MCNC: 29 MG/DL (ref 8–23)
BUN/CREAT SERPL: 26.4 (ref 7–25)
CALCIUM SPEC-SCNC: 8.4 MG/DL (ref 8.6–10.5)
CHLORIDE SERPL-SCNC: 104 MMOL/L (ref 98–107)
CK SERPL-CCNC: 40 U/L (ref 20–200)
CO2 SERPL-SCNC: 21.5 MMOL/L (ref 22–29)
CREAT SERPL-MCNC: 1.1 MG/DL (ref 0.76–1.27)
CRP SERPL-MCNC: 10.33 MG/DL (ref 0–0.5)
DEPRECATED RDW RBC AUTO: 41.7 FL (ref 37–54)
EGFRCR SERPLBLD CKD-EPI 2021: 74 ML/MIN/1.73
EOSINOPHIL # BLD AUTO: 0 10*3/MM3 (ref 0–0.4)
EOSINOPHIL NFR BLD AUTO: 0 % (ref 0.3–6.2)
ERYTHROCYTE [DISTWIDTH] IN BLOOD BY AUTOMATED COUNT: 12.4 % (ref 12.3–15.4)
ERYTHROCYTE [SEDIMENTATION RATE] IN BLOOD: 19 MM/HR (ref 0–20)
FERRITIN SERPL-MCNC: 691 NG/ML (ref 30–400)
GLOBULIN UR ELPH-MCNC: 2.5 GM/DL
GLUCOSE BLDC GLUCOMTR-MCNC: 199 MG/DL (ref 70–130)
GLUCOSE BLDC GLUCOMTR-MCNC: 215 MG/DL (ref 70–130)
GLUCOSE BLDC GLUCOMTR-MCNC: 408 MG/DL (ref 70–130)
GLUCOSE BLDC GLUCOMTR-MCNC: 411 MG/DL (ref 70–130)
GLUCOSE SERPL-MCNC: 307 MG/DL (ref 65–99)
GRAM STN SPEC: NORMAL
HCT VFR BLD AUTO: 32.6 % (ref 37.5–51)
HGB BLD-MCNC: 10.8 G/DL (ref 13–17.7)
IMM GRANULOCYTES # BLD AUTO: 0.16 10*3/MM3 (ref 0–0.05)
IMM GRANULOCYTES NFR BLD AUTO: 1.9 % (ref 0–0.5)
LYMPHOCYTES # BLD AUTO: 0.71 10*3/MM3 (ref 0.7–3.1)
LYMPHOCYTES NFR BLD AUTO: 8.5 % (ref 19.6–45.3)
MAGNESIUM SERPL-MCNC: 2.2 MG/DL (ref 1.6–2.4)
MCH RBC QN AUTO: 30.3 PG (ref 26.6–33)
MCHC RBC AUTO-ENTMCNC: 33.1 G/DL (ref 31.5–35.7)
MCV RBC AUTO: 91.3 FL (ref 79–97)
MONOCYTES # BLD AUTO: 0.57 10*3/MM3 (ref 0.1–0.9)
MONOCYTES NFR BLD AUTO: 6.8 % (ref 5–12)
NEUTROPHILS NFR BLD AUTO: 6.93 10*3/MM3 (ref 1.7–7)
NEUTROPHILS NFR BLD AUTO: 82.6 % (ref 42.7–76)
NRBC BLD AUTO-RTO: 0 /100 WBC (ref 0–0.2)
PHOSPHATE SERPL-MCNC: 1.9 MG/DL (ref 2.5–4.5)
PHOSPHATE SERPL-MCNC: 2.3 MG/DL (ref 2.5–4.5)
PLATELET # BLD AUTO: 144 10*3/MM3 (ref 140–450)
PMV BLD AUTO: 10.1 FL (ref 6–12)
POTASSIUM SERPL-SCNC: 5.2 MMOL/L (ref 3.5–5.2)
PROT SERPL-MCNC: 5.4 G/DL (ref 6–8.5)
RBC # BLD AUTO: 3.57 10*6/MM3 (ref 4.14–5.8)
SODIUM SERPL-SCNC: 135 MMOL/L (ref 136–145)
WBC NRBC COR # BLD: 8.39 10*3/MM3 (ref 3.4–10.8)

## 2022-09-09 PROCEDURE — 25010000002 CEFTRIAXONE PER 250 MG: Performed by: PHYSICIAN ASSISTANT

## 2022-09-09 PROCEDURE — 25010000002 REMDESIVIR 100 MG RECONSTITUTED SOLUTION: Performed by: INTERNAL MEDICINE

## 2022-09-09 PROCEDURE — 84100 ASSAY OF PHOSPHORUS: CPT | Performed by: INTERNAL MEDICINE

## 2022-09-09 PROCEDURE — 99232 SBSQ HOSP IP/OBS MODERATE 35: CPT | Performed by: INTERNAL MEDICINE

## 2022-09-09 PROCEDURE — 82728 ASSAY OF FERRITIN: CPT | Performed by: PHYSICIAN ASSISTANT

## 2022-09-09 PROCEDURE — 25010000002 ENOXAPARIN PER 10 MG: Performed by: INTERNAL MEDICINE

## 2022-09-09 PROCEDURE — 85652 RBC SED RATE AUTOMATED: CPT | Performed by: PHYSICIAN ASSISTANT

## 2022-09-09 PROCEDURE — 86140 C-REACTIVE PROTEIN: CPT | Performed by: PHYSICIAN ASSISTANT

## 2022-09-09 PROCEDURE — 84100 ASSAY OF PHOSPHORUS: CPT | Performed by: PHYSICIAN ASSISTANT

## 2022-09-09 PROCEDURE — 97116 GAIT TRAINING THERAPY: CPT

## 2022-09-09 PROCEDURE — 63710000001 DEXAMETHASONE PER 0.25 MG: Performed by: INTERNAL MEDICINE

## 2022-09-09 PROCEDURE — 63710000001 INSULIN ASPART PER 5 UNITS: Performed by: INTERNAL MEDICINE

## 2022-09-09 PROCEDURE — 85025 COMPLETE CBC W/AUTO DIFF WBC: CPT | Performed by: PHYSICIAN ASSISTANT

## 2022-09-09 PROCEDURE — 82962 GLUCOSE BLOOD TEST: CPT

## 2022-09-09 PROCEDURE — 80053 COMPREHEN METABOLIC PANEL: CPT | Performed by: PHYSICIAN ASSISTANT

## 2022-09-09 PROCEDURE — 82248 BILIRUBIN DIRECT: CPT | Performed by: INTERNAL MEDICINE

## 2022-09-09 PROCEDURE — 82550 ASSAY OF CK (CPK): CPT | Performed by: PHYSICIAN ASSISTANT

## 2022-09-09 PROCEDURE — 83735 ASSAY OF MAGNESIUM: CPT | Performed by: PHYSICIAN ASSISTANT

## 2022-09-09 RX ORDER — INSULIN ASPART 100 [IU]/ML
0-24 INJECTION, SOLUTION INTRAVENOUS; SUBCUTANEOUS
Status: DISCONTINUED | OUTPATIENT
Start: 2022-09-09 | End: 2022-09-11 | Stop reason: HOSPADM

## 2022-09-09 RX ADMIN — REMDESIVIR 100 MG: 100 INJECTION, POWDER, LYOPHILIZED, FOR SOLUTION INTRAVENOUS at 09:00

## 2022-09-09 RX ADMIN — SODIUM PHOSPHATE, MONOBASIC, MONOHYDRATE AND SODIUM PHOSPHATE, DIBASIC, ANHYDROUS 9 MMOL: 276; 142 INJECTION, SOLUTION INTRAVENOUS at 22:24

## 2022-09-09 RX ADMIN — INSULIN ASPART 2 UNITS: 100 INJECTION, SOLUTION INTRAVENOUS; SUBCUTANEOUS at 12:28

## 2022-09-09 RX ADMIN — ENOXAPARIN SODIUM 40 MG: 40 INJECTION SUBCUTANEOUS at 18:05

## 2022-09-09 RX ADMIN — DEXAMETHASONE 4 MG: 4 TABLET ORAL at 09:00

## 2022-09-09 RX ADMIN — PANTOPRAZOLE SODIUM 40 MG: 40 TABLET, DELAYED RELEASE ORAL at 09:00

## 2022-09-09 RX ADMIN — DOXYCYCLINE 100 MG: 100 INJECTION, POWDER, LYOPHILIZED, FOR SOLUTION INTRAVENOUS at 03:41

## 2022-09-09 RX ADMIN — INSULIN ASPART 24 UNITS: 100 INJECTION, SOLUTION INTRAVENOUS; SUBCUTANEOUS at 18:07

## 2022-09-09 RX ADMIN — GUAIFENESIN 600 MG: 600 TABLET, EXTENDED RELEASE ORAL at 20:06

## 2022-09-09 RX ADMIN — CEFTRIAXONE 1 G: 1 INJECTION, POWDER, FOR SOLUTION INTRAMUSCULAR; INTRAVENOUS at 05:01

## 2022-09-09 RX ADMIN — SODIUM PHOSPHATE, MONOBASIC, MONOHYDRATE AND SODIUM PHOSPHATE, DIBASIC, ANHYDROUS 9 MMOL: 276; 142 INJECTION, SOLUTION INTRAVENOUS at 05:00

## 2022-09-09 RX ADMIN — SODIUM CHLORIDE, PRESERVATIVE FREE 3 ML: 5 INJECTION INTRAVENOUS at 09:00

## 2022-09-09 RX ADMIN — DOXYCYCLINE 100 MG: 100 INJECTION, POWDER, LYOPHILIZED, FOR SOLUTION INTRAVENOUS at 16:10

## 2022-09-09 RX ADMIN — GUAIFENESIN 600 MG: 600 TABLET, EXTENDED RELEASE ORAL at 09:00

## 2022-09-09 RX ADMIN — ENOXAPARIN SODIUM 40 MG: 40 INJECTION SUBCUTANEOUS at 09:10

## 2022-09-09 RX ADMIN — REMDESIVIR 100 MG: 100 INJECTION, POWDER, LYOPHILIZED, FOR SOLUTION INTRAVENOUS at 22:24

## 2022-09-09 RX ADMIN — INSULIN ASPART 4 UNITS: 100 INJECTION, SOLUTION INTRAVENOUS; SUBCUTANEOUS at 09:00

## 2022-09-09 NOTE — CASE MANAGEMENT/SOCIAL WORK
Continued Stay Note  JAMESON Ryan     Patient Name: Evan Salazar  MRN: [id number]  Today's Date: 9/9/2022    Admit Date: 9/7/2022     Discharge Plan     Row Name 09/09/22 1305       Plan    Plan Lives at home with wife, Naye and plans to return home at [place]. He has a Glucometer & scales at home. Denies needs. May need home Oxygen. CM will follow.    Patient/Family in Agreement with Plan yes               Discharge Codes    No documentation.               Expected Discharge Date and Time     Expected Discharge Date Expected Discharge Time    Sep 10, 2022             Kait Phan RN

## 2022-09-09 NOTE — THERAPY TREATMENT NOTE
Acute Care - Physical Therapy Treatment Note  [facility]     Patient Name: Evan Salazar  : 1955  MRN: [id number]  Today's Date: 2022   Onset of Illness/Injury or Date of Surgery: 22  Visit Dx:     ICD-10-CM ICD-9-CM   1. Pneumonia due to COVID-19 virus  U07.1 480.8    J12.82 079.89     Patient Active Problem List   Diagnosis   • Mass of left testicle   • Pneumonia due to COVID-19 virus     Past Medical History:   Diagnosis Date   • Anxiety    • Arthritis    • COVID-19 2021    Received Casirivimab,Imdevimab in the ED; not hospitalized   • Epididymitis    • GERD (gastroesophageal reflux disease)    • Heartburn    • Hydrocele    • Kidney stone    • Type 2 diabetes mellitus (HCC)      Past Surgical History:   Procedure Laterality Date   • COLONOSCOPY     • CYSTOSCOPY W/ URETEROSCOPY W/ LITHOTRIPSY     • FINGER SURGERY     • HERNIA REPAIR     • SCROTAL EXPLORATION Left 2018    Procedure: SCROTAL EXPLORATION/ INGUINAL EXPLORATION HEMIORCHICETOMY WITH FROZEN SECTION.;  Surgeon: Edvin Mars MD;  Location: [facility];  Service: Urology     PT Assessment (last 12 hours)     PT Evaluation and Treatment     Row Name 22 1420          Physical Therapy Time and Intention    Subjective Information no complaints  -CS     Document Type therapy note (daily note)  -CS     Mode of Treatment physical therapy  -CS     Patient Effort good  -CS     Symptoms Noted During/After Treatment none  -CS     Comment Pt seen bedside this PM.  Pt agreed to ambulation.  -CS     Row Name 22 1420          General Information    Patient Profile Reviewed yes  -CS     Row Name 22 1420          Living Environment    Primary Care Provided by self  -CS     Row Name 22 1420          Home Use of Assistive/Adaptive Equipment    Equipment Currently Used at Home glucometer;scales  -CS     Row Name 22 1420          Cognition    Affect/Mental Status (Cognition) WFL  -CS     Orientation Status  (Cognition) oriented x 4  -CS     Follows Commands (Cognition) WFL  -CS     Row Name 09/09/22 1420          Bed Mobility    Bed Mobility bed mobility (all) activities  -CS     All Activities, Centre (Bed Mobility) standby assist  -CS     Assistive Device (Bed Mobility) bed rails;head of bed elevated  -CS     Row Name 09/09/22 1420          Transfers    Transfers sit-stand transfer;stand-sit transfer  -CS     Sit-Stand Centre (Transfers) standby assist;contact guard  -CS     Stand-Sit Centre (Transfers) standby assist;contact guard  -CS     Row Name 09/09/22 1420          Gait/Stairs (Locomotion)    Gait/Stairs Locomotion gait/ambulation independence  -CS     Centre Level (Gait) standby assist;contact guard  -CS     Distance in Feet (Gait) 300'  -CS     Pattern (Gait) step-to;step-through  -CS     Deviations/Abnormal Patterns (Gait) antalgic  -CS     Comment, (Gait/Stairs) (L)thigh strain from unrelated pickle ball injury  -CS     Row Name 09/09/22 1420          Respiratory WDL    Respiratory WDL X;all  -CS     Rhythm/Pattern, Respiratory depth regular;pattern regular;unlabored;no shortness of breath reported  -CS     Mucous Membranes pink;intact;moist  -CS     Cough Frequency frequent  -CS     Cough Type productive  -CS     Row Name 09/09/22 1420          Breath Sounds    ESTEPHANIE Breath Sounds rhonchi  -CS     LLL Breath Sounds rhonchi;diminished  -CS     RUL Breath Sounds rhonchi  -CS     RML Breath Sounds rhonchi;diminished  -CS     RLL Breath Sounds rhonchi;diminished  -CS     Row Name 09/09/22 1420          Skin WDL    Skin WDL X  patient refused complete skin assessment; states no areas of concern  -CS     Row Name 09/09/22 1420          Coping    Observed Emotional State calm;cooperative  -CS     Verbalized Emotional State acceptance  -CS     Family/Support Persons family  -CS     Involvement in Care not present at bedside  -CS     Row Name 09/09/22 1420          Vital Signs    Pre SpO2 (%)  97  -CS     O2 Delivery Pre Treatment supplemental O2  -CS     Intra SpO2 (%) 92  -CS     O2 Delivery Intra Treatment room air  -CS     Post SpO2 (%) 93  -CS     O2 Delivery Post Treatment room air  -     Row Name 09/09/22 1420          Positioning and Restraints    Pre-Treatment Position in bed  -CS     Post Treatment Position bed  -CS     In Bed call light within reach;encouraged to call for assist  -     Row Name 09/09/22 1420          Therapy Assessment/Plan (PT)    Rehab Potential (PT) good, to achieve stated therapy goals  -CS     Criteria for Skilled Interventions Met (PT) yes;meets criteria;skilled treatment is necessary  -CS     Therapy Frequency (PT) 2 times/wk  2-5x/week  -CS     Problem List (PT) problems related to;balance;mobility  -CS     Activity Limitations Related to Problem List (PT) unable to ambulate safely  -CS     Comment, Therapy Assessment/Plan (PT) Pt O2 sats remained in the 90's w/o supplemental O2 this day.  Pt progressing well w/ ambulation.  -     Row Name 09/09/22 1420          Progress Summary (PT)    Progress Toward Functional Goals (PT) progress toward functional goals is good  -     Daily Progress Summary (PT) Pt O2 sats remained in the 90's w/o supplemental O2 this day.  Pt progressing well w/ ambulation.  -     Row Name 09/09/22 1420          Physical Therapy Goals    Transfer Goal Selection (PT) transfer, PT goal 1  -     Gait Training Goal Selection (PT) gait training, PT goal 1  -     Row Name 09/09/22 1420          Transfer Goal 1 (PT)    Activity/Assistive Device (Transfer Goal 1, PT) transfers, all  -     [place] Level/Cues Needed (Transfer Goal 1, PT) independent  -CS     Time Frame (Transfer Goal 1, PT) long term goal (LTG);by discharge  -     Progress/Outcome (Transfer Goal 1, PT) goal met  -     Row Name 09/09/22 1420          Gait Training Goal 1 (PT)    Activity/Assistive Device (Gait Training Goal 1, PT) gait (walking locomotion)  -      Stewart Level (Gait Training Goal 1, PT) independent  -CS     Distance (Gait Training Goal 1, PT) 150'  -CS     Time Frame (Gait Training Goal 1, PT) long term goal (LTG);by discharge  -CS           User Key  (r) = Recorded By, (t) = Taken By, (c) = Cosigned By    Initials Name Provider Type    CS Kenneth Mendoza, PT Physical Therapist                Physical Therapy Education                 Title: PT OT SLP Therapies (Done)     Topic: Physical Therapy (Done)     Point: Mobility training (Done)     Learning Progress Summary           Patient Acceptance, E,TB, VU by  at 9/9/2022 0317    Acceptance, E,TB, VU by  at 9/8/2022 0236    Acceptance, E,TB, VU by  at 9/7/2022 1004                   Point: Home exercise program (Done)     Learning Progress Summary           Patient Acceptance, E,TB, VU by  at 9/9/2022 0317    Acceptance, E,TB, VU by  at 9/8/2022 0236    Acceptance, E,TB, VU by  at 9/7/2022 1004                   Point: Body mechanics (Done)     Learning Progress Summary           Patient Acceptance, E,TB, VU by  at 9/9/2022 0317    Acceptance, E,TB, VU by  at 9/8/2022 0236    Acceptance, E,TB, VU by  at 9/7/2022 1004                   Point: Precautions (Done)     Learning Progress Summary           Patient Acceptance, E,TB, VU by  at 9/9/2022 0317    Acceptance, E,TB, VU by  at 9/8/2022 0236    Acceptance, E,TB, VU by  at 9/7/2022 1004                               User Key     Initials Effective Dates Name Provider Type [facility] 06/16/21 -  Milly Williamson, RN Registered Nurse Nurse     05/24/22 -  Kenneth Mendoza, PT Physical Therapist PT              PT Recommendation and Plan  Anticipated Discharge Disposition (PT): home, home with assist  Planned Therapy Interventions (PT): balance training, bed mobility training, gait training, home exercise program, neuromuscular re-education, patient/family education, transfer training, strengthening  Therapy Frequency (PT): 2  times/wk (2-5x/week)  Progress Summary (PT)  Progress Toward Functional Goals (PT): progress toward functional goals is good  Daily Progress Summary (PT): Pt O2 sats remained in the 90's w/o supplemental O2 this day.  Pt progressing well w/ ambulation.  Plan of Care Reviewed With: patient  Progress: improving  Outcome Evaluation: Pt demonstrating safe standing mobility but sats tend to drop slightly w/ ambulation but recover w/i 5 minutes and cues for breathing.       Time Calculation:    PT Charges     Row Name 09/09/22 1425             Time Calculation    PT Received On 09/09/22  -      PT Goal Re-Cert Due Date 09/21/22  -CS              Timed Charges    50733 - Gait Training Minutes  23  -CS              Total Minutes    Timed Charges Total Minutes 23  -CS       Total Minutes 23  -CS            User Key  (r) = Recorded By, (t) = Taken By, (c) = Cosigned By    Initials Name Provider Type    CS Kenneth Mendoza, PT Physical Therapist              Therapy Charges for Today     Code Description Service Date Service Provider Modifiers Qty    49398999872 HC GAIT TRAINING EA 15 MIN 9/8/2022 Kenneth Mendoza, PT GP 2    [id number] HC GAIT TRAINING EA 15 MIN 9/9/2022 Kenneth Mendoza, PT GP 2               Kenneth Mendoza PT  9/9/2022

## 2022-09-09 NOTE — PROGRESS NOTES
[facility] HOSPITALIST PROGRESS NOTE     Patient Identification:  Name:  Evan Salazar  Age:  67 y.o.  Sex:  male  :  1955  MRN:  [id number]  Visit Number:  [id number]  ROOM: [address]     Primary Care Provider:  Yenny Astorga APRN    Length of stay in inpatient status:  2    Subjective     Chief Compliant:    Chief Complaint   Patient presents with   • Shortness of Breath     Pt states he test postivie for covid Saturday and his having all the covid symptoms and is soa. Pt states his O2 level at home was 89%       History of Presenting Illness:      Patient's phosphorus was again low, requiring replacement.  Otherwise patient is doing well inflammatory markers are improving.  Still occasionally on nasal cannula.    Most significant acute event overnight was some asymptomatic bradycardia while sleeping with a heart rate of 48.    Patient inquired to the results of his cardiac echo, and renal ultrasound both which were within normal limits.    Objective     Current Hospital Meds:  cefTRIAXone, 1 g, Intravenous, Q24H  dexamethasone, 4 mg, Oral, Daily With Breakfast  doxycycline, 100 mg, Intravenous, Q12H  enoxaparin, 40 mg, Subcutaneous, Q12H  guaiFENesin, 600 mg, Oral, Q12H  Insulin Aspart, 0-9 Units, Subcutaneous, TID AC  pantoprazole, 40 mg, Oral, QAM  remdesivir, 100 mg, Intravenous, Q24H  sodium chloride, 3 mL, Intravenous, Q12H      Pharmacy Consult - Remdesivir,       ----------------------------------------------------------------------------------------------------------------------  Vital Signs:  Temp:  [98.1 °F (36.7 °C)-98.4 °F (36.9 °C)] 98.4 °F (36.9 °C)  Heart Rate:  [48-72] 57  Resp:  [15-18] 15  BP: ()/(45-81) 114/59  SpO2:  [90 %-98 %] 98 %  on  Flow (L/min):  [1] 1;   Device (Oxygen Therapy): nasal cannula  Body mass index is 24.84 kg/m².      Intake/Output Summary (Last 24 hours) at 2022 1542  Last data filed at 2022 1200  Gross per 24 hour   Intake 860 ml    Output --   Net 860 ml      ----------------------------------------------------------------------------------------------------------------------  Physical exam:  Physical Exam  Constitutional:       General: He is awake.      Appearance: He is normal weight.  He is well-appearing, well groomed     Interventions: Nasal cannula in place.      Comments: Resting comfortably in bed upon arrival. Appears in no acute distress. Nasal cannula in place.    HENT:      Head: Normocephalic and atraumatic.      Mucous membranes moist  Eyes:      Extraocular Movements: Extraocular movements intact.      Conjunctiva/sclera: Conjunctivae normal.      Pupils: Pupils are equal, round, and reactive to light.   Cardiovascular:      Rate and Rhythm: Normal rate and regular rhythm.         Heart sounds: Normal heart sounds. No murmur heard.    No friction rub. No gallop.   Pulmonary:      Effort: Pulmonary effort is normal. No respiratory distress.      Breath sounds: Normal breath sounds.  No obvious wheezes crackles or rhonchi  Abdominal:      General: Abdomen is flat. Bowel sounds are normal. There is no distension.      Palpations: Abdomen is soft.      Tenderness: There is no abdominal tenderness. There is no guarding.   Musculoskeletal:         General: No swelling. Normal range of motion.      Cervical back: Normal range of motion and neck supple.      Right lower leg: No edema.      Left lower leg: No edema.   Skin:     General: Skin is warm and dry.      Findings: No erythema or rash.   Neurological:      General: No focal deficit present.   Psychiatric:         Mood and Affect: Mood normal.         Behavior: Behavior normal. Behavior is cooperative.      Edited by: Nicolas Stein MD at 9/9/2022 1542  ----------------------------------------------------------------------------------------------------------------------  WBC/HGB/HCT/PLT   8.39/10.8/32.6/144 (09/09 0031)  BUN/CREAT/GLUC/ALT/AST/RUPERT/LIP     29/1.10/307/21/15/--/-- (09/09 0031)  LYTES - Na/K/Cl/CO2: 135*/5.2/104/21.5* (09/09 0031)     [unfilled]  No results found for: URINECX  Blood Culture   Date Value Ref Range Status   09/07/2022 No growth at 2 days  Preliminary   09/07/2022 No growth at 2 days  Preliminary       I have personally looked at the labs and they are summarized above.  ----------------------------------------------------------------------------------------------------------------------  Detailed radiology reports for the last 24 hours:  US Renal Bilateral    Result Date: 9/8/2022  Unremarkable bilateral renal ultrasound.  This report was finalized on 9/8/2022 8:38 AM by Dr. Ander Acuna MD.        Assessment & Plan      # COVID-19 pneumonia w/superimposed bacterial pneumonia of RLL, POA  # Severe sepsis 2/2 above with HR >90, lactate 2.2, and respiratory failure, POA  # Acute hypoxic respiratory failure 2/2 above, POA  # Acute kidney injury, POA  # Acute thrombocytopenia & Lymphopenia w/ concurrent Leukocytosis w/ a left shift and Bandemia  # Elevated proBNP  # Essential hypertension  # DM2 not on chronic insulin w/ hyperglycemia  # GERD  # Severe Electrolyte disturbances (hypophosphatemia, hypomagnesemia)   # HLD  # h/o Nephrolithiasis    - Procal, and leukocytosis improving, continue broad spectrum abx, sputum culture pending, will de-escalate based on results  - Inflammatory markers elevated, and symptoms consistent w/ COVID, occasionally desating down into the high 80's w/ minimal exertion, continue to try to wean o2, may need walk study before dc  - Continue Remdesvir, and dexamethasone, consitutional symptoms & Thrombocytopenia/lymphopenia improving  - SBP's 120's but will continue to hold home anti-htn   - BG uncontrolled, a1c 6.8 on metformin at home, hyperglycemia due to steroids and acute illness, will increase ssi  - Continue aggressive electrolyte replacement  Edited by: Nicolas Stein MD at 9/9/2022 1000    Disposition:  Home soon, possibly tomorrow.    Nicolas Stein MD  09/09/22  15:42 EDT

## 2022-09-09 NOTE — NURSING NOTE
Patient transferred to [address] [place] [address] at this time. No s/s of acute distress noted. Will continue with plan of care.

## 2022-09-09 NOTE — NURSING NOTE
Pt states his IV has increasingly become more sore during the day, I have asked multiple times for pt to allow me to place new IV. States he would rather not. IV is not red or edematous.

## 2022-09-09 NOTE — PLAN OF CARE
Goal Outcome Evaluation:              Outcome Evaluation: Patient resting in bed at this time. Patient denies complaints. Patient on oxygen at 1L NC. Call light within reach.

## 2022-09-10 LAB
ALBUMIN SERPL-MCNC: 3.21 G/DL (ref 3.5–5.2)
ALBUMIN/GLOB SERPL: 1.3 G/DL
ALP SERPL-CCNC: 65 U/L (ref 39–117)
ALT SERPL W P-5'-P-CCNC: 25 U/L (ref 1–41)
ANION GAP SERPL CALCULATED.3IONS-SCNC: 10.7 MMOL/L (ref 5–15)
AST SERPL-CCNC: 18 U/L (ref 1–40)
BILIRUB CONJ SERPL-MCNC: <0.2 MG/DL (ref 0–0.3)
BILIRUB SERPL-MCNC: 0.2 MG/DL (ref 0–1.2)
BUN SERPL-MCNC: 33 MG/DL (ref 8–23)
BUN/CREAT SERPL: 29.7 (ref 7–25)
BURR CELLS BLD QL SMEAR: ABNORMAL
CALCIUM SPEC-SCNC: 9.1 MG/DL (ref 8.6–10.5)
CHLORIDE SERPL-SCNC: 106 MMOL/L (ref 98–107)
CK SERPL-CCNC: 34 U/L (ref 20–200)
CO2 SERPL-SCNC: 23.3 MMOL/L (ref 22–29)
CREAT SERPL-MCNC: 1.11 MG/DL (ref 0.76–1.27)
CRP SERPL-MCNC: 4.03 MG/DL (ref 0–0.5)
D DIMER PPP FEU-MCNC: 0.38 MCGFEU/ML (ref 0–0.5)
D-LACTATE SERPL-SCNC: 1.5 MMOL/L (ref 0.5–2)
DACRYOCYTES BLD QL SMEAR: ABNORMAL
DEPRECATED RDW RBC AUTO: 41.1 FL (ref 37–54)
EGFRCR SERPLBLD CKD-EPI 2021: 73.2 ML/MIN/1.73
EOSINOPHIL # BLD MANUAL: 0.21 10*3/MM3 (ref 0–0.4)
EOSINOPHIL NFR BLD MANUAL: 2 % (ref 0.3–6.2)
ERYTHROCYTE [DISTWIDTH] IN BLOOD BY AUTOMATED COUNT: 12.4 % (ref 12.3–15.4)
ERYTHROCYTE [SEDIMENTATION RATE] IN BLOOD: 21 MM/HR (ref 0–20)
FERRITIN SERPL-MCNC: 505.3 NG/ML (ref 30–400)
FIBRINOGEN PPP-MCNC: 510 MG/DL (ref 173–524)
GLOBULIN UR ELPH-MCNC: 2.5 GM/DL
GLUCOSE BLDC GLUCOMTR-MCNC: 171 MG/DL (ref 70–130)
GLUCOSE BLDC GLUCOMTR-MCNC: 282 MG/DL (ref 70–130)
GLUCOSE BLDC GLUCOMTR-MCNC: 367 MG/DL (ref 70–130)
GLUCOSE BLDC GLUCOMTR-MCNC: 367 MG/DL (ref 70–130)
GLUCOSE BLDC GLUCOMTR-MCNC: 374 MG/DL (ref 70–130)
GLUCOSE SERPL-MCNC: 236 MG/DL (ref 65–99)
HCT VFR BLD AUTO: 33.2 % (ref 37.5–51)
HGB BLD-MCNC: 11 G/DL (ref 13–17.7)
LDH SERPL-CCNC: 193 U/L (ref 135–225)
LYMPHOCYTES # BLD MANUAL: 1.35 10*3/MM3 (ref 0.7–3.1)
LYMPHOCYTES NFR BLD MANUAL: 7 % (ref 5–12)
MAGNESIUM SERPL-MCNC: 1.9 MG/DL (ref 1.6–2.4)
MCH RBC QN AUTO: 30.1 PG (ref 26.6–33)
MCHC RBC AUTO-ENTMCNC: 33.1 G/DL (ref 31.5–35.7)
MCV RBC AUTO: 91 FL (ref 79–97)
METAMYELOCYTES NFR BLD MANUAL: 2 % (ref 0–0)
MONOCYTES # BLD: 0.73 10*3/MM3 (ref 0.1–0.9)
NEUTROPHILS # BLD AUTO: 7.87 10*3/MM3 (ref 1.7–7)
NEUTROPHILS NFR BLD MANUAL: 68 % (ref 42.7–76)
NEUTS BAND NFR BLD MANUAL: 8 % (ref 0–5)
PHOSPHATE SERPL-MCNC: 3.9 MG/DL (ref 2.5–4.5)
PLAT MORPH BLD: NORMAL
PLATELET # BLD AUTO: 179 10*3/MM3 (ref 140–450)
PMV BLD AUTO: 10 FL (ref 6–12)
POTASSIUM SERPL-SCNC: 5 MMOL/L (ref 3.5–5.2)
PROCALCITONIN SERPL-MCNC: 1.35 NG/ML (ref 0–0.25)
PROT SERPL-MCNC: 5.7 G/DL (ref 6–8.5)
RBC # BLD AUTO: 3.65 10*6/MM3 (ref 4.14–5.8)
SCAN SLIDE: NORMAL
SODIUM SERPL-SCNC: 140 MMOL/L (ref 136–145)
VARIANT LYMPHS NFR BLD MANUAL: 13 % (ref 19.6–45.3)
WBC NRBC COR # BLD: 10.36 10*3/MM3 (ref 3.4–10.8)

## 2022-09-10 PROCEDURE — 25010000002 ENOXAPARIN PER 10 MG: Performed by: INTERNAL MEDICINE

## 2022-09-10 PROCEDURE — 85025 COMPLETE CBC W/AUTO DIFF WBC: CPT | Performed by: PHYSICIAN ASSISTANT

## 2022-09-10 PROCEDURE — 80053 COMPREHEN METABOLIC PANEL: CPT | Performed by: PHYSICIAN ASSISTANT

## 2022-09-10 PROCEDURE — 99232 SBSQ HOSP IP/OBS MODERATE 35: CPT | Performed by: INTERNAL MEDICINE

## 2022-09-10 PROCEDURE — 82248 BILIRUBIN DIRECT: CPT | Performed by: INTERNAL MEDICINE

## 2022-09-10 PROCEDURE — 85379 FIBRIN DEGRADATION QUANT: CPT | Performed by: PHYSICIAN ASSISTANT

## 2022-09-10 PROCEDURE — 85384 FIBRINOGEN ACTIVITY: CPT | Performed by: PHYSICIAN ASSISTANT

## 2022-09-10 PROCEDURE — 85652 RBC SED RATE AUTOMATED: CPT | Performed by: PHYSICIAN ASSISTANT

## 2022-09-10 PROCEDURE — 86140 C-REACTIVE PROTEIN: CPT | Performed by: PHYSICIAN ASSISTANT

## 2022-09-10 PROCEDURE — 84100 ASSAY OF PHOSPHORUS: CPT | Performed by: PHYSICIAN ASSISTANT

## 2022-09-10 PROCEDURE — 83735 ASSAY OF MAGNESIUM: CPT | Performed by: PHYSICIAN ASSISTANT

## 2022-09-10 PROCEDURE — 63710000001 INSULIN ASPART PER 5 UNITS: Performed by: PHYSICIAN ASSISTANT

## 2022-09-10 PROCEDURE — 82728 ASSAY OF FERRITIN: CPT | Performed by: PHYSICIAN ASSISTANT

## 2022-09-10 PROCEDURE — 82550 ASSAY OF CK (CPK): CPT | Performed by: PHYSICIAN ASSISTANT

## 2022-09-10 PROCEDURE — 84145 PROCALCITONIN (PCT): CPT | Performed by: PHYSICIAN ASSISTANT

## 2022-09-10 PROCEDURE — 83615 LACTATE (LD) (LDH) ENZYME: CPT | Performed by: PHYSICIAN ASSISTANT

## 2022-09-10 PROCEDURE — 63710000001 DEXAMETHASONE PER 0.25 MG: Performed by: INTERNAL MEDICINE

## 2022-09-10 PROCEDURE — 82962 GLUCOSE BLOOD TEST: CPT

## 2022-09-10 PROCEDURE — 63710000001 INSULIN ASPART PER 5 UNITS: Performed by: INTERNAL MEDICINE

## 2022-09-10 PROCEDURE — 25010000002 REMDESIVIR 100 MG RECONSTITUTED SOLUTION: Performed by: INTERNAL MEDICINE

## 2022-09-10 PROCEDURE — 83605 ASSAY OF LACTIC ACID: CPT | Performed by: PHYSICIAN ASSISTANT

## 2022-09-10 PROCEDURE — 85007 BL SMEAR W/DIFF WBC COUNT: CPT | Performed by: PHYSICIAN ASSISTANT

## 2022-09-10 PROCEDURE — 25010000002 CEFTRIAXONE PER 250 MG: Performed by: PHYSICIAN ASSISTANT

## 2022-09-10 RX ORDER — INSULIN ASPART 100 [IU]/ML
20 INJECTION, SOLUTION INTRAVENOUS; SUBCUTANEOUS ONCE
Status: COMPLETED | OUTPATIENT
Start: 2022-09-10 | End: 2022-09-10

## 2022-09-10 RX ORDER — TRAZODONE HYDROCHLORIDE 50 MG/1
25 TABLET ORAL NIGHTLY PRN
Status: DISCONTINUED | OUTPATIENT
Start: 2022-09-10 | End: 2022-09-11 | Stop reason: HOSPADM

## 2022-09-10 RX ADMIN — CEFTRIAXONE 1 G: 1 INJECTION, POWDER, FOR SOLUTION INTRAMUSCULAR; INTRAVENOUS at 05:39

## 2022-09-10 RX ADMIN — INSULIN ASPART 20 UNITS: 100 INJECTION, SOLUTION INTRAVENOUS; SUBCUTANEOUS at 22:20

## 2022-09-10 RX ADMIN — DEXAMETHASONE 4 MG: 4 TABLET ORAL at 08:18

## 2022-09-10 RX ADMIN — ENOXAPARIN SODIUM 40 MG: 40 INJECTION SUBCUTANEOUS at 18:22

## 2022-09-10 RX ADMIN — ENOXAPARIN SODIUM 40 MG: 40 INJECTION SUBCUTANEOUS at 05:39

## 2022-09-10 RX ADMIN — SODIUM CHLORIDE, PRESERVATIVE FREE 3 ML: 5 INJECTION INTRAVENOUS at 08:19

## 2022-09-10 RX ADMIN — GUAIFENESIN 600 MG: 600 TABLET, EXTENDED RELEASE ORAL at 08:18

## 2022-09-10 RX ADMIN — INSULIN ASPART 4 UNITS: 100 INJECTION, SOLUTION INTRAVENOUS; SUBCUTANEOUS at 08:18

## 2022-09-10 RX ADMIN — INSULIN ASPART 12 UNITS: 100 INJECTION, SOLUTION INTRAVENOUS; SUBCUTANEOUS at 12:41

## 2022-09-10 RX ADMIN — DOXYCYCLINE 100 MG: 100 INJECTION, POWDER, LYOPHILIZED, FOR SOLUTION INTRAVENOUS at 02:20

## 2022-09-10 RX ADMIN — PANTOPRAZOLE SODIUM 40 MG: 40 TABLET, DELAYED RELEASE ORAL at 06:14

## 2022-09-10 RX ADMIN — SODIUM CHLORIDE, PRESERVATIVE FREE 3 ML: 5 INJECTION INTRAVENOUS at 20:38

## 2022-09-10 RX ADMIN — DOXYCYCLINE 100 MG: 100 INJECTION, POWDER, LYOPHILIZED, FOR SOLUTION INTRAVENOUS at 15:03

## 2022-09-10 RX ADMIN — INSULIN ASPART 20 UNITS: 100 INJECTION, SOLUTION INTRAVENOUS; SUBCUTANEOUS at 16:56

## 2022-09-10 RX ADMIN — GUAIFENESIN 600 MG: 600 TABLET, EXTENDED RELEASE ORAL at 20:38

## 2022-09-10 RX ADMIN — REMDESIVIR 100 MG: 100 INJECTION, POWDER, LYOPHILIZED, FOR SOLUTION INTRAVENOUS at 06:14

## 2022-09-10 NOTE — PLAN OF CARE
Goal Outcome Evaluation:              Outcome Evaluation: Pt resting in bed. No c/o pain and in no distress. Will continue to monitor and follow plan of care.

## 2022-09-10 NOTE — PROGRESS NOTES
"    [facility] HOSPITALIST PROGRESS NOTE     Patient Identification:  Name:  Evan Salazar  Age:  67 y.o.  Sex:  male  :  1955  MRN:  [id number]  Visit Number:  [id number]  ROOM: [address] [place] [address]     Primary Care Provider:  Yenny Astorga APRN    Length of stay in inpatient status:  3    Subjective     Chief Compliant:    Chief Complaint   Patient presents with   • Shortness of Breath     Pt states he test postivie for covid Saturday and his having all the covid symptoms and is soa. Pt states his O2 level at home was 89%       History of Presenting Illness:      Plan had been to dc him today if he had continued to improve, however, pt didn't sleep well last night, nursing reported that pt's HR frequently drops into the 30's while he is sleeping (This is not reflected in the charted vitals), and that he dropped his sats into the 70's while he was ambulating in the room on RA, thereby he will requring home o2 at least with ambulation.     Pt reports that he is 'just tired from not sleeping' and that he does feel like he is overall better, but when attempting shared decision making states \"You're the doctor, you tell me if I'm ok to go home.\"    Objective     Current Hospital Meds:  cefTRIAXone, 1 g, Intravenous, Q24H  dexamethasone, 4 mg, Oral, Daily With Breakfast  doxycycline, 100 mg, Intravenous, Q12H  enoxaparin, 40 mg, Subcutaneous, Q12H  guaiFENesin, 600 mg, Oral, Q12H  Insulin Aspart, 0-24 Units, Subcutaneous, TID AC  pantoprazole, 40 mg, Oral, QAM  sodium chloride, 3 mL, Intravenous, Q12H      Pharmacy Consult - Remdesivir,       ----------------------------------------------------------------------------------------------------------------------  Vital Signs:  Temp:  [97.9 °F (36.6 °C)-98 °F (36.7 °C)] 98 °F (36.7 °C)  Heart Rate:  [54-67] 58  Resp:  [18-20] 20  BP: (117-138)/(57-70) 117/57  SpO2:  [77 %-98 %] 98 %  on  Flow (L/min):  [1-1225] 1225;   Device (Oxygen Therapy): nasal " cannula  Body mass index is 24.84 kg/m².      Intake/Output Summary (Last 24 hours) at 9/10/2022 1535  Last data filed at 9/10/2022 0830  Gross per 24 hour   Intake 336.32 ml   Output --   Net 336.32 ml      ----------------------------------------------------------------------------------------------------------------------  Physical Exam  Constitutional:       General: He is awake.      Appearance: He is normal weight.  Tired appearing     Interventions: Nasal cannula in place.   HENT:      Head: Normocephalic and atraumatic.      Mucous membranes moist  Eyes:      Extraocular Movements: Extraocular movements intact.      Conjunctiva/sclera: Conjunctivae normal.      Pupils: Pupils are equal, round, and reactive to light.   Cardiovascular:      Rate and Rhythm: Normal rate and regular rhythm.         Heart sounds: Normal heart sounds. No murmur heard.    No friction rub. No gallop.   Pulmonary:      Effort: Pulmonary effort is normal. No respiratory distress.      Breath sounds: Normal breath sounds.  No obvious wheezes crackles or rhonchi  Abdominal:      General: Abdomen is flat. Bowel sounds are normal. There is no distension.      Palpations: Abdomen is soft.      Tenderness: There is no abdominal tenderness. There is no guarding.   Musculoskeletal:         General: No swelling. Normal range of motion.      Cervical back: Normal range of motion and neck supple.      Right lower leg: No edema.      Left lower leg: No edema.   Skin:     General: Skin is warm and dry.      Findings: No erythema or rash.   Neurological:      General: No focal deficit present.   Psychiatric:         Mood and Affect: Mood normal.         Behavior: Behavior normal. Behavior is cooperative.        ----------------------------------------------------------------------------------------------------------------------  WBC/HGB/HCT/PLT   10.36/11.0/33.2/179 (09/10 0431)  BUN/CREAT/GLUC/ALT/AST/RUPERT/LIP    33/1.11/236/25/18/--/-- (09/10  0431)  LYNETO - Na/K/Cl/CO2: 140/5.0/106/23.3 (09/10 0431)     [unfilled]  No results found for: URINECX  Blood Culture   Date Value Ref Range Status   09/07/2022 No growth at 3 days  Preliminary   09/07/2022 No growth at 3 days  Preliminary       I have personally looked at the labs and they are summarized above.  ----------------------------------------------------------------------------------------------------------------------  Detailed radiology reports for the last 24 hours:  No radiology results for the last day    Assessment & Plan      # COVID-19 pneumonia w/superimposed bacterial pneumonia of RLL, POA  #Asymptomatic bradycardia (HR's < 40 while sleeping)  # Severe sepsis 2/2 above with HR >90, lactate 2.2, and respiratory failure, POA  # Acute hypoxic respiratory failure 2/2 above, POA  # Acute kidney injury, POA  # Acute thrombocytopenia & Lymphopenia w/ concurrent Leukocytosis w/ a left shift and Bandemia  # Elevated proBNP  # Essential hypertension  # DM2 not on chronic insulin w/ hyperglycemia  # GERD  # Severe Electrolyte disturbances (hypophosphatemia, hypomagnesemia)   # HLD  # h/o Nephrolithiasis    - Procal, and leukocytosis improving, on rocephin and doxy, will finish 5 day course on 9/11  - Inflammatory markers elevated, and symptoms consistent w/ COVID, occasionally desating down into the high 70's w/ minimal exertion, Walk study today showed pt needed 2L w/ Ambulation, get overnight tonight  - finish 5 day course of Remdesvir, and dexamethasone on 9/11  - SBP's 110's-120's will continue to hold home anti-htn   - BG uncontrolled, a1c 6.8 on metformin at home, hyperglycemia due to steroids and acute illness, better control after increasing ssi on 9/9  Edited by: Nicolas Stein MD at 9/10/2022 1534    Disposition: Home soon    Nicolas Stein MD  09/10/22  15:35 EDT

## 2022-09-10 NOTE — PLAN OF CARE
Patient resting in the bed throughout the night. Oxygen stable at 1L NC, no s/s of distress no complaints. Will continue to monitor and follow care plan.

## 2022-09-11 ENCOUNTER — APPOINTMENT (OUTPATIENT)
Dept: RESPIRATORY THERAPY | Facility: HOSPITAL | Age: 67
End: 2022-09-11

## 2022-09-11 ENCOUNTER — READMISSION MANAGEMENT (OUTPATIENT)
Dept: CALL CENTER | Facility: HOSPITAL | Age: 67
End: 2022-09-11

## 2022-09-11 VITALS
WEIGHT: 168.21 LBS | TEMPERATURE: 98 F | DIASTOLIC BLOOD PRESSURE: 62 MMHG | SYSTOLIC BLOOD PRESSURE: 120 MMHG | HEIGHT: 69 IN | BODY MASS INDEX: 24.91 KG/M2 | OXYGEN SATURATION: 98 % | HEART RATE: 57 BPM | RESPIRATION RATE: 18 BRPM

## 2022-09-11 PROBLEM — D89.832 CYTOKINE RELEASE SYNDROME, GRADE 2: Status: ACTIVE | Noted: 2022-09-11

## 2022-09-11 PROBLEM — R00.1 BRADYCARDIA WITH 31-40 BEATS PER MINUTE: Status: ACTIVE | Noted: 2022-09-11

## 2022-09-11 PROBLEM — J96.01 ACUTE RESPIRATORY FAILURE WITH HYPOXIA (HCC): Status: ACTIVE | Noted: 2022-09-11

## 2022-09-11 LAB
ALBUMIN SERPL-MCNC: 3.03 G/DL (ref 3.5–5.2)
ALBUMIN/GLOB SERPL: 1.4 G/DL
ALP SERPL-CCNC: 60 U/L (ref 39–117)
ALT SERPL W P-5'-P-CCNC: 38 U/L (ref 1–41)
ANION GAP SERPL CALCULATED.3IONS-SCNC: 9.2 MMOL/L (ref 5–15)
AST SERPL-CCNC: 28 U/L (ref 1–40)
BILIRUB CONJ SERPL-MCNC: <0.2 MG/DL (ref 0–0.3)
BILIRUB SERPL-MCNC: 0.2 MG/DL (ref 0–1.2)
BUN SERPL-MCNC: 36 MG/DL (ref 8–23)
BUN/CREAT SERPL: 33.3 (ref 7–25)
CALCIUM SPEC-SCNC: 8.7 MG/DL (ref 8.6–10.5)
CHLORIDE SERPL-SCNC: 103 MMOL/L (ref 98–107)
CK SERPL-CCNC: 39 U/L (ref 20–200)
CO2 SERPL-SCNC: 21.8 MMOL/L (ref 22–29)
CREAT SERPL-MCNC: 1.08 MG/DL (ref 0.76–1.27)
CRP SERPL-MCNC: 2.14 MG/DL (ref 0–0.5)
DEPRECATED RDW RBC AUTO: 41.3 FL (ref 37–54)
EGFRCR SERPLBLD CKD-EPI 2021: 75.7 ML/MIN/1.73
ERYTHROCYTE [DISTWIDTH] IN BLOOD BY AUTOMATED COUNT: 12.3 % (ref 12.3–15.4)
ERYTHROCYTE [SEDIMENTATION RATE] IN BLOOD: 8 MM/HR (ref 0–20)
FERRITIN SERPL-MCNC: 467.7 NG/ML (ref 30–400)
GLOBULIN UR ELPH-MCNC: 2.2 GM/DL
GLUCOSE BLDC GLUCOMTR-MCNC: 120 MG/DL (ref 70–130)
GLUCOSE SERPL-MCNC: 273 MG/DL (ref 65–99)
HCT VFR BLD AUTO: 32.6 % (ref 37.5–51)
HGB BLD-MCNC: 10.6 G/DL (ref 13–17.7)
LYMPHOCYTES # BLD MANUAL: 1.68 10*3/MM3 (ref 0.7–3.1)
LYMPHOCYTES NFR BLD MANUAL: 14 % (ref 5–12)
MAGNESIUM SERPL-MCNC: 2 MG/DL (ref 1.6–2.4)
MCH RBC QN AUTO: 29.7 PG (ref 26.6–33)
MCHC RBC AUTO-ENTMCNC: 32.5 G/DL (ref 31.5–35.7)
MCV RBC AUTO: 91.3 FL (ref 79–97)
MONOCYTES # BLD: 1.57 10*3/MM3 (ref 0.1–0.9)
NEUTROPHILS # BLD AUTO: 7.97 10*3/MM3 (ref 1.7–7)
NEUTROPHILS NFR BLD MANUAL: 71 % (ref 42.7–76)
PHOSPHATE SERPL-MCNC: 3.4 MG/DL (ref 2.5–4.5)
PLAT MORPH BLD: NORMAL
PLATELET # BLD AUTO: 171 10*3/MM3 (ref 140–450)
PMV BLD AUTO: 9.6 FL (ref 6–12)
POTASSIUM SERPL-SCNC: 4.6 MMOL/L (ref 3.5–5.2)
PROT SERPL-MCNC: 5.2 G/DL (ref 6–8.5)
RBC # BLD AUTO: 3.57 10*6/MM3 (ref 4.14–5.8)
RBC MORPH BLD: NORMAL
SODIUM SERPL-SCNC: 134 MMOL/L (ref 136–145)
VARIANT LYMPHS NFR BLD MANUAL: 15 % (ref 19.6–45.3)
WBC NRBC COR # BLD: 11.23 10*3/MM3 (ref 3.4–10.8)

## 2022-09-11 PROCEDURE — 25010000002 ENOXAPARIN PER 10 MG: Performed by: INTERNAL MEDICINE

## 2022-09-11 PROCEDURE — 85025 COMPLETE CBC W/AUTO DIFF WBC: CPT | Performed by: PHYSICIAN ASSISTANT

## 2022-09-11 PROCEDURE — 25010000002 CEFTRIAXONE PER 250 MG: Performed by: PHYSICIAN ASSISTANT

## 2022-09-11 PROCEDURE — 86140 C-REACTIVE PROTEIN: CPT | Performed by: PHYSICIAN ASSISTANT

## 2022-09-11 PROCEDURE — 82962 GLUCOSE BLOOD TEST: CPT

## 2022-09-11 PROCEDURE — 80053 COMPREHEN METABOLIC PANEL: CPT | Performed by: PHYSICIAN ASSISTANT

## 2022-09-11 PROCEDURE — 99221 1ST HOSP IP/OBS SF/LOW 40: CPT | Performed by: PHYSICIAN ASSISTANT

## 2022-09-11 PROCEDURE — 99239 HOSP IP/OBS DSCHRG MGMT >30: CPT | Performed by: INTERNAL MEDICINE

## 2022-09-11 PROCEDURE — 84100 ASSAY OF PHOSPHORUS: CPT | Performed by: PHYSICIAN ASSISTANT

## 2022-09-11 PROCEDURE — 63710000001 DEXAMETHASONE PER 0.25 MG: Performed by: INTERNAL MEDICINE

## 2022-09-11 PROCEDURE — 83735 ASSAY OF MAGNESIUM: CPT | Performed by: PHYSICIAN ASSISTANT

## 2022-09-11 PROCEDURE — 82248 BILIRUBIN DIRECT: CPT | Performed by: INTERNAL MEDICINE

## 2022-09-11 PROCEDURE — 85007 BL SMEAR W/DIFF WBC COUNT: CPT | Performed by: PHYSICIAN ASSISTANT

## 2022-09-11 PROCEDURE — 85652 RBC SED RATE AUTOMATED: CPT | Performed by: PHYSICIAN ASSISTANT

## 2022-09-11 PROCEDURE — 82728 ASSAY OF FERRITIN: CPT | Performed by: PHYSICIAN ASSISTANT

## 2022-09-11 PROCEDURE — 82550 ASSAY OF CK (CPK): CPT | Performed by: PHYSICIAN ASSISTANT

## 2022-09-11 RX ORDER — BENZONATATE 100 MG/1
100 CAPSULE ORAL EVERY 4 HOURS PRN
Qty: 20 CAPSULE | Refills: 0 | Status: SHIPPED | OUTPATIENT
Start: 2022-09-11 | End: 2022-09-28 | Stop reason: ALTCHOICE

## 2022-09-11 RX ORDER — DEXAMETHASONE 4 MG/1
4 TABLET ORAL
Qty: 5 TABLET | Refills: 0 | Status: SHIPPED | OUTPATIENT
Start: 2022-09-11 | End: 2022-09-16

## 2022-09-11 RX ORDER — DOXYCYCLINE HYCLATE 100 MG/1
100 CAPSULE ORAL 2 TIMES DAILY
Qty: 4 CAPSULE | Refills: 0 | Status: SHIPPED | OUTPATIENT
Start: 2022-09-11 | End: 2022-09-13

## 2022-09-11 RX ORDER — GUAIFENESIN 600 MG/1
600 TABLET, EXTENDED RELEASE ORAL EVERY 12 HOURS SCHEDULED
Qty: 14 TABLET | Refills: 0 | Status: SHIPPED | OUTPATIENT
Start: 2022-09-11 | End: 2022-09-18

## 2022-09-11 RX ORDER — CEFDINIR 300 MG/1
300 CAPSULE ORAL 2 TIMES DAILY
Qty: 4 CAPSULE | Refills: 0 | Status: SHIPPED | OUTPATIENT
Start: 2022-09-11 | End: 2022-09-13

## 2022-09-11 RX ADMIN — GUAIFENESIN 600 MG: 600 TABLET, EXTENDED RELEASE ORAL at 08:58

## 2022-09-11 RX ADMIN — DOXYCYCLINE 100 MG: 100 INJECTION, POWDER, LYOPHILIZED, FOR SOLUTION INTRAVENOUS at 02:01

## 2022-09-11 RX ADMIN — DEXAMETHASONE 4 MG: 4 TABLET ORAL at 08:58

## 2022-09-11 RX ADMIN — PANTOPRAZOLE SODIUM 40 MG: 40 TABLET, DELAYED RELEASE ORAL at 05:43

## 2022-09-11 RX ADMIN — ENOXAPARIN SODIUM 40 MG: 40 INJECTION SUBCUTANEOUS at 05:02

## 2022-09-11 RX ADMIN — CEFTRIAXONE 1 G: 1 INJECTION, POWDER, FOR SOLUTION INTRAMUSCULAR; INTRAVENOUS at 05:02

## 2022-09-11 RX ADMIN — SODIUM CHLORIDE, PRESERVATIVE FREE 3 ML: 5 INJECTION INTRAVENOUS at 08:58

## 2022-09-11 NOTE — PLAN OF CARE
Goal Outcome Evaluation:  Pt has been agreeable with all interventions this shift. Pt is still on 2L NC and continuous pulse ox. Pt has no c/o pain or nausea. VSS with telemetry monitoring and IV access maintained. No s/s of acute distress noted at this time. Will continue with plan of care.

## 2022-09-11 NOTE — DISCHARGE SUMMARY
[facility] HOSPITALISTS DISCHARGE SUMMARY    Patient Identification:  Name:  Evan Salazar  Age:  67 y.o.  Sex:  male  :  1955  MRN:  [id number]  Visit Number:  [id number]    Date of Admission: 2022  Date of Discharge:  2022     PCP: Yenny Astorga APRN    DISCHARGE DIAGNOSIS:  # COVID-19 pneumonia w/superimposed bacterial pneumonia of RLL, POA  #Asymptomatic bradycardia (HR's < 40 while sleeping)  # Severe sepsis 2/2 above with HR >90, lactate 2.2, and respiratory failure, POA  # Acute hypoxic respiratory failure 2/2 above, POA  # Acute kidney injury, POA  # Acute thrombocytopenia & Lymphopenia w/ concurrent Leukocytosis w/ a left shift and Bandemia  # Elevated proBNP  # Essential hypertension  # DM2 not on chronic insulin w/ hyperglycemia  # GERD  # Severe Electrolyte disturbances (hypophosphatemia, hypomagnesemia)   # HLD  # h/o Nephrolithiasis    CONSULTS:  Consults     Date and Time Order Name Status Description    9/10/2022  3:29 PM Inpatient Cardiology Consult      2022  4:43 AM Hospitalist (on-call MD unless specified)              PROCEDURES PERFORMED:  None      HOSPITAL COURSE  Patient is a 67 y.o. male presented to [facility] complaining of   Chief Complaint   Patient presents with   • Shortness of Breath     Pt states he test postivie for covid Saturday and his having all the covid symptoms and is soa. Pt states his O2 level at home was 89%   .  Please see the admitting history and physical for further details.        - HR's sustained in the 30's while sleeping, seen on tele. Needs Sleep study to rule out VINCENZO, and event recorder to evaluate his nocturnal dickson  - will finish 5 day course of antibiotics in 2 days.   - Finished 5 day course of remdesivir, sending home with 5 more days of steroids due to continued oxygen requirement  - Requiring 2L at night, and 2L with ambulation, home o2 ordered      VITAL SIGNS:  Temp:  [98 °F (36.7 °C)] 98 °F (36.7  °C)  Heart Rate:  [54-63] 57  Resp:  [18] 18  BP: (118-123)/(47-62) 120/62  SpO2:  [77 %-98 %] 98 %  on  Flow (L/min):  [1-2] 1;   Device (Oxygen Therapy): nasal cannula    Body mass index is 24.84 kg/m².  Wt Readings from Last 3 Encounters:   09/09/22 76.3 kg (168 lb 3.4 oz)   08/29/22 72.6 kg (160 lb)   08/17/22 72.6 kg (160 lb)       PHYSICAL EXAM:  Physical Exam  Constitutional:       General: He is awake and alert     Appearance: He is normal weight.  He is well-appearing, well groomed  HENT:      Head: Normocephalic and atraumatic.      Mucous membranes moist  Eyes:      Extraocular Movements: Extraocular movements intact.      Conjunctiva/sclera: Conjunctivae normal.      Pupils: Pupils are equal, round, and reactive to light.   Cardiovascular:      Rate and Rhythm: Normal rate and regular rhythm.         Heart sounds: Normal heart sounds. No murmur heard.    No friction rub. No gallop.   Pulmonary:      Effort: Pulmonary effort is normal. No respiratory distress.      Breath sounds: Normal breath sounds.  No obvious wheezes crackles or rhonchi  Abdominal:      General: Abdomen is flat. Bowel sounds are normal. There is no distension.      Palpations: Abdomen is soft.      Tenderness: There is no abdominal tenderness. There is no guarding.   Musculoskeletal:         General: No swelling. Normal range of motion.      Cervical back: Normal range of motion and neck supple.      Right lower leg: No edema.      Left lower leg: No edema.   Skin:     General: Skin is warm and dry.      Findings: No erythema or rash.   Neurological:      General: No focal deficit present.   Psychiatric:         Mood and Affect: Mood normal.         Behavior: Behavior normal. Behavior is cooperative.            DISCHARGE DISPOSITION :  Stable    DISCHARGE MEDICATIONS:     Discharge Medications      New Medications      Instructions Start Date   benzonatate 100 MG capsule  Commonly known as: TESSALON   100 mg, Oral, Every 4 Hours PRN       cefdinir 300 MG capsule  Commonly known as: OMNICEF   300 mg, Oral, 2 Times Daily      dexamethasone 4 MG tablet  Commonly known as: DECADRON   4 mg, Oral, Daily With Breakfast      doxycycline 100 MG capsule  Commonly known as: VIBRAMYCIN   100 mg, Oral, 2 Times Daily      guaiFENesin 600 MG 12 hr tablet  Commonly known as: MUCINEX   600 mg, Oral, Every 12 Hours Scheduled         Continue These Medications      Instructions Start Date   esomeprazole 40 MG capsule  Commonly known as: nexIUM   40 mg, Oral, Daily PRN      icosapent ethyl 1 g capsule capsule  Commonly known as: VASCEPA   2 g, Oral, 2 Times Daily With Meals      metFORMIN 500 MG tablet  Commonly known as: GLUCOPHAGE   1,000 mg, Oral, 2 Times Daily      methocarbamol 750 MG tablet  Commonly known as: ROBAXIN   750 mg, Oral, 3 Times Daily PRN      naproxen 500 MG tablet  Commonly known as: NAPROSYN   500 mg, Oral, 2 Times Daily With Meals         Stop These Medications    lisinopril 5 MG tablet  Commonly known as: PRINIVIL,ZESTRIL              Additional Instructions for the Follow-ups that You Need to Schedule     Discharge Follow-up with PCP   As directed       Currently Documented PCP:    Yenny Astorga APRN    PCP Phone Number:    [phone]     Follow Up Details: COVID PNA, discharged on 02 requiring 2L w/ ambulation and at night, HR 38 while sleeping, asympomatic            Follow-up Information     Yenny Astorga APRN .    Specialty: Family Medicine  Why: COVID PNA, discharged on 02 requiring 2L w/ ambulation and at night, HR 38 while sleeping, asympomatic  Contact information:  [address] [place] [id number]  [phone]                          TEST  RESULTS PENDING AT DISCHARGE:  Pending Labs     Order Current Status    Blood Culture - Blood, Arm, Left Preliminary result    Blood Culture - Blood, Arm, Right Preliminary result           CODE STATUS:  Code Status and Medical Interventions:   Ordered at: 09/07/22 3470      Level Of Support Discussed With:    Patient     Code Status (Patient has no pulse and is not breathing):    CPR (Attempt to Resuscitate)     Medical Interventions (Patient has pulse or is breathing):    Full Support       The ASCVD Risk score (Danellededrick MARTINEZ Jr., et al., 2013) failed to calculate for the following reasons:    Cannot find a previous HDL lab    Cannot find a previous total cholesterol lab     Nicolas Stein MD  09/11/22  10:24 EDT    Please note that this discharge summary required more than 30 minutes to complete.

## 2022-09-11 NOTE — PLAN OF CARE
Goal Outcome Evaluation:Patient is being discharge home. Daniel tanesha is bringing oxygen for patient to have. Patient has been given pulse ox.

## 2022-09-11 NOTE — CONSULTS
Consults  Date of Admit: 9/7/2022  Date of Consult: 09/11/22  No ref. provider found  Evan Salazar  1955  Consulting Physician: Poncho Koehler MD    Cardiology consultation    Reason for consultation: Bradycardia  Assessment:  1. Asymptomatic bradycardia during sleeping hours      Recommendations:  1. Will order extending holter monitor to evaluate for occult bradycardia arrhythmias  2. Recommend outpatient sleep study.      History of Present Illness    Subjective     Chief Complaint   Patient presents with   • Shortness of Breath     Pt states he test postivie for covid Saturday and his having all the covid symptoms and is soa. Pt states his O2 level at home was 89%       Evan Salazar is a 67 y.o. male with past medical history significant for no known coronary artery disease, non-smoker.  He does report history of diabetes mellitus.  Mr. Salazar was originally hospitalized due to COVID-19 pneumonia and sepsis.  Thankfully he has improved clinically from this.  Cardiology services was recently consulted due to findings of bradycardia with heart rate getting as low as 38 bpm during sleeping hours.  Thankfully he has been completely asymptomatic with this denies any syncope, near syncope, falls, weakness, or significant fatigue.  He denies any known history of sleep apnea.  He did have echocardiogram during his hospitalization which showed normal LVEF with no significant valvular abnormalities.        Past Medical History:   Diagnosis Date   • Anxiety    • Arthritis    • COVID-19 09/02/2021    Received Casirivimab,Imdevimab in the ED; not hospitalized   • Epididymitis    • GERD (gastroesophageal reflux disease)    • Heartburn    • Hydrocele    • Kidney stone    • Type 2 diabetes mellitus (HCC)      Past Surgical History:   Procedure Laterality Date   • COLONOSCOPY     • CYSTOSCOPY W/ URETEROSCOPY W/ LITHOTRIPSY     • FINGER SURGERY     • HERNIA REPAIR     • SCROTAL EXPLORATION Left 6/28/2018     Procedure: SCROTAL EXPLORATION/ INGUINAL EXPLORATION HEMIORCHICETOMY WITH FROZEN SECTION.;  Surgeon: Edvin Mars MD;  Location: [facility];  Service: Urology     Family History   Problem Relation Age of Onset   • No Known Problems Father    • No Known Problems Mother      Social History     Tobacco Use   • Smoking status: Never Smoker   • Smokeless tobacco: Never Used   Vaping Use   • Vaping Use: Never used   Substance Use Topics   • Alcohol use: No   • Drug use: No     Medications Prior to Admission   Medication Sig Dispense Refill Last Dose   • esomeprazole (nexIUM) 40 MG capsule Take 40 mg by mouth Daily As Needed (heartburn/indigestion).   Past Week at Unknown time   • icosapent ethyl (VASCEPA) 1 g capsule capsule Take 2 g by mouth 2 (Two) Times a Day With Meals.   9/6/2022 at 1800   • lisinopril (PRINIVIL,ZESTRIL) 5 MG tablet Take 5 mg by mouth Daily.   9/6/2022 at 0730   • metFORMIN (GLUCOPHAGE) 500 MG tablet Take 1,000 mg by mouth 2 (Two) Times a Day.   9/6/2022 at 1800   • methocarbamol (ROBAXIN) 750 MG tablet Take 750 mg by mouth 3 (Three) Times a Day As Needed for Muscle Spasms.   Past Week at Unknown time   • naproxen (NAPROSYN) 500 MG tablet Take 1 tablet by mouth 2 (Two) Times a Day With Meals. 60 tablet 2 9/6/2022 at 1800     Allergies:  Bactrim [sulfamethoxazole-trimethoprim]      Current Facility-Administered Medications:   •  acetaminophen (TYLENOL) tablet 1,000 mg, 1,000 mg, Oral, Q6H PRN, Nicolas Stein MD, 1,000 mg at 09/08/22 0756  •  benzonatate (TESSALON) capsule 100 mg, 100 mg, Oral, Q4H PRN, Nicolas Stein MD, 100 mg at 09/08/22 0757  •  cefTRIAXone (ROCEPHIN) 1 g/100 mL 0.9% NS (MBP), 1 g, Intravenous, Q24H, Jessika Cisneros PA-C, 1 g at 09/11/22 0502  •  dextrose (D50W) (25 g/50 mL) IV injection 25 g, 25 g, Intravenous, Q15 Min PRN, Luca Dominguez MD  •  dextrose (GLUTOSE) oral gel 15 g, 15 g, Oral, Q15 Min PRN, Luca Dominguez MD  •  doxycycline  (VIBRAMYCIN) 100 mg in sodium chloride 0.9 % 100 mL IVPB-VTB, 100 mg, Intravenous, Q12H, Jessika Cisneros PA-C, 100 mg at 09/11/22 0201  •  Enoxaparin Sodium (LOVENOX) syringe 40 mg, 40 mg, Subcutaneous, Q12H, Nicolas Stein MD, 40 mg at 09/11/22 0502  •  glucagon (human recombinant) (GLUCAGEN DIAGNOSTIC) injection 1 mg, 1 mg, Subcutaneous, Q15 Min PRN, Luca Dominguez MD  •  guaiFENesin (MUCINEX) 12 hr tablet 600 mg, 600 mg, Oral, Q12H, Luca Dominguez MD, 600 mg at 09/11/22 0858  •  Insulin Aspart (novoLOG) injection 0-24 Units, 0-24 Units, Subcutaneous, TID AC, Nicolas Stein MD, 20 Units at 09/10/22 1656  •  Magnesium Sulfate 2 gram Bolus, followed by 8 gram infusion (total Mg dose 10 grams)- Mg less than or equal to 1mg/dL, 2 g, Intravenous, PRN **OR** Magnesium Sulfate 2 gram / 50mL Infusion (GIVE X 3 BAGS TO EQUAL 6GM TOTAL DOSE) - Mg 1.1 - 1.5 mg/dl, 2 g, Intravenous, PRN **OR** Magnesium Sulfate 4 gram infusion- Mg 1.6-1.9 mg/dL, 4 g, Intravenous, PRN, Luca Dominguez MD  •  methocarbamol (ROBAXIN) tablet 750 mg, 750 mg, Oral, TID PRN, Nicolas Stein MD  •  nitroglycerin (NITROSTAT) SL tablet 0.4 mg, 0.4 mg, Sublingual, Q5 Min PRN, Luca Dominguez MD  •  pantoprazole (PROTONIX) EC tablet 40 mg, 40 mg, Oral, QAM, Nicolas Stein MD, 40 mg at 09/11/22 0543  •  Pharmacy Consult - Remdesivir, , Does not apply, Continuous PRN, Luca Dominguez MD  •  phenol (CHLORASEPTIC) 1.4 % liquid 1 spray, 1 spray, Mouth/Throat, Q2H PRN, Nicolas Stein MD, 1 spray at 09/07/22 1532  •  [COMPLETED] Insert peripheral IV, , , Once **AND** sodium chloride 0.9 % flush 10 mL, 10 mL, Intravenous, PRN, Luca Dominguez MD  •  sodium chloride 0.9 % flush 3 mL, 3 mL, Intravenous, Q12H, Luca Dominguez MD, 3 mL at 09/11/22 0858  •  sodium chloride 0.9 % flush 3-10 mL, 3-10 mL, Intravenous, PRN, Luca Dominguez MD  •  sodium phosphates 21 mmol in sodium chloride 0.9 % 250 mL  IVPB, 21 mmol, Intravenous, PRN **OR** sodium phosphates 15 mmol in sodium chloride 0.9 % 250 mL IVPB, 15 mmol, Intravenous, PRN **OR** sodium phosphates 9 mmol in sodium chloride 0.9 % 250 mL IVPB, 9 mmol, Intravenous, PRN, Nicolas Stein MD  •  traZODone (DESYREL) tablet 25 mg, 25 mg, Oral, Nightly PRN, Nicolas Stein MD    Review of Systems   Constitutional: Negative for chills and diaphoresis.   HENT: Negative for congestion and nosebleeds.    Respiratory: Negative for chest tightness and shortness of breath.    Cardiovascular: Negative for chest pain and leg swelling.   Gastrointestinal: Negative for abdominal distention and abdominal pain.   Genitourinary: Negative for dysuria and hematuria.   Musculoskeletal: Negative for arthralgias and back pain.   Neurological: Negative for dizziness and light-headedness.   Hematological: Negative for adenopathy. Does not bruise/bleed easily.   Psychiatric/Behavioral: Negative for agitation and behavioral problems.         Objective      Vital Signs  Temp:  [98 °F (36.7 °C)] 98 °F (36.7 °C)  Heart Rate:  [54-63] 57  Resp:  [18] 18  BP: (118-123)/(47-62) 120/62  Body mass index is 24.84 kg/m².    Intake/Output Summary (Last 24 hours) at 9/11/2022 1036  Last data filed at 9/10/2022 1330  Gross per 24 hour   Intake 240 ml   Output --   Net 240 ml       Physical Exam  Constitutional:       Appearance: Normal appearance.   HENT:      Head: Normocephalic and atraumatic.   Cardiovascular:      Rate and Rhythm: Normal rate and regular rhythm.      Pulses: Normal pulses.      Heart sounds: Normal heart sounds.   Pulmonary:      Effort: Pulmonary effort is normal.      Breath sounds: Normal breath sounds.   Abdominal:      General: Abdomen is flat. There is no distension.   Musculoskeletal:         General: No swelling or tenderness.   Skin:     General: Skin is warm and dry.   Neurological:      General: No focal deficit present.      Mental Status: He is alert and  oriented to person, place, and time.   Psychiatric:         Mood and Affect: Mood normal.         Behavior: Behavior normal.           Results Review:   I reviewed the patient's new clinical results.  Results from last 7 days   Lab Units 09/11/22  0045 09/10/22  0431 09/09/22  0031 09/08/22  0040 09/07/22  0201   CK TOTAL U/L 39 34 40 33  --    TROPONIN T ng/mL  --   --   --   --  <0.010     Results from last 7 days   Lab Units 09/11/22  0045 09/10/22  0431 09/09/22  0031 09/08/22  0040 09/07/22  0201   WBC 10*3/mm3 11.23* 10.36 8.39 8.67 10.84*   HEMOGLOBIN g/dL 10.6* 11.0* 10.8* 11.1* 12.7*   PLATELETS 10*3/mm3 171 179 144 125* 135*     Results from last 7 days   Lab Units 09/11/22  0045 09/10/22  0431 09/09/22  0031 09/08/22  0040 09/07/22  0201   SODIUM mmol/L 134* 140 135* 136 136   POTASSIUM mmol/L 4.6 5.0 5.2 5.0 5.0   CHLORIDE mmol/L 103 106 104 104 99   CO2 mmol/L 21.8* 23.3 21.5* 22.2 23.4   BUN mg/dL 36* 33* 29* 26* 24*   CREATININE mg/dL 1.08 1.11 1.10 1.20 1.58*   CALCIUM mg/dL 8.7 9.1 8.4* 8.5* 9.1   GLUCOSE mg/dL 273* 236* 307* 170* 184*   ALT (SGPT) U/L 38 25 21 25 28   AST (SGOT) U/L 28 18 15 20 18     Lab Results   Component Value Date    INR 0.92 09/02/2021     Lab Results   Component Value Date    MG 2.0 09/11/2022    MG 1.9 09/10/2022    MG 2.2 09/09/2022     Lab Results   Component Value Date    TSH 1.331 09/01/2018    PSA 1.280 10/14/2020    TRIG 229 (H) 09/01/2018    HDL 32 (L) 09/01/2018    LDL 59 09/01/2018      No results found for: BNP    EKG:     Imaging Results (Last 72 Hours)     ** No results found for the last 72 hours. **           Thank you very much for asking us to be involved in this patient's care.  We will follow along with you.    Judson Samano PA-C   09/11/22  10:36 EDT

## 2022-09-11 NOTE — NURSING NOTE
Patient's oxygen dropped to 86 on room air while ambulating in the room. Placed 2 liters nasal canula oxygen increased to 98 percent.   MD cardenas.

## 2022-09-11 NOTE — OUTREACH NOTE
Prep Survey    Flowsheet Row Responses   [facility] facility patient discharged from? [facility]   Is LACE score < 7 ? No   Emergency Room discharge w/ pulse ox? No   Eligibility Readm Mgmt   Discharge diagnosis Covid pna, sepsis,    Does the patient have one of the following disease processes/diagnoses(primary or secondary)? COVID-19   Does the patient have Home health ordered? No   Is there a DME ordered? No   Prep survey completed? Yes          MARIOLA HOWARD - Registered Nurse

## 2022-09-11 NOTE — DISCHARGE INSTR - APPOINTMENTS
[facility]  OFFICE  CLOSED  ON  THE  WEEKEND  WILL CALL ON  Monday  AND  GET APOINTMENT  RESPIRATORY  BROUGHT  A  PULSE  OX

## 2022-09-11 NOTE — NURSING NOTE
Called [facility] for home O2 and portable for transportation. Left message with answering service to return call to unit.

## 2022-09-12 ENCOUNTER — TELEPHONE (OUTPATIENT)
Dept: TELEMETRY | Facility: HOSPITAL | Age: 67
End: 2022-09-12

## 2022-09-12 ENCOUNTER — READMISSION MANAGEMENT (OUTPATIENT)
Dept: CALL CENTER | Facility: HOSPITAL | Age: 67
End: 2022-09-12

## 2022-09-12 LAB
BACTERIA SPEC AEROBE CULT: NORMAL
BACTERIA SPEC AEROBE CULT: NORMAL

## 2022-09-12 NOTE — OUTREACH NOTE
COVID-19 Week 1 Survey    Flowsheet Row Responses   [facility] facility patient discharged from? [place]   Does the patient have one of the following disease processes/diagnoses(primary or secondary)? COVID-19   COVID-19 underlying condition? None   Call Number Call 1   Week 1 Call successful? No   Discharge diagnosis Covid pna, sepsis,           TIMMY M - Registered Nurse

## 2022-09-12 NOTE — CASE MANAGEMENT/SOCIAL WORK
Case Management Discharge Note      Final Note: Patient discharged home on 9/12/22.  Rn ordered Home oxygen from [facility].         Selected Continued Care - Discharged on 9/11/2022 Admission date: 9/7/2022 - Discharge disposition: Home or Self Care             Durable Medical Equipment Coordination complete.    Service Provider Selected Services Address Phone Fax Patient Preferred    [facility]  Durable Medical Equipment [id number] [place] [address] [phone] --              Final Discharge Disposition Code: 01 - home or self-care

## 2022-09-14 ENCOUNTER — READMISSION MANAGEMENT (OUTPATIENT)
Dept: CALL CENTER | Facility: HOSPITAL | Age: 67
End: 2022-09-14

## 2022-09-14 NOTE — OUTREACH NOTE
COVID-19 Week 1 Survey    Flowsheet Row Responses   [facility] [place] patient discharged from? [facility]   Does the patient have one of the following disease processes/diagnoses(primary or secondary)? COVID-19   COVID-19 underlying condition? Sepsis   Call Number Call 2   Week 1 Call successful? Yes   Call start time 1416   Call end time 1425   Discharge diagnosis Covid pna, sepsis   Is patient permission given to speak with other caregiver? Yes   List who call center can speak with Naye Salazar Spouse    Person spoke with today (if not patient) and relationship Salazar,Naye Spouse    Meds reviewed with patient/caregiver? Yes   Is the patient having any side effects they believe may be caused by any medication additions or changes? Yes   Side effects comments  sleeplessness and high sugar readings from steroid.    Does the patient have all medications ordered at discharge? Yes   Is the patient taking all medications as directed (includes completed medication regime)? Yes   Does the patient have a primary care provider?  Yes   Comments regarding PCP LUT[facility] Tr[facility] PCP   Does the patient have an appointment with their PCP or specialist within 7 days of discharge? No   Nursing Interventions Educated patient on importance of making appointment, Advised patient to make appointment   Has the patient kept scheduled appointments due by today? N/A   Has home health visited the patient within 72 hours of discharge? N/A   What DME was ordered? Home O2   Has all DME been delivered? Yes   DME comments O22L for sleep and with exertion   Psychosocial issues? No   Did the patient receive a copy of their discharge instructions? Yes   Did the patient receive a copy of COVID-19 specific instructions? Yes   Nursing interventions Reviewed instructions with patient   What is the patient's perception of their health status since discharge? Improving   Does the patient have any of the following symptoms? Cough   Nursing  Interventions Nurse provided patient education   Pulse Ox monitoring Intermittent   Pulse Ox device source Patient   O2 Sat comments 96-98% on room air. Wife states patient has not worn the O2. Reviewed order that patient needs to wear for sleep.    O2 Sat: education provided Sat levels, Monitoring frequency, When to seek care   Is the patient/caregiver able to teach back steps to recovery at home? Set small, achievable goals for return to baseline health, Rest and rebuild strength, gradually increase activity   If the patient is a current smoker, are they able to teach back resources for cessation? Not a smoker   Is the patient/caregiver able to teach back the hierarchy of who to call/visit for symptoms/problems? PCP, Specialist, Home health nurse, Urgent Care, ED, 911 Yes   Nursing interventions Nurse provided patient education   Is patient/caregiver able to teach back steps to recovery at home? Set small, achievable goals for return to baseline health, Rest and regain strength   Is the patient/caregiver able to teach back signs and symptoms of worsening condition: Fever, Shortness of breath/rapid respiratory rate   COVID-19 call completed? Yes          KEVIN MURILLO - Registered Nurse

## 2022-09-21 ENCOUNTER — READMISSION MANAGEMENT (OUTPATIENT)
Dept: CALL CENTER | Facility: HOSPITAL | Age: 67
End: 2022-09-21

## 2022-09-21 NOTE — OUTREACH NOTE
COVID-19 Week 2 Survey    Flowsheet Row Responses   [facility] patient discharged from? [place]   Does the patient have one of the following disease processes/diagnoses(primary or secondary)? COVID-19   COVID-19 underlying condition? Sepsis   Call Number Call 1   COVID-19 Week 2: Call 1 attempt successful? Yes   Call start time 1321   Call end time 1332   Discharge diagnosis Covid pna, sepsis   Person spoke with today (if not patient) and relationship patient   Meds reviewed with patient/caregiver? Yes   Does the patient have all medications ordered at discharge? Yes   Is the patient taking all medications as directed (includes completed medication regime)? Yes   Does the patient have a primary care provider?  Yes   Comments regarding PCP Patient reports that he has seen by PCP   Has the patient kept scheduled appointments due by today? Yes   Has home health visited the patient within 72 hours of discharge? N/A   What DME was ordered? Home O2   DME comments O22L for sleep and with exertion   Psychosocial issues? No   Comments Patient reports that he is wearing heart monitor.    Did the patient receive a copy of their discharge instructions? Yes   Nursing interventions Reviewed instructions with patient   What is the patient's perception of their health status since discharge? Improving  [patient reports still feels very tired. ]   Does the patient have any of the following symptoms? Cough  [Occasional cough. ]   Nursing Interventions Nurse provided patient education   Pulse Ox monitoring Intermittent   Pulse Ox device source Patient   O2 Sat comments Patient gives reading of 95% on room air during day. Patient has not been wearing the O2 for sleep or exertion as ordered. Reviewed order with patient.    O2 Sat: education provided Sat levels, Monitoring frequency, When to seek care   Is the patient/caregiver able to teach back steps to recovery at home? Set small, achievable goals for return to baseline health,  Rest and rebuild strength, gradually increase activity, Eat a well-balance diet   If the patient is a current smoker, are they able to teach back resources for cessation? Not a smoker   Is the patient/caregiver able to teach back the hierarchy of who to call/visit for symptoms/problems? PCP, Specialist, Home health nurse, Urgent Care, ED, 911 Yes   Is the patient/caregiver able to teach back TIME? T emperature - higher or lower than normal, I nfection - may have signs and symptoms of an infection, M ental Decline - confused, sleepy, difficult to arouse, E xtremely Ill - severe pain, discomfort, shortness of breath   Nursing interventions Nurse provided reassurance to patient, Nurse provided patient education   Is patient/caregiver able to teach back steps to recovery at home? Set small, achievable goals for return to baseline health, Rest and regain strength, Eat a balanced diet   Is the patient/caregiver able to teach back signs and symptoms of worsening condition: Fever, Shortness of breath/rapid respiratory rate, Altered mental status(confusion/coma)   COVID-19 call completed? Yes          KEVIN MURILLO - Registered Nurse

## 2022-09-28 ENCOUNTER — READMISSION MANAGEMENT (OUTPATIENT)
Dept: CALL CENTER | Facility: HOSPITAL | Age: 67
End: 2022-09-28

## 2022-09-28 ENCOUNTER — OFFICE VISIT (OUTPATIENT)
Dept: ORTHOPEDIC SURGERY | Facility: CLINIC | Age: 67
End: 2022-09-28

## 2022-09-28 VITALS — WEIGHT: 168 LBS | HEIGHT: 69 IN | BODY MASS INDEX: 24.88 KG/M2

## 2022-09-28 DIAGNOSIS — S86.112D STRAIN OF GASTROCNEMIUS MUSCLE OF LEFT LOWER EXTREMITY, SUBSEQUENT ENCOUNTER: Primary | ICD-10-CM

## 2022-09-28 PROCEDURE — 99213 OFFICE O/P EST LOW 20 MIN: CPT | Performed by: PHYSICIAN ASSISTANT

## 2022-09-28 NOTE — OUTREACH NOTE
COVID-19 Week 3 Survey    Flowsheet Row Responses   [facility] patient discharged from? [place]   Does the patient have one of the following disease processes/diagnoses(primary or secondary)? COVID-19   COVID-19 underlying condition? Sepsis   Call Number Call 1   COVID-19 Week 3: Call 1 attempt successful? No  [attempted home, cell as well as wifes number]   Discharge diagnosis Covid pna, sepsis          TIMMY LUCIO - Registered Nurse

## 2022-09-30 ENCOUNTER — TRANSCRIBE ORDERS (OUTPATIENT)
Dept: ADMINISTRATIVE | Facility: HOSPITAL | Age: 67
End: 2022-09-30

## 2022-09-30 DIAGNOSIS — J12.82 PNEUMONIA DUE TO COVID-19 VIRUS: Primary | ICD-10-CM

## 2022-09-30 DIAGNOSIS — U07.1 PNEUMONIA DUE TO COVID-19 VIRUS: Primary | ICD-10-CM

## 2023-04-04 ENCOUNTER — PATIENT ROUNDING (BHMG ONLY) (OUTPATIENT)
Dept: CARDIOLOGY | Facility: CLINIC | Age: 68
End: 2023-04-04
Payer: COMMERCIAL

## 2023-04-04 ENCOUNTER — OFFICE VISIT (OUTPATIENT)
Dept: CARDIOLOGY | Facility: CLINIC | Age: 68
End: 2023-04-04
Payer: COMMERCIAL

## 2023-04-04 VITALS
BODY MASS INDEX: 25.89 KG/M2 | DIASTOLIC BLOOD PRESSURE: 82 MMHG | OXYGEN SATURATION: 98 % | SYSTOLIC BLOOD PRESSURE: 166 MMHG | HEIGHT: 69 IN | WEIGHT: 174.8 LBS | HEART RATE: 70 BPM

## 2023-04-04 DIAGNOSIS — I10 PRIMARY HYPERTENSION: ICD-10-CM

## 2023-04-04 DIAGNOSIS — R00.1 BRADYCARDIA WITH 31-40 BEATS PER MINUTE: Primary | ICD-10-CM

## 2023-04-04 PROCEDURE — 99214 OFFICE O/P EST MOD 30 MIN: CPT | Performed by: INTERNAL MEDICINE

## 2023-04-04 PROCEDURE — 93000 ELECTROCARDIOGRAM COMPLETE: CPT | Performed by: INTERNAL MEDICINE

## 2023-04-04 RX ORDER — LISINOPRIL 2.5 MG/1
2.5 TABLET ORAL DAILY
COMMUNITY
Start: 2023-03-15 | End: 2023-04-04 | Stop reason: SDUPTHER

## 2023-04-04 RX ORDER — OMEGA-3-ACID ETHYL ESTERS 1 G/1
CAPSULE, LIQUID FILLED ORAL
COMMUNITY
Start: 2023-03-06

## 2023-04-04 RX ORDER — LISINOPRIL 5 MG/1
5 TABLET ORAL DAILY
Qty: 30 TABLET | Refills: 3 | Status: SHIPPED | OUTPATIENT
Start: 2023-04-04

## 2023-04-04 RX ORDER — ATORVASTATIN CALCIUM 10 MG/1
10 TABLET, FILM COATED ORAL
COMMUNITY
Start: 2023-03-15

## 2023-04-04 RX ORDER — MELATONIN
25 DAILY
COMMUNITY
Start: 2023-03-06

## 2023-04-04 NOTE — PROGRESS NOTES
[facility] CARDIOLOGY  [address] [facility] [place] [address]  Phone: [phone]  Fax: [phone]    04/04/2023    Chief Complaint   Patient presents with   • Establish Care     Referral for sinus bradycardia.   • Med Management     Verbally reviewed medications with patient. Patient is tolerating medications well.         History:     Evan Salazar is a 68 y.o. male presenting for  initial evaluation   Clinically stable from cardiac standpoint   Symptoms:  CP no  SOB no    Orthopnea No  Lower extremity edema no   Palpitations no   Compliant with medications yes  Claudication no      Past Medical History:   Diagnosis Date   • Anxiety    • Arthritis    • COVID-19 09/02/2021    Received Casirivimab,Imdevimab in the ED; not hospitalized   • Epididymitis    • GERD (gastroesophageal reflux disease)    • Heartburn    • Hydrocele    • Kidney stone    • Primary hypertension 4/4/2023   • Type 2 diabetes mellitus        Past Surgical History:   Procedure Laterality Date   • COLONOSCOPY     • CYSTOSCOPY W/ URETEROSCOPY W/ LITHOTRIPSY     • FINGER SURGERY     • HAND SURGERY     • HERNIA REPAIR     • SCROTAL EXPLORATION Left 06/28/2018    Procedure: SCROTAL EXPLORATION/ INGUINAL EXPLORATION HEMIORCHICETOMY WITH FROZEN SECTION.;  Surgeon: Edvin Mars MD;  Location: [facility];  Service: Urology        Past Social History:  Social History     Socioeconomic History   • Marital status:    Tobacco Use   • Smoking status: Never   • Smokeless tobacco: Never   Vaping Use   • Vaping Use: Never used   Substance and Sexual Activity   • Alcohol use: No   • Drug use: No   • Sexual activity: Yes     Partners: Female       Past Family History:  Family History   Problem Relation Age of Onset   • No Known Problems Father    • Diabetes Mother        Review of Systems:   ROS       Current Outpatient Medications   Medication Sig Dispense Refill   • atorvastatin (LIPITOR) 10 MG tablet Take 1 tablet  "by mouth every night at bedtime.     • cholecalciferol (VITAMIN D3) 25 MCG (1000 UT) tablet Take 25 tablets by mouth Daily.     • esomeprazole (nexIUM) 40 MG capsule Take 1 capsule by mouth Daily As Needed (heartburn/indigestion).     • icosapent ethyl (VASCEPA) 1 g capsule capsule Take 2 g by mouth 2 (Two) Times a Day With Meals.     • lisinopril (PRINIVIL,ZESTRIL) 5 MG tablet Take 1 tablet by mouth Daily. 30 tablet 3   • metFORMIN (GLUCOPHAGE) 500 MG tablet Take 2 tablets by mouth 2 (Two) Times a Day.     • omega-3 acid ethyl esters (LOVAZA) 1 g capsule TAKE TWO CAPSULE BY MOUTH TWICE DAILY     • methocarbamol (ROBAXIN) 750 MG tablet Take 750 mg by mouth 3 (Three) Times a Day As Needed for Muscle Spasms. (Patient not taking: Reported on 4/4/2023)     • naproxen (NAPROSYN) 500 MG tablet Take 1 tablet by mouth 2 (Two) Times a Day With Meals. (Patient not taking: Reported on 4/4/2023) 60 tablet 2     No current facility-administered medications for this visit.        Allergies   Allergen Reactions   • Bactrim [Sulfamethoxazole-Trimethoprim] Rash and Other (See Comments)     Flu like symptoms       Objective     /82 (BP Location: Left arm, Patient Position: Sitting, Cuff Size: Adult)   Pulse 70   Ht 175.3 cm (69\")   Wt 79.3 kg (174 lb 12.8 oz)   SpO2 98%   BMI 25.81 kg/m²     Physical Exam       DATA:   Results for orders placed during the hospital encounter of 09/07/22    Adult Transthoracic Echo Complete W/ Cont if Necessary Per Protocol    Interpretation Summary  · Left ventricular wall thickness is consistent with mild concentric hypertrophy.  · Left ventricular ejection fraction appears to be 66 - 70%. Left ventricular systolic function is normal.  · The cardiac chamber dimensions are normal.  · No significant valvular abnormality is present.  · There is no evidence of pericardial effusion.         ECG 12 Lead    Date/Time: 4/4/2023 3:14 PM  Performed by: Poncho Koehler MD  Authorized by: " Poncho Koehler MD   Comparison: not compared with previous ECG   Previous ECG: no previous ECG available  Rhythm: sinus rhythm  Other findings: non-specific ST-T wave changes             Lab Results   Component Value Date    CHOL 137 09/01/2018     Lab Results   Component Value Date    TRIG 229 (H) 09/01/2018     Lab Results   Component Value Date    HDL 32 (L) 09/01/2018     Lab Results   Component Value Date    LDL 59 09/01/2018       Lab Results   Component Value Date    TSH 1.331 09/01/2018               Invalid input(s): LABALBU, PROT        Assessment and Plan     Diagnosis Plan   1. Bradycardia with 31-40 beats per minute        2. Primary hypertension        His bradycardia is Sinus and occurred during sleep, no urgent need for PPM.   HTN, BP slightly elevated in office will increase Lisinopril to 5 mg po qd.         Recommended increase activity to 30 minutes of walking daily, most days of the week    Thank you for allowing me to participate in the care of Evan Salazar. Feel free to contact me directly with any further questions or concerns.          Poncho Koehler MD, FACC  Interventional Cardiology

## 2023-04-04 NOTE — PROGRESS NOTES
April 4, 2023    Hello, may I speak with Evan Salazar?    My name is Marj       [facility]  [facility] CARDIOLOGY  [address]  [place] [address] [phone].    Before we get started may I verify your date of birth? 1955    I am calling to officially welcome you to our practice and ask about your recent visit. Is this a good time to talk? yes    Tell me about your visit with us. What things went well?  visit went good       We're always looking for ways to make our patients' experiences even better. Do you have recommendations on ways we may improve?  no    Overall were you satisfied with your first visit to our practice? yes       I appreciate you taking the time to speak with me today. Is there anything else I can do for you? no      Thank you, and have a great day.

## 2023-08-21 ENCOUNTER — APPOINTMENT (OUTPATIENT)
Dept: GENERAL RADIOLOGY | Facility: HOSPITAL | Age: 68
End: 2023-08-21
Payer: COMMERCIAL

## 2023-08-21 ENCOUNTER — HOSPITAL ENCOUNTER (EMERGENCY)
Facility: HOSPITAL | Age: 68
Discharge: HOME | End: 2023-08-21
Attending: STUDENT IN AN ORGANIZED HEALTH CARE EDUCATION/TRAINING PROGRAM
Payer: COMMERCIAL

## 2023-08-21 VITALS
RESPIRATION RATE: 16 BRPM | DIASTOLIC BLOOD PRESSURE: 82 MMHG | HEART RATE: 62 BPM | SYSTOLIC BLOOD PRESSURE: 162 MMHG | TEMPERATURE: 97.8 F | WEIGHT: 165 LBS | BODY MASS INDEX: 24.44 KG/M2 | HEIGHT: 69 IN | OXYGEN SATURATION: 97 %

## 2023-08-21 DIAGNOSIS — R07.9 CHEST PAIN, UNSPECIFIED TYPE: Primary | ICD-10-CM

## 2023-08-21 LAB
ALBUMIN SERPL-MCNC: 4.5 G/DL (ref 3.5–5.2)
ALBUMIN/GLOB SERPL: 2.3 G/DL
ALP SERPL-CCNC: 78 U/L (ref 39–117)
ALT SERPL W P-5'-P-CCNC: 27 U/L (ref 1–41)
ANION GAP SERPL CALCULATED.3IONS-SCNC: 13.3 MMOL/L (ref 5–15)
AST SERPL-CCNC: 19 U/L (ref 1–40)
BASOPHILS # BLD AUTO: 0.06 10*3/MM3 (ref 0–0.2)
BASOPHILS NFR BLD AUTO: 0.8 % (ref 0–1.5)
BILIRUB SERPL-MCNC: 0.6 MG/DL (ref 0–1.2)
BUN SERPL-MCNC: 23 MG/DL (ref 8–23)
BUN/CREAT SERPL: 19.7 (ref 7–25)
CALCIUM SPEC-SCNC: 9.2 MG/DL (ref 8.6–10.5)
CHLORIDE SERPL-SCNC: 102 MMOL/L (ref 98–107)
CO2 SERPL-SCNC: 22.7 MMOL/L (ref 22–29)
CREAT SERPL-MCNC: 1.17 MG/DL (ref 0.76–1.27)
CRP SERPL-MCNC: <0.3 MG/DL (ref 0–0.5)
DEPRECATED RDW RBC AUTO: 40.9 FL (ref 37–54)
EGFRCR SERPLBLD CKD-EPI 2021: 68.3 ML/MIN/1.73
EOSINOPHIL # BLD AUTO: 0.24 10*3/MM3 (ref 0–0.4)
EOSINOPHIL NFR BLD AUTO: 3.4 % (ref 0.3–6.2)
ERYTHROCYTE [DISTWIDTH] IN BLOOD BY AUTOMATED COUNT: 12.6 % (ref 12.3–15.4)
GEN 5 2HR TROPONIN T REFLEX: 7 NG/L
GLOBULIN UR ELPH-MCNC: 2 GM/DL
GLUCOSE SERPL-MCNC: 185 MG/DL (ref 65–99)
HCT VFR BLD AUTO: 37.7 % (ref 37.5–51)
HGB BLD-MCNC: 12.4 G/DL (ref 13–17.7)
HOLD SPECIMEN: NORMAL
HOLD SPECIMEN: NORMAL
IMM GRANULOCYTES # BLD AUTO: 0.04 10*3/MM3 (ref 0–0.05)
IMM GRANULOCYTES NFR BLD AUTO: 0.6 % (ref 0–0.5)
LYMPHOCYTES # BLD AUTO: 1.62 10*3/MM3 (ref 0.7–3.1)
LYMPHOCYTES NFR BLD AUTO: 22.8 % (ref 19.6–45.3)
MAGNESIUM SERPL-MCNC: 2 MG/DL (ref 1.6–2.4)
MCH RBC QN AUTO: 29.2 PG (ref 26.6–33)
MCHC RBC AUTO-ENTMCNC: 32.9 G/DL (ref 31.5–35.7)
MCV RBC AUTO: 88.7 FL (ref 79–97)
MONOCYTES # BLD AUTO: 0.54 10*3/MM3 (ref 0.1–0.9)
MONOCYTES NFR BLD AUTO: 7.6 % (ref 5–12)
NEUTROPHILS NFR BLD AUTO: 4.6 10*3/MM3 (ref 1.7–7)
NEUTROPHILS NFR BLD AUTO: 64.8 % (ref 42.7–76)
NRBC BLD AUTO-RTO: 0 /100 WBC (ref 0–0.2)
PLATELET # BLD AUTO: 168 10*3/MM3 (ref 140–450)
PMV BLD AUTO: 9.9 FL (ref 6–12)
POTASSIUM SERPL-SCNC: 4.2 MMOL/L (ref 3.5–5.2)
PROT SERPL-MCNC: 6.5 G/DL (ref 6–8.5)
QT INTERVAL: 368 MS
QTC INTERVAL: 376 MS
RBC # BLD AUTO: 4.25 10*6/MM3 (ref 4.14–5.8)
SODIUM SERPL-SCNC: 138 MMOL/L (ref 136–145)
TROPONIN T DELTA: NORMAL
TROPONIN T SERPL HS-MCNC: <6 NG/L
TSH SERPL DL<=0.05 MIU/L-ACNC: 1.17 UIU/ML (ref 0.27–4.2)
WBC NRBC COR # BLD: 7.1 10*3/MM3 (ref 3.4–10.8)
WHOLE BLOOD HOLD COAG: NORMAL
WHOLE BLOOD HOLD SPECIMEN: NORMAL

## 2023-08-21 PROCEDURE — 36415 COLL VENOUS BLD VENIPUNCTURE: CPT

## 2023-08-21 PROCEDURE — 71045 X-RAY EXAM CHEST 1 VIEW: CPT | Performed by: RADIOLOGY

## 2023-08-21 PROCEDURE — 93005 ELECTROCARDIOGRAM TRACING: CPT | Performed by: PHYSICIAN ASSISTANT

## 2023-08-21 PROCEDURE — 80050 GENERAL HEALTH PANEL: CPT | Performed by: PHYSICIAN ASSISTANT

## 2023-08-21 PROCEDURE — 71045 X-RAY EXAM CHEST 1 VIEW: CPT

## 2023-08-21 PROCEDURE — 99284 EMERGENCY DEPT VISIT MOD MDM: CPT

## 2023-08-21 PROCEDURE — 83735 ASSAY OF MAGNESIUM: CPT | Performed by: PHYSICIAN ASSISTANT

## 2023-08-21 PROCEDURE — 84484 ASSAY OF TROPONIN QUANT: CPT | Performed by: PHYSICIAN ASSISTANT

## 2023-08-21 PROCEDURE — 86140 C-REACTIVE PROTEIN: CPT | Performed by: PHYSICIAN ASSISTANT

## 2023-08-21 RX ORDER — ASPIRIN 81 MG/1
324 TABLET, CHEWABLE ORAL ONCE
Status: COMPLETED | OUTPATIENT
Start: 2023-08-21 | End: 2023-08-21

## 2023-08-21 RX ORDER — SODIUM CHLORIDE 0.9 % (FLUSH) 0.9 %
10 SYRINGE (ML) INJECTION AS NEEDED
Status: DISCONTINUED | OUTPATIENT
Start: 2023-08-21 | End: 2023-08-21 | Stop reason: HOSPADM

## 2023-08-21 RX ADMIN — ASPIRIN 324 MG: 81 TABLET, CHEWABLE ORAL at 15:09

## 2023-08-21 NOTE — ED PROVIDER NOTES
Subjective   History of Present Illness  68-year-old male with past medical history of type 2 diabetes, hypertension, nephrolithiasis, GERD, epididymitis, COVID-19, arthritis, and anxiety presents to the emergency room with chest pain which she states began 3 days ago.  Patient states pain began on Friday evening and was radiating up and down his left arm retrosternally.  He states on Saturday the pain had subsided then on Sunday pain came back and his left arm.  He states this morning he woke up and the pain continues to be in his left arm.  He denies any chest pressure, tightness, or squeezing, shortness of breath, nausea, vomiting, abdominal pain, or back pain.  He denies any specific aggravating or alleviating factors.  Denies any recent illness.  Denies any other complaints or concerns at this time.    History provided by:  Patient   used: No      Review of Systems   Constitutional: Negative.  Negative for fever.   HENT: Negative.     Respiratory: Negative.     Cardiovascular:  Positive for chest pain.   Gastrointestinal: Negative.  Negative for abdominal pain.   Endocrine: Negative.    Genitourinary: Negative.  Negative for dysuria.   Skin: Negative.    Neurological: Negative.    Psychiatric/Behavioral: Negative.     All other systems reviewed and are negative.    Past Medical History:   Diagnosis Date    Anxiety     Arthritis     COVID-19 09/02/2021    Received Casirivimab,Imdevimab in the ED; not hospitalized    Epididymitis     GERD (gastroesophageal reflux disease)     Heartburn     Hydrocele     Kidney stone     Primary hypertension 4/4/2023    Type 2 diabetes mellitus        Allergies   Allergen Reactions    Bactrim [Sulfamethoxazole-Trimethoprim] Rash and Other (See Comments)     Flu like symptoms       Past Surgical History:   Procedure Laterality Date    COLONOSCOPY      CYSTOSCOPY W/ URETEROSCOPY W/ LITHOTRIPSY      FINGER SURGERY      HAND SURGERY      HERNIA REPAIR      SCROTAL  EXPLORATION Left 06/28/2018    Procedure: SCROTAL EXPLORATION/ INGUINAL EXPLORATION HEMIORCHICETOMY WITH FROZEN SECTION.;  Surgeon: Edvin Mars MD;  Location: [facility];  Service: Urology       Family History   Problem Relation Age of Onset    No Known Problems Father     Diabetes Mother        Social History     Socioeconomic History    Marital status:    Tobacco Use    Smoking status: Never    Smokeless tobacco: Never   Vaping Use    Vaping Use: Never used   Substance and Sexual Activity    Alcohol use: No    Drug use: No    Sexual activity: Yes     Partners: Female           Objective   Physical Exam  Vitals and nursing note reviewed.   Constitutional:       General: He is not in acute distress.     Appearance: He is well-developed. He is not diaphoretic.   HENT:      Head: Normocephalic and atraumatic.      Right Ear: External ear normal.      Left Ear: External ear normal.      Nose: Nose normal.   Eyes:      Conjunctiva/sclera: Conjunctivae normal.      Pupils: Pupils are equal, round, and reactive to light.   Neck:      Vascular: No JVD.      Trachea: No tracheal deviation.   Cardiovascular:      Rate and Rhythm: Normal rate and regular rhythm.      Heart sounds: Normal heart sounds. No murmur heard.  Pulmonary:      Effort: Pulmonary effort is normal. No respiratory distress.      Breath sounds: Normal breath sounds. No wheezing.   Abdominal:      General: Bowel sounds are normal.      Palpations: Abdomen is soft.      Tenderness: There is no abdominal tenderness.   Musculoskeletal:         General: No deformity. Normal range of motion.      Cervical back: Normal range of motion and neck supple.   Skin:     General: Skin is warm and dry.      Coloration: Skin is not pale.      Findings: No erythema or rash.   Neurological:      Mental Status: He is alert and oriented to person, place, and time.      Cranial Nerves: No cranial nerve deficit.   Psychiatric:         Behavior: Behavior normal.          Thought Content: Thought content normal.       Procedures           ED Course  ED Course as of 08/21/23 1747   Mon Aug 21, 2023   1434 eCG 14:15 NSR, rate 63. Normal eCG. qT/qTc 368/376 [GIA]   1540 HS Troponin T: <6 [TK]   1540 XR Chest 1 View [TK]      ED Course User Index  [GIA] Espinoza Morley MD  [TK] John Bowen PA-C      HEART SCORE: 3    Results for orders placed or performed during the hospital encounter of 08/21/23   Comprehensive Metabolic Panel    Specimen: Arm, Left; Blood   Result Value Ref Range    Glucose 185 (H) 65 - 99 mg/dL    BUN 23 8 - 23 mg/dL    Creatinine 1.17 0.76 - 1.27 mg/dL    Sodium 138 136 - 145 mmol/L    Potassium 4.2 3.5 - 5.2 mmol/L    Chloride 102 98 - 107 mmol/L    CO2 22.7 22.0 - 29.0 mmol/L    Calcium 9.2 8.6 - 10.5 mg/dL    Total Protein 6.5 6.0 - 8.5 g/dL    Albumin 4.5 3.5 - 5.2 g/dL    ALT (SGPT) 27 1 - 41 U/L    AST (SGOT) 19 1 - 40 U/L    Alkaline Phosphatase 78 39 - 117 U/L    Total Bilirubin 0.6 0.0 - 1.2 mg/dL    Globulin 2.0 gm/dL    A/G Ratio 2.3 g/dL    BUN/Creatinine Ratio 19.7 7.0 - 25.0    Anion Gap 13.3 5.0 - 15.0 mmol/L    eGFR 68.3 >60.0 mL/min/1.73   High Sensitivity Troponin T    Specimen: Arm, Left; Blood   Result Value Ref Range    HS Troponin T <6 <15 ng/L   CBC Auto Differential    Specimen: Arm, Left; Blood   Result Value Ref Range    WBC 7.10 3.40 - 10.80 10*3/mm3    RBC 4.25 4.14 - 5.80 10*6/mm3    Hemoglobin 12.4 (L) 13.0 - 17.7 g/dL    Hematocrit 37.7 37.5 - 51.0 %    MCV 88.7 79.0 - 97.0 fL    MCH 29.2 26.6 - 33.0 pg    MCHC 32.9 31.5 - 35.7 g/dL    RDW 12.6 12.3 - 15.4 %    RDW-SD 40.9 37.0 - 54.0 fl    MPV 9.9 6.0 - 12.0 fL    Platelets 168 140 - 450 10*3/mm3    Neutrophil % 64.8 42.7 - 76.0 %    Lymphocyte % 22.8 19.6 - 45.3 %    Monocyte % 7.6 5.0 - 12.0 %    Eosinophil % 3.4 0.3 - 6.2 %    Basophil % 0.8 0.0 - 1.5 %    Immature Grans % 0.6 (H) 0.0 - 0.5 %    Neutrophils, Absolute 4.60 1.70 - 7.00 10*3/mm3    Lymphocytes, Absolute  1.62 0.70 - 3.10 10*3/mm3    Monocytes, Absolute 0.54 0.10 - 0.90 10*3/mm3    Eosinophils, Absolute 0.24 0.00 - 0.40 10*3/mm3    Basophils, Absolute 0.06 0.00 - 0.20 10*3/mm3    Immature Grans, Absolute 0.04 0.00 - 0.05 10*3/mm3    nRBC 0.0 0.0 - 0.2 /100 WBC   TSH    Specimen: Arm, Left; Blood   Result Value Ref Range    TSH 1.170 0.270 - 4.200 uIU/mL   Magnesium    Specimen: Arm, Left; Blood   Result Value Ref Range    Magnesium 2.0 1.6 - 2.4 mg/dL   C-reactive Protein    Specimen: Arm, Left; Blood   Result Value Ref Range    C-Reactive Protein <0.30 0.00 - 0.50 mg/dL   High Sensitivity Troponin T 2Hr    Specimen: Arm, Left; Blood   Result Value Ref Range    HS Troponin T 7 <15 ng/L    Troponin T Delta     ECG 12 Lead Chest Pain   Result Value Ref Range    QT Interval 368 ms    QTC Interval 376 ms   Green Top (Gel)   Result Value Ref Range    Extra Tube Hold for add-ons.    Lavender Top   Result Value Ref Range    Extra Tube hold for add-on    Gold Top - SST   Result Value Ref Range    Extra Tube Hold for add-ons.    Light Blue Top   Result Value Ref Range    Extra Tube Hold for add-ons.        XR Chest 1 View   Final Result   No radiographic evidence of acute cardiac or pulmonary disease.       This report was finalized on 8/21/2023 3:18 PM by Dr. Bam White MD.                                                   Medical Decision Making  68-year-old male with past medical history of type 2 diabetes, hypertension, nephrolithiasis, GERD, epididymitis, COVID-19, arthritis, and anxiety presents to the emergency room with chest pain which she states began 3 days ago.  Patient states pain began on Friday evening and was radiating up and down his left arm retrosternally.  He states on Saturday the pain had subsided then on Sunday pain came back and his left arm.  He states this morning he woke up and the pain continues to be in his left arm.  He denies any chest pressure, tightness, or squeezing, shortness of breath,  nausea, vomiting, abdominal pain, or back pain.  He denies any specific aggravating or alleviating factors.  Denies any recent illness.  Denies any other complaints or concerns at this time.      Amount and/or Complexity of Data Reviewed  Labs: ordered. Decision-making details documented in ED Course.  Radiology: ordered. Decision-making details documented in ED Course.    Risk  Prescription drug management.        Final diagnoses:   Chest pain, unspecified type       ED Disposition  ED Disposition       ED Disposition   Discharge    Condition   Stable    Comment   --               Sharon Dunn,   [address] [address] [phone]    In 2 days           Medication List      No changes were made to your prescriptions during this visit.            John Bowen PA-C  08/21/23 9018

## 2023-10-02 RX ORDER — LISINOPRIL 5 MG/1
5 TABLET ORAL DAILY
Qty: 30 TABLET | Refills: 3 | Status: SHIPPED | OUTPATIENT
Start: 2023-10-02

## 2023-10-04 ENCOUNTER — OFFICE VISIT (OUTPATIENT)
Dept: CARDIOLOGY | Facility: CLINIC | Age: 68
End: 2023-10-04
Payer: COMMERCIAL

## 2023-10-04 VITALS
HEIGHT: 69 IN | HEART RATE: 67 BPM | OXYGEN SATURATION: 98 % | BODY MASS INDEX: 25.24 KG/M2 | SYSTOLIC BLOOD PRESSURE: 116 MMHG | WEIGHT: 170.4 LBS | DIASTOLIC BLOOD PRESSURE: 74 MMHG

## 2023-10-04 DIAGNOSIS — I10 PRIMARY HYPERTENSION: ICD-10-CM

## 2023-10-04 DIAGNOSIS — R00.1 BRADYCARDIA WITH 31-40 BEATS PER MINUTE: Primary | ICD-10-CM

## 2023-10-04 PROCEDURE — 99214 OFFICE O/P EST MOD 30 MIN: CPT | Performed by: INTERNAL MEDICINE

## 2023-10-04 NOTE — PROGRESS NOTES
[facility] CARDIOLOGY  [address] [place] [address]  Phone: [phone]  Fax: [phone]    10/04/2023    Chief Complaint   Patient presents with    Slow Heart Rate     Patient states doing well, denies chest pain or soa today         History:     Evan Salazar is a 68 y.o. male presenting for  follow-up evaluation   Clinically stable from cardiac standpoint   Symptoms:  CP no  SOB no    Orthopnea No  Lower extremity edema no   Palpitations no   Compliant with medications yes  Claudication no      Past Medical History:   Diagnosis Date    Anxiety     Arthritis     COVID-19 09/02/2021    Received Casirivimab,Imdevimab in the ED; not hospitalized    Epididymitis     GERD (gastroesophageal reflux disease)     Heartburn     Hydrocele     Kidney stone     Primary hypertension 4/4/2023    Type 2 diabetes mellitus        Past Surgical History:   Procedure Laterality Date    COLONOSCOPY      CYSTOSCOPY W/ URETEROSCOPY W/ LITHOTRIPSY      FINGER SURGERY      HAND SURGERY      HERNIA REPAIR      SCROTAL EXPLORATION Left 06/28/2018    Procedure: SCROTAL EXPLORATION/ INGUINAL EXPLORATION HEMIORCHICETOMY WITH FROZEN SECTION.;  Surgeon: Edvin Mars MD;  Location: [facility];  Service: Urology        Past Social History:  Social History     Socioeconomic History    Marital status:    Tobacco Use    Smoking status: Never    Smokeless tobacco: Never   Vaping Use    Vaping Use: Never used   Substance and Sexual Activity    Alcohol use: No    Drug use: No    Sexual activity: Yes     Partners: Female       Past Family History:  Family History   Problem Relation Age of Onset    No Known Problems Father     Diabetes Mother        Review of Systems:   ROS       Current Outpatient Medications   Medication Sig Dispense Refill    atorvastatin (LIPITOR) 10 MG tablet Take 1 tablet by mouth every night at bedtime.      cholecalciferol (VITAMIN D3) 25 MCG (1000 UT) tablet Take 25 tablets  "by mouth Daily.      esomeprazole (nexIUM) 40 MG capsule Take 1 capsule by mouth Daily As Needed (heartburn/indigestion).      lisinopril (PRINIVIL,ZESTRIL) 5 MG tablet TAKE 1 TABLET BY MOUTH DAILY 30 tablet 3    metFORMIN (GLUCOPHAGE) 500 MG tablet Take 2 tablets by mouth 2 (Two) Times a Day.      omega-3 acid ethyl esters (LOVAZA) 1 g capsule TAKE TWO CAPSULE BY MOUTH TWICE DAILY      icosapent ethyl (VASCEPA) 1 g capsule capsule Take 2 g by mouth 2 (Two) Times a Day With Meals. (Patient not taking: Reported on 10/4/2023)      methocarbamol (ROBAXIN) 750 MG tablet Take 750 mg by mouth 3 (Three) Times a Day As Needed for Muscle Spasms. (Patient not taking: Reported on 4/4/2023)      naproxen (NAPROSYN) 500 MG tablet Take 1 tablet by mouth 2 (Two) Times a Day With Meals. (Patient not taking: Reported on 4/4/2023) 60 tablet 2     No current facility-administered medications for this visit.        Allergies   Allergen Reactions    Bactrim [Sulfamethoxazole-Trimethoprim] Rash and Other (See Comments)     Flu like symptoms       Objective     /74 (BP Location: Left arm, Patient Position: Sitting, Cuff Size: Adult)   Pulse 67   Ht 175.3 cm (69\")   Wt 77.3 kg (170 lb 6.4 oz)   SpO2 98%   BMI 25.16 kg/mý     Physical Exam       DATA:   Results for orders placed during the hospital encounter of 09/07/22    Adult Transthoracic Echo Complete W/ Cont if Necessary Per Protocol    Interpretation Summary  ú Left ventricular wall thickness is consistent with mild concentric hypertrophy.  ú Left ventricular ejection fraction appears to be 66 - 70%. Left ventricular systolic function is normal.  ú The cardiac chamber dimensions are normal.  ú No significant valvular abnormality is present.  ú There is no evidence of pericardial effusion.       Procedures     Lab Results   Component Value Date    CHOL 137 09/01/2018     Lab Results   Component Value Date    TRIG 229 (H) 09/01/2018     Lab Results   Component Value Date    " "HDL 32 (L) 09/01/2018     Lab Results   Component Value Date    LDL 59 09/01/2018       Lab Results   Component Value Date    TSH 1.170 08/21/2023               Invalid input(s): \"LABALBU\", \"PROT\"        Assessment and Plan    Assessment and Plan    Problem List Items Addressed This Visit          Cardiac and Vasculature    Bradycardia with 31-40 beats per minute - Primary    Primary hypertension           Recommended increase activity to 30 minutes of walking daily, most days of the week    Thank you for allowing me to participate in the care of Evan Salazar. Feel free to contact me directly with any further questions or concerns.          Poncho Koehler MD, FACC  Interventional Cardiology    "

## 2023-11-28 ENCOUNTER — APPOINTMENT (OUTPATIENT)
Dept: ULTRASOUND IMAGING | Facility: HOSPITAL | Age: 68
DRG: 443 | End: 2023-11-28
Payer: MEDICARE

## 2023-11-28 ENCOUNTER — HOSPITAL ENCOUNTER (INPATIENT)
Facility: HOSPITAL | Age: 68
LOS: 1 days | Discharge: ANOTHER HOSPITAL | DRG: 443 | End: 2023-12-01
Attending: EMERGENCY MEDICINE | Admitting: INTERNAL MEDICINE
Payer: MEDICARE

## 2023-11-28 ENCOUNTER — APPOINTMENT (OUTPATIENT)
Dept: CT IMAGING | Facility: HOSPITAL | Age: 68
DRG: 443 | End: 2023-11-28
Payer: MEDICARE

## 2023-11-28 DIAGNOSIS — R10.11 RIGHT UPPER QUADRANT ABDOMINAL PAIN: Primary | ICD-10-CM

## 2023-11-28 PROBLEM — E55.9 VITAMIN D DEFICIENCY: Status: ACTIVE | Noted: 2023-11-28

## 2023-11-28 PROBLEM — B17.9 ACUTE HEPATITIS: Status: ACTIVE | Noted: 2023-11-28

## 2023-11-28 PROBLEM — N40.0 BENIGN FIBROMA OF PROSTATE: Status: ACTIVE | Noted: 2023-11-28

## 2023-11-28 PROBLEM — E78.2 MIXED HYPERLIPIDEMIA: Status: ACTIVE | Noted: 2023-11-28

## 2023-11-28 LAB
ALBUMIN SERPL-MCNC: 4.4 G/DL (ref 3.5–5.2)
ALBUMIN/GLOB SERPL: 1.9 G/DL
ALP SERPL-CCNC: 144 U/L (ref 39–117)
ALT SERPL W P-5'-P-CCNC: 386 U/L (ref 1–41)
ANION GAP SERPL CALCULATED.3IONS-SCNC: 11.1 MMOL/L (ref 5–15)
AST SERPL-CCNC: 402 U/L (ref 1–40)
BASOPHILS # BLD AUTO: 0.03 10*3/MM3 (ref 0–0.2)
BASOPHILS NFR BLD AUTO: 0.4 % (ref 0–1.5)
BILIRUB CONJ SERPL-MCNC: 1.8 MG/DL (ref 0–0.3)
BILIRUB SERPL-MCNC: 3.1 MG/DL (ref 0–1.2)
BILIRUB UR QL STRIP: ABNORMAL
BUN SERPL-MCNC: 20 MG/DL (ref 8–23)
BUN/CREAT SERPL: 16.1 (ref 7–25)
CALCIUM SPEC-SCNC: 9.3 MG/DL (ref 8.6–10.5)
CHLORIDE SERPL-SCNC: 104 MMOL/L (ref 98–107)
CLARITY UR: CLEAR
CO2 SERPL-SCNC: 23.9 MMOL/L (ref 22–29)
COLOR UR: ABNORMAL
CREAT SERPL-MCNC: 1.24 MG/DL (ref 0.76–1.27)
D-LACTATE SERPL-SCNC: 1.8 MMOL/L (ref 0.5–2)
D-LACTATE SERPL-SCNC: 2.1 MMOL/L (ref 0.5–2)
DEPRECATED RDW RBC AUTO: 41 FL (ref 37–54)
EGFRCR SERPLBLD CKD-EPI 2021: 63.7 ML/MIN/1.73
EOSINOPHIL # BLD AUTO: 0.03 10*3/MM3 (ref 0–0.4)
EOSINOPHIL NFR BLD AUTO: 0.4 % (ref 0.3–6.2)
ERYTHROCYTE [DISTWIDTH] IN BLOOD BY AUTOMATED COUNT: 12.8 % (ref 12.3–15.4)
GLOBULIN UR ELPH-MCNC: 2.3 GM/DL
GLUCOSE SERPL-MCNC: 139 MG/DL (ref 65–99)
GLUCOSE UR STRIP-MCNC: NEGATIVE MG/DL
HAV IGM SERPL QL IA: NORMAL
HBV CORE IGM SERPL QL IA: NORMAL
HBV SURFACE AG SERPL QL IA: NORMAL
HCT VFR BLD AUTO: 38.9 % (ref 37.5–51)
HCV AB SER DONR QL: NORMAL
HGB BLD-MCNC: 13 G/DL (ref 13–17.7)
HGB UR QL STRIP.AUTO: NEGATIVE
HOLD SPECIMEN: NORMAL
HOLD SPECIMEN: NORMAL
IMM GRANULOCYTES # BLD AUTO: 0.02 10*3/MM3 (ref 0–0.05)
IMM GRANULOCYTES NFR BLD AUTO: 0.3 % (ref 0–0.5)
KETONES UR QL STRIP: ABNORMAL
LEUKOCYTE ESTERASE UR QL STRIP.AUTO: NEGATIVE
LIPASE SERPL-CCNC: 27 U/L (ref 13–60)
LYMPHOCYTES # BLD AUTO: 0.66 10*3/MM3 (ref 0.7–3.1)
LYMPHOCYTES NFR BLD AUTO: 8.3 % (ref 19.6–45.3)
MCH RBC QN AUTO: 29.3 PG (ref 26.6–33)
MCHC RBC AUTO-ENTMCNC: 33.4 G/DL (ref 31.5–35.7)
MCV RBC AUTO: 87.8 FL (ref 79–97)
MONOCYTES # BLD AUTO: 0.67 10*3/MM3 (ref 0.1–0.9)
MONOCYTES NFR BLD AUTO: 8.4 % (ref 5–12)
NEUTROPHILS NFR BLD AUTO: 6.58 10*3/MM3 (ref 1.7–7)
NEUTROPHILS NFR BLD AUTO: 82.2 % (ref 42.7–76)
NITRITE UR QL STRIP: NEGATIVE
NRBC BLD AUTO-RTO: 0 /100 WBC (ref 0–0.2)
PH UR STRIP.AUTO: 7 [PH] (ref 5–8)
PLATELET # BLD AUTO: 181 10*3/MM3 (ref 140–450)
PMV BLD AUTO: 9.8 FL (ref 6–12)
POTASSIUM SERPL-SCNC: 4.4 MMOL/L (ref 3.5–5.2)
PROT SERPL-MCNC: 6.7 G/DL (ref 6–8.5)
PROT UR QL STRIP: ABNORMAL
RBC # BLD AUTO: 4.43 10*6/MM3 (ref 4.14–5.8)
SODIUM SERPL-SCNC: 139 MMOL/L (ref 136–145)
SP GR UR STRIP: 1.02 (ref 1–1.03)
TROPONIN T SERPL HS-MCNC: 9 NG/L
UROBILINOGEN UR QL STRIP: ABNORMAL
WBC NRBC COR # BLD AUTO: 7.99 10*3/MM3 (ref 3.4–10.8)
WHOLE BLOOD HOLD COAG: NORMAL
WHOLE BLOOD HOLD SPECIMEN: NORMAL

## 2023-11-28 PROCEDURE — 76705 ECHO EXAM OF ABDOMEN: CPT

## 2023-11-28 PROCEDURE — 85610 PROTHROMBIN TIME: CPT | Performed by: INTERNAL MEDICINE

## 2023-11-28 PROCEDURE — 82248 BILIRUBIN DIRECT: CPT | Performed by: INTERNAL MEDICINE

## 2023-11-28 PROCEDURE — 93005 ELECTROCARDIOGRAM TRACING: CPT | Performed by: EMERGENCY MEDICINE

## 2023-11-28 PROCEDURE — 99285 EMERGENCY DEPT VISIT HI MDM: CPT

## 2023-11-28 PROCEDURE — 25810000003 LACTATED RINGERS SOLUTION: Performed by: EMERGENCY MEDICINE

## 2023-11-28 PROCEDURE — 80074 ACUTE HEPATITIS PANEL: CPT | Performed by: EMERGENCY MEDICINE

## 2023-11-28 PROCEDURE — 76705 ECHO EXAM OF ABDOMEN: CPT | Performed by: RADIOLOGY

## 2023-11-28 PROCEDURE — 36415 COLL VENOUS BLD VENIPUNCTURE: CPT

## 2023-11-28 PROCEDURE — 25510000001 IOPAMIDOL 61 % SOLUTION: Performed by: EMERGENCY MEDICINE

## 2023-11-28 PROCEDURE — 81003 URINALYSIS AUTO W/O SCOPE: CPT | Performed by: EMERGENCY MEDICINE

## 2023-11-28 PROCEDURE — 80053 COMPREHEN METABOLIC PANEL: CPT | Performed by: EMERGENCY MEDICINE

## 2023-11-28 PROCEDURE — 85025 COMPLETE CBC W/AUTO DIFF WBC: CPT | Performed by: EMERGENCY MEDICINE

## 2023-11-28 PROCEDURE — 25010000002 MORPHINE PER 10 MG: Performed by: EMERGENCY MEDICINE

## 2023-11-28 PROCEDURE — 84484 ASSAY OF TROPONIN QUANT: CPT | Performed by: INTERNAL MEDICINE

## 2023-11-28 PROCEDURE — 83605 ASSAY OF LACTIC ACID: CPT | Performed by: EMERGENCY MEDICINE

## 2023-11-28 PROCEDURE — 25010000002 ONDANSETRON PER 1 MG: Performed by: EMERGENCY MEDICINE

## 2023-11-28 PROCEDURE — 74177 CT ABD & PELVIS W/CONTRAST: CPT

## 2023-11-28 PROCEDURE — 74177 CT ABD & PELVIS W/CONTRAST: CPT | Performed by: RADIOLOGY

## 2023-11-28 PROCEDURE — 83690 ASSAY OF LIPASE: CPT | Performed by: EMERGENCY MEDICINE

## 2023-11-28 RX ORDER — ONDANSETRON 2 MG/ML
4 INJECTION INTRAMUSCULAR; INTRAVENOUS ONCE
Status: COMPLETED | OUTPATIENT
Start: 2023-11-28 | End: 2023-11-28

## 2023-11-28 RX ORDER — SODIUM CHLORIDE 0.9 % (FLUSH) 0.9 %
10 SYRINGE (ML) INJECTION AS NEEDED
Status: DISCONTINUED | OUTPATIENT
Start: 2023-11-28 | End: 2023-12-01 | Stop reason: HOSPADM

## 2023-11-28 RX ADMIN — ONDANSETRON 4 MG: 2 INJECTION INTRAMUSCULAR; INTRAVENOUS at 21:41

## 2023-11-28 RX ADMIN — SODIUM CHLORIDE, POTASSIUM CHLORIDE, SODIUM LACTATE AND CALCIUM CHLORIDE 1000 ML: 600; 310; 30; 20 INJECTION, SOLUTION INTRAVENOUS at 18:57

## 2023-11-28 RX ADMIN — MORPHINE SULFATE 4 MG: 4 INJECTION, SOLUTION INTRAMUSCULAR; INTRAVENOUS at 21:41

## 2023-11-28 RX ADMIN — IOPAMIDOL 100 ML: 612 INJECTION, SOLUTION INTRAVENOUS at 19:12

## 2023-11-28 NOTE — ED PROVIDER NOTES
Subjective   History of Present Illness  68-year-old male, history of anxiety, reflux, history of kidney stones, hypertension and type 2 diabetes who presents today for evaluation of right-sided abdominal pain.  Patient's chief complaint is my gallbladder is acting up.  He states he had similar symptoms a month ago was not seen for it, and today, he woke up, start having pain in his right upper quadrant relatively soon after awakening.  Not associated with food.  No pain last night, he said he did feel slightly nauseous this morning with the pain.  The pain is 10 out of 10 in severity.  No radiation into the back.  He does have dark urine and does have a history of kidney stones however states that the pain is somewhat different.  No chest pain, no shortness of breath, no diaphoresis, patient has had a left inguinal hernia repair, otherwise no intra-abdominal surgical procedures in the past.        Review of Systems   All other systems reviewed and are negative.      Past Medical History:   Diagnosis Date    Anxiety     Arthritis     Benign fibroma of prostate 11/28/2023    COVID-19 09/02/2021    Received Casirivimab,Imdevimab in the ED; not hospitalized    Epididymitis     GERD (gastroesophageal reflux disease)     Heartburn     Hydrocele     Kidney stone     Mixed hyperlipidemia 11/28/2023    Primary hypertension 04/04/2023    Type 2 diabetes mellitus     Vitamin D deficiency 11/28/2023       Allergies   Allergen Reactions    Bactrim [Sulfamethoxazole-Trimethoprim] Rash and Other (See Comments)     Flu like symptoms       Past Surgical History:   Procedure Laterality Date    COLONOSCOPY      CYSTOSCOPY W/ URETEROSCOPY W/ LITHOTRIPSY      FINGER SURGERY      HAND SURGERY      HERNIA REPAIR      SCROTAL EXPLORATION Left 06/28/2018    Procedure: SCROTAL EXPLORATION/ INGUINAL EXPLORATION HEMIORCHICETOMY WITH FROZEN SECTION.;  Surgeon: Edvin Mars MD;  Location: [facility];  Service: Urology       Family  History   Problem Relation Age of Onset    No Known Problems Father     Diabetes Mother        Social History     Socioeconomic History    Marital status:    Tobacco Use    Smoking status: Never    Smokeless tobacco: Never   Vaping Use    Vaping Use: Never used   Substance and Sexual Activity    Alcohol use: No    Drug use: No    Sexual activity: Yes     Partners: Female           Objective   Physical Exam  Vitals and nursing note reviewed.   Constitutional:       General: He is not in acute distress.     Appearance: He is well-developed. He is not diaphoretic.      Comments: 68-year-old male lying in bed no acute distress, nontoxic, well-appearing   HENT:      Head: Normocephalic and atraumatic.      Right Ear: External ear normal.      Left Ear: External ear normal.      Nose: Nose normal.   Eyes:      Conjunctiva/sclera: Conjunctivae normal.      Pupils: Pupils are equal, round, and reactive to light.   Neck:      Vascular: No JVD.      Trachea: No tracheal deviation.   Cardiovascular:      Rate and Rhythm: Normal rate and regular rhythm.      Heart sounds: Normal heart sounds. No murmur heard.  Pulmonary:      Effort: Pulmonary effort is normal. No respiratory distress.      Breath sounds: Normal breath sounds. No wheezing.      Comments: Patient speaking in full sentences, no respiratory distress, no retractions, no abdominal breathing.  Abdominal:      Palpations: Abdomen is soft.      Tenderness: There is no abdominal tenderness.      Comments: Abdomen soft, no guarding, rebound, rigidity or signs of peritonitis, no reproducible abdominal tenderness to palpation, particular in the right upper quadrant, no pulsatile mass, no palpable mass.   Musculoskeletal:         General: No deformity. Normal range of motion.      Cervical back: Normal range of motion and neck supple.   Skin:     General: Skin is warm and dry.      Coloration: Skin is not pale.      Findings: No erythema or rash.   Neurological:       Mental Status: He is alert and oriented to person, place, and time.      Cranial Nerves: No cranial nerve deficit.   Psychiatric:         Behavior: Behavior normal.         Thought Content: Thought content normal.         Procedures           ED Course  ED Course as of 11/29/23 0010 Wed Nov 29, 2023   0010 ECG 12 Lead Chest Pain  Normal sinus rhythm rate of 74 QRS of 82 QTc 381, no ST segment elevation, depression, T wave inversions or signs of acute ischemia no signs of acute dysrhythmia.  Unremarkable EKG, interpreted by myself.      Electronically signed by Praveen Avilez MD, 11/29/23, 12:10 AM EST.   [SM]      ED Course User Index  [SM] Praveen Avilez MD                                             Medical Decision Making  68-year-old male who presents today for evaluation of intermittent right upper quadrant abdominal pain, got worse today.  Patient's workup shows a slightly elevated bilirubin level, as well as LFTs.  CT scan and right upper quadrant ultrasound were obtained without signs of any acute obstructive process, no signs of choledocholithiasis, no signs of obstructing mass, common bile duct is within normal limits.  I discussed outpatient management patient family at the bedside, however there and there is concern about patient's ability and drive to follow-up on an outpatient basis for additional diagnostic testing.  I did discuss case with hospitalist, he did agree to admit the patient for possible HIDA scan, possible MRCP.  Hepatitis panel was obtained, is negative.  Unsure the exact etiology of the patient's symptoms.  Plan discussed with patient and family, they voiced understanding.    Problems Addressed:  Right upper quadrant abdominal pain: complicated acute illness or injury    Amount and/or Complexity of Data Reviewed  Labs: ordered.  Radiology: ordered.  ECG/medicine tests: ordered.    Risk  Prescription drug management.  Decision regarding hospitalization.        Final  diagnoses:   Right upper quadrant abdominal pain       ED Disposition  ED Disposition       ED Disposition   Decision to Admit    Condition   --    Comment   Level of Care: Medical Telemetry [23]   Diagnosis: Acute hepatitis [604467]                 No follow-up provider specified.       Medication List        ASK your doctor about these medications      esomeprazole 40 MG capsule  Commonly known as: nexIUM  Ask about: Which instructions should I use?                 Praveen Avilez MD  11/29/23 0011

## 2023-11-29 ENCOUNTER — APPOINTMENT (OUTPATIENT)
Dept: MRI IMAGING | Facility: HOSPITAL | Age: 68
DRG: 443 | End: 2023-11-29
Payer: MEDICARE

## 2023-11-29 LAB
ALBUMIN SERPL-MCNC: 3.8 G/DL (ref 3.5–5.2)
ALBUMIN/GLOB SERPL: 1.7 G/DL
ALP SERPL-CCNC: 154 U/L (ref 39–117)
ALT SERPL W P-5'-P-CCNC: 309 U/L (ref 1–41)
ANION GAP SERPL CALCULATED.3IONS-SCNC: 10.2 MMOL/L (ref 5–15)
APTT PPP: 28.3 SECONDS (ref 26.5–34.5)
AST SERPL-CCNC: 210 U/L (ref 1–40)
BILIRUB CONJ SERPL-MCNC: 3.6 MG/DL (ref 0–0.3)
BILIRUB INDIRECT SERPL-MCNC: 0.6 MG/DL
BILIRUB SERPL-MCNC: 4.2 MG/DL (ref 0–1.2)
BILIRUB SERPL-MCNC: 4.3 MG/DL (ref 0–1.2)
BUN SERPL-MCNC: 16 MG/DL (ref 8–23)
BUN/CREAT SERPL: 13.2 (ref 7–25)
CALCIUM SPEC-SCNC: 8.9 MG/DL (ref 8.6–10.5)
CHLORIDE SERPL-SCNC: 102 MMOL/L (ref 98–107)
CO2 SERPL-SCNC: 23.8 MMOL/L (ref 22–29)
CREAT SERPL-MCNC: 1.21 MG/DL (ref 0.76–1.27)
EGFRCR SERPLBLD CKD-EPI 2021: 65.6 ML/MIN/1.73
GEN 5 2HR TROPONIN T REFLEX: 21 NG/L
GLOBULIN UR ELPH-MCNC: 2.3 GM/DL
GLUCOSE BLDC GLUCOMTR-MCNC: 106 MG/DL (ref 70–130)
GLUCOSE BLDC GLUCOMTR-MCNC: 143 MG/DL (ref 70–130)
GLUCOSE BLDC GLUCOMTR-MCNC: 170 MG/DL (ref 70–130)
GLUCOSE BLDC GLUCOMTR-MCNC: 176 MG/DL (ref 70–130)
GLUCOSE SERPL-MCNC: 141 MG/DL (ref 65–99)
INR PPP: 0.93 (ref 0.9–1.1)
INR PPP: 1 (ref 0.9–1.1)
POTASSIUM SERPL-SCNC: 4.4 MMOL/L (ref 3.5–5.2)
PROT SERPL-MCNC: 6.1 G/DL (ref 6–8.5)
PROTHROMBIN TIME: 13 SECONDS (ref 12.1–14.7)
PROTHROMBIN TIME: 13.7 SECONDS (ref 12.1–14.7)
SODIUM SERPL-SCNC: 136 MMOL/L (ref 136–145)
TROPONIN T DELTA: 12 NG/L

## 2023-11-29 PROCEDURE — 74181 MRI ABDOMEN W/O CONTRAST: CPT | Performed by: RADIOLOGY

## 2023-11-29 PROCEDURE — 63710000001 INSULIN LISPRO (HUMAN) PER 5 UNITS: Performed by: STUDENT IN AN ORGANIZED HEALTH CARE EDUCATION/TRAINING PROGRAM

## 2023-11-29 PROCEDURE — 84484 ASSAY OF TROPONIN QUANT: CPT | Performed by: INTERNAL MEDICINE

## 2023-11-29 PROCEDURE — 93010 ELECTROCARDIOGRAM REPORT: CPT | Performed by: INTERNAL MEDICINE

## 2023-11-29 PROCEDURE — 82948 REAGENT STRIP/BLOOD GLUCOSE: CPT

## 2023-11-29 PROCEDURE — 82247 BILIRUBIN TOTAL: CPT | Performed by: STUDENT IN AN ORGANIZED HEALTH CARE EDUCATION/TRAINING PROGRAM

## 2023-11-29 PROCEDURE — 25010000002 MORPHINE PER 10 MG: Performed by: STUDENT IN AN ORGANIZED HEALTH CARE EDUCATION/TRAINING PROGRAM

## 2023-11-29 PROCEDURE — 74181 MRI ABDOMEN W/O CONTRAST: CPT

## 2023-11-29 PROCEDURE — 85730 THROMBOPLASTIN TIME PARTIAL: CPT | Performed by: INTERNAL MEDICINE

## 2023-11-29 PROCEDURE — 25010000002 ONDANSETRON PER 1 MG: Performed by: STUDENT IN AN ORGANIZED HEALTH CARE EDUCATION/TRAINING PROGRAM

## 2023-11-29 PROCEDURE — 25810000003 LACTATED RINGERS PER 1000 ML: Performed by: STUDENT IN AN ORGANIZED HEALTH CARE EDUCATION/TRAINING PROGRAM

## 2023-11-29 PROCEDURE — 82248 BILIRUBIN DIRECT: CPT | Performed by: STUDENT IN AN ORGANIZED HEALTH CARE EDUCATION/TRAINING PROGRAM

## 2023-11-29 PROCEDURE — 85610 PROTHROMBIN TIME: CPT | Performed by: INTERNAL MEDICINE

## 2023-11-29 PROCEDURE — G0378 HOSPITAL OBSERVATION PER HR: HCPCS

## 2023-11-29 PROCEDURE — 99232 SBSQ HOSP IP/OBS MODERATE 35: CPT | Performed by: STUDENT IN AN ORGANIZED HEALTH CARE EDUCATION/TRAINING PROGRAM

## 2023-11-29 PROCEDURE — 99203 OFFICE O/P NEW LOW 30 MIN: CPT | Performed by: SURGERY

## 2023-11-29 PROCEDURE — 80053 COMPREHEN METABOLIC PANEL: CPT | Performed by: INTERNAL MEDICINE

## 2023-11-29 PROCEDURE — 93005 ELECTROCARDIOGRAM TRACING: CPT | Performed by: INTERNAL MEDICINE

## 2023-11-29 RX ORDER — SODIUM CHLORIDE, SODIUM LACTATE, POTASSIUM CHLORIDE, CALCIUM CHLORIDE 600; 310; 30; 20 MG/100ML; MG/100ML; MG/100ML; MG/100ML
100 INJECTION, SOLUTION INTRAVENOUS CONTINUOUS
Status: DISCONTINUED | OUTPATIENT
Start: 2023-11-29 | End: 2023-11-30

## 2023-11-29 RX ORDER — SODIUM CHLORIDE 0.9 % (FLUSH) 0.9 %
10 SYRINGE (ML) INJECTION AS NEEDED
Status: DISCONTINUED | OUTPATIENT
Start: 2023-11-29 | End: 2023-12-01 | Stop reason: HOSPADM

## 2023-11-29 RX ORDER — AMOXICILLIN 250 MG
2 CAPSULE ORAL 2 TIMES DAILY
Status: DISCONTINUED | OUTPATIENT
Start: 2023-11-29 | End: 2023-12-01 | Stop reason: HOSPADM

## 2023-11-29 RX ORDER — BISACODYL 10 MG
10 SUPPOSITORY, RECTAL RECTAL DAILY PRN
Status: DISCONTINUED | OUTPATIENT
Start: 2023-11-29 | End: 2023-12-01 | Stop reason: HOSPADM

## 2023-11-29 RX ORDER — MORPHINE SULFATE 2 MG/ML
2 INJECTION, SOLUTION INTRAMUSCULAR; INTRAVENOUS
Status: DISCONTINUED | OUTPATIENT
Start: 2023-11-29 | End: 2023-12-01 | Stop reason: HOSPADM

## 2023-11-29 RX ORDER — DEXTROSE MONOHYDRATE 25 G/50ML
25 INJECTION, SOLUTION INTRAVENOUS
Status: DISCONTINUED | OUTPATIENT
Start: 2023-11-29 | End: 2023-12-01 | Stop reason: HOSPADM

## 2023-11-29 RX ORDER — OMEGA-3S/DHA/EPA/FISH OIL/D3 300MG-1000
1000 CAPSULE ORAL DAILY
Status: DISCONTINUED | OUTPATIENT
Start: 2023-11-29 | End: 2023-12-01 | Stop reason: HOSPADM

## 2023-11-29 RX ORDER — LISINOPRIL 2.5 MG/1
5 TABLET ORAL DAILY
Status: DISCONTINUED | OUTPATIENT
Start: 2023-11-29 | End: 2023-12-01 | Stop reason: HOSPADM

## 2023-11-29 RX ORDER — HEPARIN SODIUM 5000 [USP'U]/ML
5000 INJECTION, SOLUTION INTRAVENOUS; SUBCUTANEOUS EVERY 12 HOURS SCHEDULED
Status: DISCONTINUED | OUTPATIENT
Start: 2023-11-29 | End: 2023-12-01 | Stop reason: HOSPADM

## 2023-11-29 RX ORDER — ASPIRIN 81 MG/1
324 TABLET, CHEWABLE ORAL ONCE
Status: COMPLETED | OUTPATIENT
Start: 2023-11-29 | End: 2023-11-29

## 2023-11-29 RX ORDER — GLUCAGON 1 MG/ML
1 KIT INJECTION
Status: DISCONTINUED | OUTPATIENT
Start: 2023-11-29 | End: 2023-12-01 | Stop reason: HOSPADM

## 2023-11-29 RX ORDER — INSULIN LISPRO 100 [IU]/ML
2-7 INJECTION, SOLUTION INTRAVENOUS; SUBCUTANEOUS
Status: DISCONTINUED | OUTPATIENT
Start: 2023-11-29 | End: 2023-12-01 | Stop reason: HOSPADM

## 2023-11-29 RX ORDER — SODIUM CHLORIDE 9 MG/ML
40 INJECTION, SOLUTION INTRAVENOUS AS NEEDED
Status: DISCONTINUED | OUTPATIENT
Start: 2023-11-29 | End: 2023-12-01 | Stop reason: HOSPADM

## 2023-11-29 RX ORDER — SODIUM CHLORIDE 0.9 % (FLUSH) 0.9 %
10 SYRINGE (ML) INJECTION EVERY 12 HOURS SCHEDULED
Status: DISCONTINUED | OUTPATIENT
Start: 2023-11-29 | End: 2023-12-01 | Stop reason: HOSPADM

## 2023-11-29 RX ORDER — ESOMEPRAZOLE MAGNESIUM 40 MG/1
40 CAPSULE, DELAYED RELEASE ORAL
COMMUNITY

## 2023-11-29 RX ORDER — MELATONIN
25000 DAILY
Status: CANCELLED | OUTPATIENT
Start: 2023-11-29

## 2023-11-29 RX ORDER — POLYETHYLENE GLYCOL 3350 17 G/17G
17 POWDER, FOR SOLUTION ORAL DAILY PRN
Status: DISCONTINUED | OUTPATIENT
Start: 2023-11-29 | End: 2023-12-01 | Stop reason: HOSPADM

## 2023-11-29 RX ORDER — ONDANSETRON 2 MG/ML
4 INJECTION INTRAMUSCULAR; INTRAVENOUS EVERY 6 HOURS PRN
Status: DISCONTINUED | OUTPATIENT
Start: 2023-11-29 | End: 2023-12-01 | Stop reason: HOSPADM

## 2023-11-29 RX ORDER — PANTOPRAZOLE SODIUM 40 MG/1
40 TABLET, DELAYED RELEASE ORAL
Status: DISCONTINUED | OUTPATIENT
Start: 2023-11-29 | End: 2023-12-01 | Stop reason: HOSPADM

## 2023-11-29 RX ORDER — NICOTINE POLACRILEX 4 MG
15 LOZENGE BUCCAL
Status: DISCONTINUED | OUTPATIENT
Start: 2023-11-29 | End: 2023-12-01 | Stop reason: HOSPADM

## 2023-11-29 RX ORDER — BISACODYL 5 MG/1
5 TABLET, DELAYED RELEASE ORAL DAILY PRN
Status: DISCONTINUED | OUTPATIENT
Start: 2023-11-29 | End: 2023-12-01 | Stop reason: HOSPADM

## 2023-11-29 RX ORDER — NITROGLYCERIN 0.4 MG/1
0.4 TABLET SUBLINGUAL
Status: DISCONTINUED | OUTPATIENT
Start: 2023-11-29 | End: 2023-12-01 | Stop reason: HOSPADM

## 2023-11-29 RX ORDER — METFORMIN HYDROCHLORIDE 500 MG/1
1000 TABLET, EXTENDED RELEASE ORAL
COMMUNITY

## 2023-11-29 RX ADMIN — INSULIN LISPRO 2 UNITS: 100 INJECTION, SOLUTION INTRAVENOUS; SUBCUTANEOUS at 13:38

## 2023-11-29 RX ADMIN — INSULIN LISPRO 2 UNITS: 100 INJECTION, SOLUTION INTRAVENOUS; SUBCUTANEOUS at 09:19

## 2023-11-29 RX ADMIN — MORPHINE SULFATE 2 MG: 2 INJECTION, SOLUTION INTRAMUSCULAR; INTRAVENOUS at 15:15

## 2023-11-29 RX ADMIN — SODIUM CHLORIDE, POTASSIUM CHLORIDE, SODIUM LACTATE AND CALCIUM CHLORIDE 100 ML/HR: 600; 310; 30; 20 INJECTION, SOLUTION INTRAVENOUS at 13:40

## 2023-11-29 RX ADMIN — Medication 10 ML: at 20:46

## 2023-11-29 RX ADMIN — SODIUM CHLORIDE, POTASSIUM CHLORIDE, SODIUM LACTATE AND CALCIUM CHLORIDE 100 ML/HR: 600; 310; 30; 20 INJECTION, SOLUTION INTRAVENOUS at 23:07

## 2023-11-29 RX ADMIN — MORPHINE SULFATE 2 MG: 2 INJECTION, SOLUTION INTRAMUSCULAR; INTRAVENOUS at 19:25

## 2023-11-29 RX ADMIN — LINAGLIPTIN 5 MG: 5 TABLET, FILM COATED ORAL at 13:39

## 2023-11-29 RX ADMIN — ONDANSETRON 4 MG: 2 INJECTION INTRAMUSCULAR; INTRAVENOUS at 15:15

## 2023-11-29 RX ADMIN — PANTOPRAZOLE SODIUM 40 MG: 40 TABLET, DELAYED RELEASE ORAL at 13:39

## 2023-11-29 RX ADMIN — Medication 10 ML: at 00:44

## 2023-11-29 RX ADMIN — LISINOPRIL 5 MG: 2.5 TABLET ORAL at 13:39

## 2023-11-29 RX ADMIN — DOCUSATE SODIUM 50 MG AND SENNOSIDES 8.6 MG 2 TABLET: 8.6; 5 TABLET, FILM COATED ORAL at 20:46

## 2023-11-29 RX ADMIN — Medication 1000 UNITS: at 13:39

## 2023-11-29 RX ADMIN — DOCUSATE SODIUM 50 MG AND SENNOSIDES 8.6 MG 2 TABLET: 8.6; 5 TABLET, FILM COATED ORAL at 00:44

## 2023-11-29 RX ADMIN — ASPIRIN 324 MG: 81 TABLET, CHEWABLE ORAL at 08:27

## 2023-11-29 NOTE — PLAN OF CARE
Goal Outcome Evaluation:  Plan of Care Reviewed With: patient        Progress: no change  Outcome Evaluation: Patient restingin bed. Patient admitted from ED. Patient is alert and oriented x4. No acute changes or compliants at this time. Will continue with plan of care.

## 2023-11-29 NOTE — CONSULTS
[facility]   Consult Note    Patient Name: Evan Salazar  : 1955  MRN: [id number]  Primary Care Physician:  Sharon Dunn DO  Referring Physician: No Known Provider  Date of admission: 2023    Consults  Subjective   Subjective     Reason for Consult/ Chief Complaint: Elevated bilirubin, gallstones    Abdominal Pain  Pertinent negatives include no constipation, diarrhea, dysuria, fever, headaches, nausea or vomiting.     Evan Salazar is a 68 y.o. male presenting with upper abdominal pain and discomfort.  He was noted to have an elevated bilirubin at time of admission though scans were within normal limits upon further review there appeared to be a distal common bile duct stone on CT abdomen pelvis.  MRCP was then ordered due to the elevated bilirubin and possible common duct stone which showed no evidence of biliary obstruction and the patient symptoms have resolved likely represent passage of the gallstone.  The patient has jaundice and scleral icterus.  Bilirubin has increased from 1.8-4.3 since first evaluated in the emergency department.  Previous inguinal hernia repair reported.  No other surgeries reported.    Review of Systems   Constitutional:  Negative for activity change, appetite change, chills and fever.   HENT:  Negative for sore throat and trouble swallowing.    Eyes:  Negative for visual disturbance.   Respiratory:  Negative for cough and shortness of breath.    Cardiovascular:  Negative for chest pain and palpitations.   Gastrointestinal:  Positive for abdominal pain. Negative for abdominal distention, blood in stool, constipation, diarrhea, nausea and vomiting.   Endocrine: Negative for cold intolerance and heat intolerance.   Genitourinary:  Negative for dysuria.   Musculoskeletal:  Negative for joint swelling.   Skin:  Positive for color change. Negative for rash and wound.   Allergic/Immunologic: Negative for immunocompromised state.   Neurological:  Negative for dizziness,  seizures, weakness and headaches.   Hematological:  Negative for adenopathy. Does not bruise/bleed easily.   Psychiatric/Behavioral:  Negative for agitation and confusion.         Personal History     Past Medical History:   Diagnosis Date    Anxiety     Arthritis     Benign fibroma of prostate 11/28/2023    COVID-19 09/02/2021    Received Casirivimab,Imdevimab in the ED; not hospitalized    Epididymitis     GERD (gastroesophageal reflux disease)     Heartburn     Hydrocele     Kidney stone     Mixed hyperlipidemia 11/28/2023    Primary hypertension 04/04/2023    Type 2 diabetes mellitus     Vitamin D deficiency 11/28/2023       Past Surgical History:   Procedure Laterality Date    COLONOSCOPY      CYSTOSCOPY W/ URETEROSCOPY W/ LITHOTRIPSY      FINGER SURGERY      HAND SURGERY      HERNIA REPAIR      SCROTAL EXPLORATION Left 06/28/2018    Procedure: SCROTAL EXPLORATION/ INGUINAL EXPLORATION HEMIORCHICETOMY WITH FROZEN SECTION.;  Surgeon: Edvin Mars MD;  Location: [facility];  Service: Urology       Family History: family history includes Diabetes in his mother; No Known Problems in his father. Otherwise pertinent FHx was reviewed and not pertinent to current issue.    Social History:  reports that he has never smoked. He has never been exposed to tobacco smoke. He has never used smokeless tobacco. He reports that he does not drink alcohol and does not use drugs.    Home Medications:   SITagliptin, cholecalciferol, esomeprazole, lisinopril, metFORMIN ER, and omega-3 acid ethyl esters    Allergies:  Allergies   Allergen Reactions    Bactrim [Sulfamethoxazole-Trimethoprim] Rash and Other (See Comments)     Flu like symptoms       Objective    Objective     Vitals:  Temp:  [97.9 °F (36.6 °C)-98.6 °F (37 °C)] 97.9 °F (36.6 °C)  Heart Rate:  [60-99] 60  Resp:  [14-18] 16  BP: (110-162)/(54-86) 142/68    Physical Exam  Constitutional:       Appearance: He is well-developed.   HENT:      Head: Normocephalic and  atraumatic.   Eyes:      General: Scleral icterus present.      Conjunctiva/sclera: Conjunctivae normal.      Pupils: Pupils are equal, round, and reactive to light.   Neck:      Thyroid: No thyromegaly.      Vascular: No JVD.      Trachea: No tracheal deviation.   Cardiovascular:      Rate and Rhythm: Normal rate and regular rhythm.      Heart sounds: No murmur heard.     No friction rub. No gallop.   Pulmonary:      Effort: Pulmonary effort is normal.      Breath sounds: Normal breath sounds.   Abdominal:      General: There is no distension.      Palpations: Abdomen is soft. There is no hepatomegaly or splenomegaly.      Tenderness: There is no abdominal tenderness.      Hernia: No hernia is present.   Musculoskeletal:         General: No deformity. Normal range of motion.      Cervical back: Neck supple.   Skin:     General: Skin is warm and dry.   Neurological:      Mental Status: He is alert and oriented to person, place, and time.         Result Review    Result Review:  I have personally reviewed the results from the time of this admission to 11/29/2023 15:42 EST and agree with these findings:  [x]  Laboratory list / accordion  []  Microbiology  [x]  Radiology  []  EKG/Telemetry   []  Cardiology/Vascular   []  Pathology  []  Old records  []  Other:        Assessment & Plan   Assessment / Plan     Brief Patient Summary:  Evan Salazar is a 68 y.o. male who presents with upper abdominal pain and elevated bilirubin possibly suggesting choledocholithiasis which may have passed spontaneously as MRCP shows no biliary obstruction though CT initially appeared to show common duct stone in the distal common bile duct.    Active Hospital Problems:  Active Hospital Problems    Diagnosis     **Acute hepatitis      Plan:   Trend bilirubin, if continues to rise may require ERCP.  If bilirubin trending down suggesting passage of common duct stone we will perform cholecystectomy likely tomorrow.    Fneg Kyle,  MD

## 2023-11-29 NOTE — PROGRESS NOTES
[facility] HOSPITALIST PROGRESS NOTE     Patient Identification:  Name:  Evan Salazar  Age:  68 y.o.  Sex:  male  :  1955  MRN:  [id number]  Visit Number:  [id number]  ROOM: [address] [place] [address]     Primary Care Provider:  Sharon Dunn DO    Length of stay in inpatient status:  0    Subjective     Chief Compliant:    Chief Complaint   Patient presents with    Abdominal Pain       History of Presenting Illness: Patient seen and evaluated in follow-up for right upper quadrant pain with acute hepatitis likely secondary to choledocholithiasis given laboratory abnormalities.  Patient at time of exam status post MRCP which revealed no obstructing stone and only gallbladder sludge.  Discussed with patient and family regarding general surgery recommendations and trending of bilirubin and possible cholecystectomy tomorrow which they are agreeable to.    Objective     Current Hospital Meds:  cholecalciferol, 1,000 Units, Oral, Daily  heparin (porcine), 5,000 Units, Subcutaneous, Q12H  insulin lispro, 2-7 Units, Subcutaneous, 4x Daily AC & at Bedtime  linagliptin, 5 mg, Oral, Daily  lisinopril, 5 mg, Oral, Daily  pantoprazole, 40 mg, Oral, Q AM  senna-docusate sodium, 2 tablet, Oral, BID  sodium chloride, 10 mL, Intravenous, Q12H      lactated ringers, 100 mL/hr, Last Rate: 100 mL/hr (23 1340)      ----------------------------------------------------------------------------------------------------------------------  Vital Signs:  Temp:  [97.9 °F (36.6 °C)-98.6 °F (37 °C)] 97.9 °F (36.6 °C)  Heart Rate:  [60-99] 60  Resp:  [14-18] 16  BP: (110-162)/(54-86) 142/68  SpO2:  [91 %-99 %] 94 %  on   ;   Device (Oxygen Therapy): room air  Body mass index is 23.92 kg/m².      Intake/Output Summary (Last 24 hours) at 2023 1659  Last data filed at 2023 0730  Gross per 24 hour   Intake 1600 ml   Output --   Net 1600 ml     "  ----------------------------------------------------------------------------------------------------------------------  Physical exam:  Constitutional:  Well-developed and well-nourished.  No acute distress.      HENT:  Head:  Normocephalic and atraumatic.  Mouth:  Moist mucous membranes.    Eyes:  Conjunctivae and EOM are normal. No scleral icterus.    Neck:  Neck supple.  No JVD present.    Cardiovascular:  Normal rate, regular rhythm and normal heart sounds with no murmur.  Pulmonary/Chest:  No respiratory distress, no wheezes, no crackles, with normal breath sounds and good air movement.  Abdominal:  Soft.  Bowel sounds are hyperactive.  There is mild distention with tenderness to palpation of the superior aspect of the right upper quadrant.   Musculoskeletal:  No tenderness or deformity.  No red or swollen joints anywhere.  Functional ROM intact.   Neurological:  Alert and oriented to person, place, and time.  No cranial nerve deficit.  No tongue deviation.  No facial droop.  No slurred speech. Intact Sensation throughout  Skin:  Skin is warm and dry. No rash or lesion noted. No pallor.   Peripheral vascular:  Pulses in all 4 extremities with no clubbing, no cyanosis, no edema.  Psychiatric: Appropriate mood and affect, pleasant.   ----------------------------------------------------------------------------------------------------------------------  WBC/HGB/HCT/PLT   7.99/13.0/38.9/181 (11/28 1818)  BUN/CREAT/GLUC/ALT/AST/RUPERT/LIP    16/1.21/141/309/210/--/27 (11/28 1818-11/29 0728)  LYTES - Na/K/Cl/CO2: 136/4.4/102/23.8 (11/29 0728)  COAG - PT/INR/PTT: 13.7/1.00/28.3 (11/29 0728)     No results found for: \"URINECX\"  No results found for: \"BLOODCX\"    I have personally looked at the labs and they are summarized above.  ----------------------------------------------------------------------------------------------------------------------  Detailed radiology reports for the last 24 hours:  MRI abdomen wo contrast " mrcp    Result Date: 11/29/2023    Possibly layering sludge within the gallbladder.   This report was finalized on 11/29/2023 12:27 PM by Dr. Lexx Turk MD.      US Gallbladder    Result Date: 11/28/2023  Impression: 1. No acute process.   This report was finalized on 11/28/2023 10:24 PM by Daniel Mcelroy DO.      CT Abdomen Pelvis With Contrast    Result Date: 11/28/2023  Impression: 1. No acute process with chronic findings above.   This report was finalized on 11/28/2023 8:24 PM by Daniel Mcelroy DO.     Assessment & Plan      Acute hepatitis  Right upper quadrant pain  Suspected recently passed gallstone    -Ultrasound of the gallbladder as well as CT of the abdomen pelvis all reviewed and with no significant findings.  However given patient's persistently elevated bilirubin MRCP ordered which revealed no obstructing stone.    -Repeat labs today show improving AST and ALT and patient still having some abdominal pain but slightly improved from initial presentation and given negative MRCP as discussed with general surgery suspect potentially recently passed gallstone.    -General surgery consulted and following along planning for likely cholecystectomy tomorrow and with increasing bilirubin which would necessitate the need for ERCP.    -Acute hepatitis panel negative.    Diabetes mellitus type 2    -Clear liquid diet for now with basal and bolus insulin as needed to maintain glycemic control.    Essential hypertension    -Continue home medications    Copied text in portions of the note has been reviewed and is accurate as of 11/29/23    VTE Prophylaxis:   Mechanical Order History:       None          Pharmalogical Order History:        Ordered     Dose Route Frequency Stop    11/29/23 0025  heparin (porcine) 5000 UNIT/ML injection 5,000 Units         5,000 Units SC Every 12 Hours Scheduled --                    Disposition pending clinical course but likely home postoperatively.    Nikolay Calles DO  [facility]  [facility]  11/29/23  16:59 EST

## 2023-11-29 NOTE — H&P
"    [facility] Medicine Services  History & Physical    Patient Identification:  Name:  Evan Salazar  Age:  68 y.o.  Sex:  male  :  1955  MRN:  [id number]   Visit Number:  [id number]  Admit Date: 2023   Primary Care Physician:  Sharon Dunn DO    Subjective     Chief complaint: Abdominal pain     History of presenting illness:      Evan Salazar is a 68 y.o. male with past medical history significant for:     GERD  Bradycardia  Essential HTN  Type 2 DM   Osteoarthritis  Anxiety       presented to the ED for further evaluation of abdominal pain. Upon arrival to the ED, vital signs were T 98.2F, pules 98, RR 17, and /78 with room air oxygen saturation 97%.   He reported \"gallbladder acting up\" in the ED . He reported right-sided abdominal pain began this morning after waking up.  He reports a smiliar episode a month ago for which he did not seek medical attention . He reports pain is 10/10 in severity .  He denied nausea/vomiting during ED evaluation.     Labs in ED revealed elevated AST,ALT,lactic & alk phos as found within lab values in this document . Acute hepatits panel was negative and imaging studies including CT abdomen and US gallbladder. Patient was discussed with General Surgeon by ED staff.     Upon bedside evaluation, Mr Salazar reports he has had episodes of RUQ pain on and off for a few months but frequency and duration have recently increased. His daughter is present. He has been slightly more jaundiced and has episodes at times when he becomes more yellow appearing and has scleral icterus.  On examination, sclera are not icteric, but he is slightly jaundiced in appearance.   His abdominal pain did improve with IV morphine.  He reports this AM when symptoms began he was very nauseous.   He reports the nausea did mildly improve. 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  Shortage of chocolate flavored " [employer] cakes and supply/demand discussed at bedside in addition to his health woes.   ---------------------------------------------------------------------------------------------------------------------   Review of Systems   Constitutional:  Negative for activity change, chills and diaphoresis.   HENT:  Negative for congestion and drooling.    Eyes:  Negative for pain and discharge.   Respiratory:  Negative for cough, choking, chest tightness and shortness of breath.    Cardiovascular:  Negative for chest pain and leg swelling.   Gastrointestinal:  Positive for abdominal distention and abdominal pain.   Endocrine: Negative for cold intolerance and heat intolerance.   Genitourinary:  Negative for difficulty urinating and dysuria.   Musculoskeletal:  Negative for arthralgias and gait problem.   Skin:  Negative for color change.      ---------------------------------------------------------------------------------------------------------------------   Past Medical History:   Diagnosis Date    Anxiety     Arthritis     Benign fibroma of prostate 11/28/2023    COVID-19 09/02/2021    Received Casirivimab,Imdevimab in the ED; not hospitalized    Epididymitis     GERD (gastroesophageal reflux disease)     Heartburn     Hydrocele     Kidney stone     Mixed hyperlipidemia 11/28/2023    Primary hypertension 04/04/2023    Type 2 diabetes mellitus     Vitamin D deficiency 11/28/2023     Past Surgical History:   Procedure Laterality Date    COLONOSCOPY      CYSTOSCOPY W/ URETEROSCOPY W/ LITHOTRIPSY      FINGER SURGERY      HAND SURGERY      HERNIA REPAIR      SCROTAL EXPLORATION Left 06/28/2018    Procedure: SCROTAL EXPLORATION/ INGUINAL EXPLORATION HEMIORCHICETOMY WITH FROZEN SECTION.;  Surgeon: Edvin Mars MD;  Location: [facility] OR;  Service: Urology     Family History   Problem Relation Age of Onset    No Known Problems Father     Diabetes Mother      Social History     Socioeconomic History    Marital  status:    Tobacco Use    Smoking status: Never     Passive exposure: Never    Smokeless tobacco: Never   Vaping Use    Vaping Use: Never used   Substance and Sexual Activity    Alcohol use: No    Drug use: No    Sexual activity: Yes     Partners: Female     ---------------------------------------------------------------------------------------------------------------------   Allergies:  Bactrim [sulfamethoxazole-trimethoprim]  ---------------------------------------------------------------------------------------------------------------------   Home medications:    Medications below are reported home medications pulling from within the system; at this time, these medications have not been reconciled unless otherwise specified and are in the verification process for further verifcation as current home medications.  Medications Prior to Admission   Medication Sig Dispense Refill Last Dose    cholecalciferol (VITAMIN D3) 25 MCG (1000 UT) tablet Take 25 tablets by mouth Daily.       esomeprazole (nexIUM) 40 MG capsule Take 1 capsule by mouth Every Morning Before Breakfast.       lisinopril (PRINIVIL,ZESTRIL) 5 MG tablet TAKE 1 TABLET BY MOUTH DAILY 30 tablet 3     metFORMIN (GLUCOPHAGE) 500 MG tablet Take 2 tablets by mouth 2 (Two) Times a Day.       omega-3 acid ethyl esters (LOVAZA) 1 g capsule TAKE TWO CAPSULE BY MOUTH TWICE DAILY       SITagliptin (JANUVIA) 100 MG tablet Take 1 tablet by mouth Daily.          Hospital Scheduled Meds:  heparin (porcine), 5,000 Units, Subcutaneous, Q12H  senna-docusate sodium, 2 tablet, Oral, BID  sodium chloride, 10 mL, Intravenous, Q12H           Current listed hospital scheduled medications may not yet reflect those currently placed in orders that are signed and held awaiting patient's arrival to floor.   ---------------------------------------------------------------------------------------------------------------------     Objective     Vital Signs:  Temp:  [98.2 °F (36.8  °C)-98.4 °F (36.9 °C)] 98.4 °F (36.9 °C)  Heart Rate:  [78-99] 78  Resp:  [14-18] 18  BP: (114-162)/(54-86) 153/86      11/28/23  1741 11/29/23  0026 11/29/23  0038   Weight: 73.5 kg (162 lb) 73.5 kg (162 lb) 73.5 kg (162 lb)     Body mass index is 23.92 kg/m².  ---------------------------------------------------------------------------------------------------------------------       Physical Exam  Vitals and nursing note reviewed.   Constitutional:       General: He is awake.      Appearance: Normal appearance. He is well-developed and well-groomed.   HENT:      Head: Normocephalic and atraumatic.   Eyes:      General: Lids are normal.      Conjunctiva/sclera:      Right eye: Right conjunctiva is not injected.      Left eye: Left conjunctiva is not injected.   Cardiovascular:      Rate and Rhythm: Normal rate and regular rhythm.      Heart sounds: S1 normal and S2 normal.   Pulmonary:      Effort: No tachypnea or bradypnea.      Breath sounds: No stridor. No decreased breath sounds, wheezing, rhonchi or rales.   Abdominal:      General: There is distension.      Palpations: There is no shifting dullness or hepatomegaly.      Tenderness: There is abdominal tenderness in the right upper quadrant.   Musculoskeletal:      Right lower leg: No swelling. No edema.      Left lower leg: No swelling. No edema.   Skin:     General: Skin is warm and dry.      Coloration: Skin is jaundiced.   Neurological:      Mental Status: He is alert and oriented to person, place, and time.   Psychiatric:         Attention and Perception: Attention normal.         Mood and Affect: Mood normal.         Behavior: Behavior is cooperative.             ---------------------------------------------------------------------------------------------------------------------  EKG:            ---------------------------------------------------------------------------------------------------------------------   Results from last 7 days   Lab Units  "11/28/23 2046 11/28/23 1818   LACTATE mmol/L 1.8 2.1*   WBC 10*3/mm3  --  7.99   HEMOGLOBIN g/dL  --  13.0   HEMATOCRIT %  --  38.9   MCV fL  --  87.8   MCHC g/dL  --  33.4   PLATELETS 10*3/mm3  --  181   INR   --  0.93         Results from last 7 days   Lab Units 11/28/23  1818   SODIUM mmol/L 139   POTASSIUM mmol/L 4.4   CHLORIDE mmol/L 104   CO2 mmol/L 23.9   BUN mg/dL 20   CREATININE mg/dL 1.24   CALCIUM mg/dL 9.3   GLUCOSE mg/dL 139*   ALBUMIN g/dL 4.4   BILIRUBIN mg/dL 3.1*   ALK PHOS U/L 144*   AST (SGOT) U/L 402*   ALT (SGPT) U/L 386*   Estimated Creatinine Clearance: 60.1 mL/min (by C-G formula based on SCr of 1.24 mg/dL).  No results found for: \"AMMONIA\"  Results from last 7 days   Lab Units 11/28/23 1818   HSTROP T ng/L 9         Lab Results   Component Value Date    HGBA1C 6.80 (H) 09/07/2022     Lab Results   Component Value Date    TSH 1.170 08/21/2023    FREET4 1.22 09/01/2018     No results found for: \"PREGTESTUR\", \"PREGSERUM\", \"HCG\", \"HCGQUANT\"  Pain Management Panel           No data to display              No results found for: \"BLOODCX\"  No results found for: \"URINECX\"  No results found for: \"WOUNDCX\"  No results found for: \"STOOLCX\"      ---------------------------------------------------------------------------------------------------------------------  Imaging Results (Last 7 Days)       Procedure Component Value Units Date/Time    US Gallbladder [[id number]] Collected: 11/28/23 2224     Updated: 11/28/23 2226    Narrative:      Comparison: None.     Increased echotexture liver compatible with hepatic steatosis. No  cholelithiasis or gallbladder wall thickening seen. No pericholecystic  fluid is identified. Portal vein demonstrates normal hepatopedal flow.  Technologist reports common bile duct at 6-7 mm, not demonstrated on  images submitted. Technologist reports negative Malone's sign.       Impression:      Impression:  1. No acute process.        This report was finalized on 11/28/2023 " "10:24 PM by Daniel Mcelroy DO.       CT Abdomen Pelvis With Contrast [[id number]] Collected: 11/28/23 2017     Updated: 11/28/23 2026    Narrative:      Comparison: None      5 mm groundglass nodule right middle lung, image 9. Mild elevation right  hemidiaphragm. No pleural effusion seen. Low-attenuation liver  compatible with hepatic steatosis. Gallbladder, pancreas and adrenal  glands are unremarkable. 1.3 cm low-attenuation lesion at the anterior  spleen, image 25, too small to characterize. Compare with prior imaging  for stability or consider follow-up ultrasound to demonstrate solid  versus cystic lesion. Perinephric stranding bilaterally appears chronic.  No obstructive uropathy seen. No bowel obstruction, free air or  significant free fluid. The appendix is unremarkable. Bilateral pars  defect L5 with grade 1 spondylolisthesis.       Impression:      Impression:  1. No acute process with chronic findings above.        This report was finalized on 11/28/2023 8:24 PM by Daniel Mcelroy DO.               Cultures:  No results found for: \"BLOODCX\", \"URINECX\", \"WOUNDCX\", \"MRSACX\", \"RESPCX\", \"STOOLCX\"    Last echocardiogram:  Results for orders placed during the hospital encounter of 09/07/22    Adult Transthoracic Echo Complete W/ Cont if Necessary Per Protocol    Interpretation Summary  · Left ventricular wall thickness is consistent with mild concentric hypertrophy.  · Left ventricular ejection fraction appears to be 66 - 70%. Left ventricular systolic function is normal.  · The cardiac chamber dimensions are normal.  · No significant valvular abnormality is present.  · There is no evidence of pericardial effusion.          I have personally reviewed the above radiology images and read the final radiology report on 11/29/23  ---------------------------------------------------------------------------------------------------------------------  Assessment / Plan     Active Hospital Problems    Diagnosis  POA    " **Acute hepatitis [B17.9]  Yes       ASSESSMENT/PLAN:  -Acute Hepatitis:   -RUQ Pain:   US GB reviewed. CT abd/pelvis reviewed.   Patient discussed with GS by ED.   Acute hepatitis panel negative.   PRN pain medication on board.   T bili & direct bili elevated.   Possible HIDA in the AM.     -DM type 2, NID:  Given diabetes mellitus, fingerstick blood glucose monitoring has been ordered AC&HS.     Hemoglobin A1c added to existing specimen.   Home DM regimen to be reviewed upon availability.  Hypoglycemia protocol on board if needed.       -Essential HTN:   Vital signs q6    Review home regimen.     ----------  -DVT prophylaxis: SQ Heparin  -Activity: Ad lance  -Expected length of stay:   OBSERVATION status; however, if further evaluation or treatment plans warrant, status may change.  Based upon current information, I predict patient's care encounter to be less than or equal to 2 midnights    -Disposition Desires to return home    High risk secondary to acute hepatitis        Chelsie Stanley PA-C  11/29/23  00:59 EST  Pager # [phone]  ---------------------------------------------------------------------------------------------------------------------

## 2023-11-29 NOTE — PLAN OF CARE
Goal Outcome Evaluation:  Plan of Care Reviewed With: patient           Outcome Evaluation: patient has done well today has rested well did go for MRI surgery was consulted NPO after midnight prn pain medication and nausea meds given no acute changes will continue with plan of care

## 2023-11-30 PROBLEM — E80.6 HYPERBILIRUBINEMIA: Status: ACTIVE | Noted: 2023-11-30

## 2023-11-30 LAB
ALBUMIN SERPL-MCNC: 3.4 G/DL (ref 3.5–5.2)
ALBUMIN/GLOB SERPL: 1.6 G/DL
ALP SERPL-CCNC: 162 U/L (ref 39–117)
ALT SERPL W P-5'-P-CCNC: 227 U/L (ref 1–41)
ANION GAP SERPL CALCULATED.3IONS-SCNC: 11.6 MMOL/L (ref 5–15)
AST SERPL-CCNC: 103 U/L (ref 1–40)
BILIRUB CONJ SERPL-MCNC: 3.7 MG/DL (ref 0–0.3)
BILIRUB SERPL-MCNC: 4.2 MG/DL (ref 0–1.2)
BUN SERPL-MCNC: 16 MG/DL (ref 8–23)
BUN/CREAT SERPL: 12.1 (ref 7–25)
CALCIUM SPEC-SCNC: 8.6 MG/DL (ref 8.6–10.5)
CHLORIDE SERPL-SCNC: 102 MMOL/L (ref 98–107)
CO2 SERPL-SCNC: 23.4 MMOL/L (ref 22–29)
CREAT SERPL-MCNC: 1.32 MG/DL (ref 0.76–1.27)
DEPRECATED RDW RBC AUTO: 42.4 FL (ref 37–54)
EGFRCR SERPLBLD CKD-EPI 2021: 59.1 ML/MIN/1.73
ERYTHROCYTE [DISTWIDTH] IN BLOOD BY AUTOMATED COUNT: 13 % (ref 12.3–15.4)
GLOBULIN UR ELPH-MCNC: 2.1 GM/DL
GLUCOSE BLDC GLUCOMTR-MCNC: 115 MG/DL (ref 70–130)
GLUCOSE BLDC GLUCOMTR-MCNC: 138 MG/DL (ref 70–130)
GLUCOSE BLDC GLUCOMTR-MCNC: 140 MG/DL (ref 70–130)
GLUCOSE BLDC GLUCOMTR-MCNC: 153 MG/DL (ref 70–130)
GLUCOSE SERPL-MCNC: 124 MG/DL (ref 65–99)
HCT VFR BLD AUTO: 35.4 % (ref 37.5–51)
HGB BLD-MCNC: 11.9 G/DL (ref 13–17.7)
MCH RBC QN AUTO: 30 PG (ref 26.6–33)
MCHC RBC AUTO-ENTMCNC: 33.6 G/DL (ref 31.5–35.7)
MCV RBC AUTO: 89.2 FL (ref 79–97)
PLATELET # BLD AUTO: 144 10*3/MM3 (ref 140–450)
PMV BLD AUTO: 9.8 FL (ref 6–12)
POTASSIUM SERPL-SCNC: 4.2 MMOL/L (ref 3.5–5.2)
PROT SERPL-MCNC: 5.5 G/DL (ref 6–8.5)
QT INTERVAL: 344 MS
QT INTERVAL: 396 MS
QTC INTERVAL: 381 MS
QTC INTERVAL: 418 MS
RBC # BLD AUTO: 3.97 10*6/MM3 (ref 4.14–5.8)
SODIUM SERPL-SCNC: 137 MMOL/L (ref 136–145)
WBC NRBC COR # BLD AUTO: 6.98 10*3/MM3 (ref 3.4–10.8)

## 2023-11-30 PROCEDURE — 80053 COMPREHEN METABOLIC PANEL: CPT | Performed by: STUDENT IN AN ORGANIZED HEALTH CARE EDUCATION/TRAINING PROGRAM

## 2023-11-30 PROCEDURE — 25810000003 LACTATED RINGERS PER 1000 ML: Performed by: STUDENT IN AN ORGANIZED HEALTH CARE EDUCATION/TRAINING PROGRAM

## 2023-11-30 PROCEDURE — 82248 BILIRUBIN DIRECT: CPT | Performed by: STUDENT IN AN ORGANIZED HEALTH CARE EDUCATION/TRAINING PROGRAM

## 2023-11-30 PROCEDURE — 82948 REAGENT STRIP/BLOOD GLUCOSE: CPT

## 2023-11-30 PROCEDURE — 63710000001 INSULIN LISPRO (HUMAN) PER 5 UNITS: Performed by: STUDENT IN AN ORGANIZED HEALTH CARE EDUCATION/TRAINING PROGRAM

## 2023-11-30 PROCEDURE — 99232 SBSQ HOSP IP/OBS MODERATE 35: CPT | Performed by: STUDENT IN AN ORGANIZED HEALTH CARE EDUCATION/TRAINING PROGRAM

## 2023-11-30 PROCEDURE — 85027 COMPLETE CBC AUTOMATED: CPT | Performed by: STUDENT IN AN ORGANIZED HEALTH CARE EDUCATION/TRAINING PROGRAM

## 2023-11-30 PROCEDURE — 99231 SBSQ HOSP IP/OBS SF/LOW 25: CPT | Performed by: SURGERY

## 2023-11-30 RX ADMIN — Medication 1000 UNITS: at 08:52

## 2023-11-30 RX ADMIN — SODIUM CHLORIDE, POTASSIUM CHLORIDE, SODIUM LACTATE AND CALCIUM CHLORIDE 100 ML/HR: 600; 310; 30; 20 INJECTION, SOLUTION INTRAVENOUS at 08:56

## 2023-11-30 RX ADMIN — Medication 10 ML: at 20:16

## 2023-11-30 RX ADMIN — Medication 10 ML: at 08:53

## 2023-11-30 RX ADMIN — INSULIN LISPRO 2 UNITS: 100 INJECTION, SOLUTION INTRAVENOUS; SUBCUTANEOUS at 20:19

## 2023-11-30 RX ADMIN — DOCUSATE SODIUM 50 MG AND SENNOSIDES 8.6 MG 2 TABLET: 8.6; 5 TABLET, FILM COATED ORAL at 20:16

## 2023-11-30 RX ADMIN — LINAGLIPTIN 5 MG: 5 TABLET, FILM COATED ORAL at 08:52

## 2023-11-30 NOTE — PLAN OF CARE
Goal Outcome Evaluation:  Plan of Care Reviewed With: patient        Progress: improving  Outcome Evaluation: Patient resting in bed at this time. Patient VSS. Patient has ambulated independently this shift. Patient waiting on a bed at [facility]. Patient has no requests or concerns at this time. Will continue with plan of care.

## 2023-11-30 NOTE — PLAN OF CARE
Goal Outcome Evaluation:              Outcome Evaluation: Pt resting in bed at this time. No s/s of distress noted. Pt has complained of pain, PRN medication given per MAR. NPO since midnight. Surgery bath given for possible surgery. Pt ambulates independently. Will continue POC.

## 2023-11-30 NOTE — NURSING NOTE
Patient complained of IV being puffy. Checked the IV and it flushed okay and patient said it didn't hurt. Offered to get patient a new IV and he refused because he said he is going to [place]. Explained that we weren't sure when he would get a bed there. MD aware and MD stated that LR could be held for now.

## 2023-11-30 NOTE — PROGRESS NOTES
Chief complaint: Gallstones, jaundice    Patient bilirubin remains elevated at 4.2.  This is a direct bilirubinemia which typically indicates biliary obstructive picture.  Given the continued elevation of the bilirubin despite a negative MRCP I believe ERCP is warranted to evaluate for any distal common bile duct abnormalities or strictures versus gallstone as a.  On CT scan there was a distal common duct stone at the time of admission.    No acute distress, alert and orient x 3  Clear to auscultation bilaterally  Jaundice and scleral icterus  Abdomen soft, nontender, nondistended    68-year-old male with gallstones and possible choledocholithiasis versus other common duct abnormality causing elevated bilirubin.  He has continued elevated direct bilirubinemia though his pain has resolved.  -Recommend ERCP and will try to arrange for local GI physician to perform ERCP if available, if not may require transfer to facility where ERCP is available  -Patient will likely need cholecystectomy once any common duct abnormalities are excluded with ERCP.

## 2023-12-01 ENCOUNTER — HOSPITAL ENCOUNTER (INPATIENT)
Facility: HOSPITAL | Age: 68
LOS: 2 days | Discharge: HOME | DRG: 417 | End: 2023-12-03
Attending: INTERNAL MEDICINE | Admitting: STUDENT IN AN ORGANIZED HEALTH CARE EDUCATION/TRAINING PROGRAM
Payer: MEDICARE

## 2023-12-01 VITALS
TEMPERATURE: 98.9 F | HEART RATE: 61 BPM | WEIGHT: 164.24 LBS | SYSTOLIC BLOOD PRESSURE: 141 MMHG | HEIGHT: 69 IN | DIASTOLIC BLOOD PRESSURE: 71 MMHG | RESPIRATION RATE: 18 BRPM | BODY MASS INDEX: 24.33 KG/M2 | OXYGEN SATURATION: 97 %

## 2023-12-01 DIAGNOSIS — K81.9 CHOLECYSTITIS: ICD-10-CM

## 2023-12-01 DIAGNOSIS — K81.1 CHRONIC CHOLECYSTITIS: Primary | ICD-10-CM

## 2023-12-01 PROBLEM — E78.2 MIXED HYPERLIPIDEMIA: Status: RESOLVED | Noted: 2023-11-28 | Resolved: 2023-12-01

## 2023-12-01 PROBLEM — R17 JAUNDICE: Status: ACTIVE | Noted: 2023-12-01

## 2023-12-01 LAB
ALBUMIN SERPL-MCNC: 3.3 G/DL (ref 3.5–5.2)
ALBUMIN/GLOB SERPL: 1.3 G/DL
ALP SERPL-CCNC: 194 U/L (ref 39–117)
ALT SERPL W P-5'-P-CCNC: 186 U/L (ref 1–41)
ANION GAP SERPL CALCULATED.3IONS-SCNC: 10 MMOL/L (ref 5–15)
AST SERPL-CCNC: 76 U/L (ref 1–40)
BILIRUB SERPL-MCNC: 3.4 MG/DL (ref 0–1.2)
BUN SERPL-MCNC: 15 MG/DL (ref 8–23)
BUN/CREAT SERPL: 10.4 (ref 7–25)
CALCIUM SPEC-SCNC: 8.9 MG/DL (ref 8.6–10.5)
CHLORIDE SERPL-SCNC: 105 MMOL/L (ref 98–107)
CO2 SERPL-SCNC: 26 MMOL/L (ref 22–29)
CREAT SERPL-MCNC: 1.44 MG/DL (ref 0.76–1.27)
EGFRCR SERPLBLD CKD-EPI 2021: 53.3 ML/MIN/1.73
GLOBULIN UR ELPH-MCNC: 2.5 GM/DL
GLUCOSE BLDC GLUCOMTR-MCNC: 133 MG/DL (ref 70–130)
GLUCOSE BLDC GLUCOMTR-MCNC: 142 MG/DL (ref 70–130)
GLUCOSE BLDC GLUCOMTR-MCNC: 228 MG/DL (ref 70–130)
GLUCOSE BLDC GLUCOMTR-MCNC: 97 MG/DL (ref 70–130)
GLUCOSE SERPL-MCNC: 168 MG/DL (ref 65–99)
POTASSIUM SERPL-SCNC: 4.4 MMOL/L (ref 3.5–5.2)
PROT SERPL-MCNC: 5.8 G/DL (ref 6–8.5)
SODIUM SERPL-SCNC: 141 MMOL/L (ref 136–145)

## 2023-12-01 PROCEDURE — 99222 1ST HOSP IP/OBS MODERATE 55: CPT | Performed by: INTERNAL MEDICINE

## 2023-12-01 PROCEDURE — 25810000003 SODIUM CHLORIDE 0.9 % SOLUTION: Performed by: INTERNAL MEDICINE

## 2023-12-01 PROCEDURE — 82948 REAGENT STRIP/BLOOD GLUCOSE: CPT

## 2023-12-01 PROCEDURE — 25010000002 HEPARIN (PORCINE) PER 1000 UNITS: Performed by: INTERNAL MEDICINE

## 2023-12-01 PROCEDURE — 63710000001 INSULIN LISPRO (HUMAN) PER 5 UNITS: Performed by: INTERNAL MEDICINE

## 2023-12-01 PROCEDURE — 80053 COMPREHEN METABOLIC PANEL: CPT | Performed by: STUDENT IN AN ORGANIZED HEALTH CARE EDUCATION/TRAINING PROGRAM

## 2023-12-01 PROCEDURE — 99239 HOSP IP/OBS DSCHRG MGMT >30: CPT | Performed by: STUDENT IN AN ORGANIZED HEALTH CARE EDUCATION/TRAINING PROGRAM

## 2023-12-01 RX ORDER — SODIUM CHLORIDE 9 MG/ML
75 INJECTION, SOLUTION INTRAVENOUS CONTINUOUS
Status: ACTIVE | OUTPATIENT
Start: 2023-12-01 | End: 2023-12-02

## 2023-12-01 RX ORDER — OXYCODONE HYDROCHLORIDE AND ACETAMINOPHEN 5; 325 MG/1; MG/1
1 TABLET ORAL EVERY 4 HOURS PRN
Status: DISCONTINUED | OUTPATIENT
Start: 2023-12-01 | End: 2023-12-03 | Stop reason: HOSPADM

## 2023-12-01 RX ORDER — POLYETHYLENE GLYCOL 3350 17 G/17G
17 POWDER, FOR SOLUTION ORAL DAILY PRN
Status: DISCONTINUED | OUTPATIENT
Start: 2023-12-01 | End: 2023-12-03 | Stop reason: HOSPADM

## 2023-12-01 RX ORDER — ONDANSETRON 2 MG/ML
4 INJECTION INTRAMUSCULAR; INTRAVENOUS EVERY 6 HOURS PRN
Status: DISCONTINUED | OUTPATIENT
Start: 2023-12-01 | End: 2023-12-03 | Stop reason: HOSPADM

## 2023-12-01 RX ORDER — NALOXONE HCL 0.4 MG/ML
0.4 VIAL (ML) INJECTION
Status: DISCONTINUED | OUTPATIENT
Start: 2023-12-01 | End: 2023-12-03 | Stop reason: HOSPADM

## 2023-12-01 RX ORDER — SODIUM CHLORIDE 0.9 % (FLUSH) 0.9 %
10 SYRINGE (ML) INJECTION AS NEEDED
Status: DISCONTINUED | OUTPATIENT
Start: 2023-12-01 | End: 2023-12-03 | Stop reason: HOSPADM

## 2023-12-01 RX ORDER — ACETAMINOPHEN 325 MG/1
650 TABLET ORAL EVERY 4 HOURS PRN
Status: DISCONTINUED | OUTPATIENT
Start: 2023-12-01 | End: 2023-12-03 | Stop reason: HOSPADM

## 2023-12-01 RX ORDER — BISACODYL 10 MG
10 SUPPOSITORY, RECTAL RECTAL DAILY PRN
Status: DISCONTINUED | OUTPATIENT
Start: 2023-12-01 | End: 2023-12-03 | Stop reason: HOSPADM

## 2023-12-01 RX ORDER — INSULIN LISPRO 100 [IU]/ML
2-7 INJECTION, SOLUTION INTRAVENOUS; SUBCUTANEOUS
Status: DISCONTINUED | OUTPATIENT
Start: 2023-12-01 | End: 2023-12-03 | Stop reason: HOSPADM

## 2023-12-01 RX ORDER — BISACODYL 5 MG/1
5 TABLET, DELAYED RELEASE ORAL DAILY PRN
Status: DISCONTINUED | OUTPATIENT
Start: 2023-12-01 | End: 2023-12-03 | Stop reason: HOSPADM

## 2023-12-01 RX ORDER — NICOTINE POLACRILEX 4 MG
15 LOZENGE BUCCAL
Status: DISCONTINUED | OUTPATIENT
Start: 2023-12-01 | End: 2023-12-03 | Stop reason: HOSPADM

## 2023-12-01 RX ORDER — ACETAMINOPHEN 650 MG/1
650 SUPPOSITORY RECTAL EVERY 4 HOURS PRN
Status: DISCONTINUED | OUTPATIENT
Start: 2023-12-01 | End: 2023-12-03 | Stop reason: HOSPADM

## 2023-12-01 RX ORDER — PANTOPRAZOLE SODIUM 40 MG/10ML
40 INJECTION, POWDER, LYOPHILIZED, FOR SOLUTION INTRAVENOUS EVERY 12 HOURS SCHEDULED
Status: DISCONTINUED | OUTPATIENT
Start: 2023-12-01 | End: 2023-12-03 | Stop reason: HOSPADM

## 2023-12-01 RX ORDER — ACETAMINOPHEN 160 MG/5ML
650 SOLUTION ORAL EVERY 4 HOURS PRN
Status: DISCONTINUED | OUTPATIENT
Start: 2023-12-01 | End: 2023-12-03 | Stop reason: HOSPADM

## 2023-12-01 RX ORDER — HYDROMORPHONE HYDROCHLORIDE 1 MG/ML
0.5 INJECTION, SOLUTION INTRAMUSCULAR; INTRAVENOUS; SUBCUTANEOUS
Status: DISCONTINUED | OUTPATIENT
Start: 2023-12-01 | End: 2023-12-03 | Stop reason: HOSPADM

## 2023-12-01 RX ORDER — IBUPROFEN 600 MG/1
1 TABLET ORAL
Status: DISCONTINUED | OUTPATIENT
Start: 2023-12-01 | End: 2023-12-03 | Stop reason: HOSPADM

## 2023-12-01 RX ORDER — SODIUM CHLORIDE 9 MG/ML
40 INJECTION, SOLUTION INTRAVENOUS AS NEEDED
Status: DISCONTINUED | OUTPATIENT
Start: 2023-12-01 | End: 2023-12-03 | Stop reason: HOSPADM

## 2023-12-01 RX ORDER — DEXTROSE MONOHYDRATE 25 G/50ML
25 INJECTION, SOLUTION INTRAVENOUS
Status: DISCONTINUED | OUTPATIENT
Start: 2023-12-01 | End: 2023-12-03 | Stop reason: HOSPADM

## 2023-12-01 RX ORDER — AMOXICILLIN 250 MG
2 CAPSULE ORAL 2 TIMES DAILY
Status: DISCONTINUED | OUTPATIENT
Start: 2023-12-01 | End: 2023-12-03 | Stop reason: HOSPADM

## 2023-12-01 RX ORDER — SODIUM CHLORIDE 0.9 % (FLUSH) 0.9 %
10 SYRINGE (ML) INJECTION EVERY 12 HOURS SCHEDULED
Status: DISCONTINUED | OUTPATIENT
Start: 2023-12-01 | End: 2023-12-03 | Stop reason: HOSPADM

## 2023-12-01 RX ORDER — HEPARIN SODIUM 5000 [USP'U]/ML
5000 INJECTION, SOLUTION INTRAVENOUS; SUBCUTANEOUS EVERY 8 HOURS SCHEDULED
Status: DISCONTINUED | OUTPATIENT
Start: 2023-12-01 | End: 2023-12-03 | Stop reason: HOSPADM

## 2023-12-01 RX ADMIN — Medication 10 ML: at 21:27

## 2023-12-01 RX ADMIN — PANTOPRAZOLE SODIUM 40 MG: 40 INJECTION, POWDER, LYOPHILIZED, FOR SOLUTION INTRAVENOUS at 21:26

## 2023-12-01 RX ADMIN — LINAGLIPTIN 5 MG: 5 TABLET, FILM COATED ORAL at 08:19

## 2023-12-01 RX ADMIN — INSULIN LISPRO 3 UNITS: 100 INJECTION, SOLUTION INTRAVENOUS; SUBCUTANEOUS at 21:26

## 2023-12-01 RX ADMIN — Medication 10 ML: at 08:19

## 2023-12-01 RX ADMIN — SENNOSIDES AND DOCUSATE SODIUM 2 TABLET: 8.6; 5 TABLET ORAL at 21:26

## 2023-12-01 RX ADMIN — DOCUSATE SODIUM 50 MG AND SENNOSIDES 8.6 MG 2 TABLET: 8.6; 5 TABLET, FILM COATED ORAL at 08:19

## 2023-12-01 RX ADMIN — Medication 1000 UNITS: at 08:19

## 2023-12-01 RX ADMIN — SODIUM CHLORIDE 75 ML/HR: 9 INJECTION, SOLUTION INTRAVENOUS at 16:19

## 2023-12-01 NOTE — PLAN OF CARE
Goal Outcome Evaluation:              Outcome Evaluation: Pt resting in bed at this time. No s/s of distress noted. Pt is waiting on a bed at [facility]. No complaints of pain or nausea this shift. Pt has ambulated indpendently. Will continue POC.

## 2023-12-01 NOTE — NURSING NOTE
[facility] states they cannot transport patient.    Waiting on call back from [facility].

## 2023-12-01 NOTE — PLAN OF CARE
Goal Outcome Evaluation:           Progress: no change  Outcome Evaluation: Pt transfered from [facility] for ERCP/lap choley. A & o x4, denies pain, VSS, wife at bedside.

## 2023-12-01 NOTE — PROGRESS NOTES
[facility] HOSPITALIST PROGRESS NOTE     Patient Identification:  Name:  Evan Salazar  Age:  68 y.o.  Sex:  male  :  1955  MRN:  [id number]  Visit Number:  [id number]  ROOM: [address] [place] [address]     Primary Care Provider:  Sharon Dunn DO    Length of stay in inpatient status:  0    Subjective     Chief Compliant:    Chief Complaint   Patient presents with    Abdominal Pain       History of Presenting Illness: Patient seen and evaluated in follow-up for right upper quadrant pain with acute hepatitis likely secondary to choledocholithiasis given laboratory abnormalities.  Patient at time of exam n.p.o. in anticipation of possible surgical intervention however bilirubin this morning with increasing direct bilirubin.  Discussed with patient and general surgery recommendations and agreeable to transfer to outside facility for ERCP.    Objective     Current Hospital Meds:  cholecalciferol, 1,000 Units, Oral, Daily  heparin (porcine), 5,000 Units, Subcutaneous, Q12H  insulin lispro, 2-7 Units, Subcutaneous, 4x Daily AC & at Bedtime  linagliptin, 5 mg, Oral, Daily  lisinopril, 5 mg, Oral, Daily  pantoprazole, 40 mg, Oral, Q AM  senna-docusate sodium, 2 tablet, Oral, BID  sodium chloride, 10 mL, Intravenous, Q12H      lactated ringers, 100 mL/hr, Last Rate: 100 mL/hr (23 0856)      ----------------------------------------------------------------------------------------------------------------------  Vital Signs:  Temp:  [98.1 °F (36.7 °C)-98.7 °F (37.1 °C)] 98.3 °F (36.8 °C)  Heart Rate:  [58-95] 61  Resp:  [16-18] 18  BP: ()/(53-69) 136/69  SpO2:  [91 %-97 %] 97 %  on   ;   Device (Oxygen Therapy): room air  Body mass index is 23.92 kg/m².      Intake/Output Summary (Last 24 hours) at 2023 1920  Last data filed at 2023 1600  Gross per 24 hour   Intake 2631.92 ml   Output --   Net 2631.92 ml     "  ----------------------------------------------------------------------------------------------------------------------  Physical exam:  Constitutional:  Well-developed and well-nourished.  No acute distress.      HENT:  Head:  Normocephalic and atraumatic.  Mouth:  Moist mucous membranes.    Eyes:  Conjunctivae and EOM are normal. No scleral icterus.    Neck:  Neck supple.  No JVD present.    Cardiovascular:  Normal rate, regular rhythm and normal heart sounds with no murmur.  Pulmonary/Chest:  No respiratory distress, no wheezes, no crackles, with normal breath sounds and good air movement.  Abdominal:  Soft.  Bowel sounds are hyperactive.  There is mild distention with tenderness to palpation of the superior aspect of the right upper quadrant.   Musculoskeletal:  No tenderness or deformity.  No red or swollen joints anywhere.  Functional ROM intact.   Neurological:  Alert and oriented to person, place, and time.  No cranial nerve deficit.  No tongue deviation.  No facial droop.  No slurred speech. Intact Sensation throughout  Skin:  Skin is warm and dry. No rash or lesion noted. No pallor.   Peripheral vascular:  Pulses in all 4 extremities with no clubbing, no cyanosis, no edema.  Psychiatric: Appropriate mood and affect, pleasant.   ----------------------------------------------------------------------------------------------------------------------  WBC/HGB/HCT/PLT   6.98/11.9/35.4/144 (11/30 0106)  BUN/CREAT/GLUC/ALT/AST/RUPERT/LIP    16/1.32/124/227/103/--/-- (11/30 0106)  LYTES - Na/K/Cl/CO2: 137/4.2/102/23.4 (11/30 0106)        No results found for: \"URINECX\"  No results found for: \"BLOODCX\"    I have personally looked at the labs and they are summarized above.  ----------------------------------------------------------------------------------------------------------------------  Detailed radiology reports for the last 24 hours:  MRI abdomen wo contrast mrcp    Result Date: 11/29/2023    Possibly layering " sludge within the gallbladder.   This report was finalized on 11/29/2023 12:27 PM by Dr. Lexx Turk MD.      US Gallbladder    Result Date: 11/28/2023  Impression: 1. No acute process.   This report was finalized on 11/28/2023 10:24 PM by Daniel Mcelroy DO.     Assessment & Plan      Acute hepatitis  Right upper quadrant pain  Suspected recently passed gallstone    -Ultrasound of the gallbladder as well as CT of the abdomen pelvis all reviewed and with no significant findings.  However given patient's persistently elevated bilirubin MRCP ordered which revealed no obstructing stone.    -Repeat labs today show improving AST and ALT and patient still having some abdominal pain improved with n.p.o. status.  Discussed with general surgery and given increasing direct hyperbilirubin concern for possible stricture or space-occupying lesion causing obstructed flow and would benefit from ERCP before any decision regarding cholecystectomy.    -Called and spoke with hospitalist service at [facility] who discussed with GI services and agreeable to see patient and patient subsequently accepted for transfer pending bed availability.    -Acute hepatitis panel negative.    Diabetes mellitus type 2    -Basal and bolus insulin as needed to maintain glycemic control.    Essential hypertension    -Continue home medications    Copied text in portions of the note has been reviewed and is accurate as of 11/30/23    VTE Prophylaxis:   Mechanical Order History:       None          Pharmalogical Order History:        Ordered     Dose Route Frequency Stop    11/29/23 0025  heparin (porcine) 5000 UNIT/ML injection 5,000 Units         5,000 Units SC Every 12 Hours Scheduled --                    Disposition awaiting bed at higher level facility for ERCP    Nikolay Calles DO  [facility] Hospitalist  11/30/23  19:20 EST

## 2023-12-01 NOTE — CASE MANAGEMENT/SOCIAL WORK
Discharge Planning Assessment  [facility]     Patient Name: Evan Salazar  MRN: [id number]  Today's Date: 12/1/2023    Admit Date: 11/28/2023          Discharge Plan       Row Name 12/01/23 0945       Plan    Final Discharge Disposition Code 02 - short term hospital for  care    Final Note Pt being transferred to [facility] on this date.               GRETA Pardo

## 2023-12-01 NOTE — H&P
[facility] Medicine Services  HISTORY AND PHYSICAL    Patient Name: Evan Salazar  : 1955  MRN: [id number]  Primary Care Physician: Sharon Dunn DO  Date of admission: 2023      Subjective   Subjective     Chief Complaint:  Jaundice and abdominal pain    HPI:  Evan Salazar is a 68 y.o. male with PMHs of essential hypertension, type 2 DM, dyslipidemia, BPH, and Anxiety, who presented to the hospital as a transfer from [facility] for GI evaluation for obstructive jaundice. Patient was admitted to [facility] on 2023 for intermittent right upper quadrant abdominal pain and hepatocellular/obstructive jaundice.  CT abdomen was concerning for stone in CBD.  MRCP with gallbladder sludge with no evidence of CBD dilation or stones.  Lab work-up was concerning for obstructive jaundice.  General surgery team evaluated the patient at [facility] and recommended ERCP prior to cholecystectomy to exclude choledocholithiasis.  Patient was transferred to this hospital for GI evaluation    At the time of the encounter, the patient is comfortable.  No abdominal pain.  No chest pain.  No fever.  Explained to him the plan of care.      Personal History     Past Medical History:   Diagnosis Date    Anxiety     Arthritis     Benign fibroma of prostate 2023    COVID-19 2021    Received Casirivimab,Imdevimab in the ED; not hospitalized    Epididymitis     GERD (gastroesophageal reflux disease)     Heartburn     Hydrocele     Kidney stone     Mixed hyperlipidemia 2023    Primary hypertension 2023    Type 2 diabetes mellitus     Vitamin D deficiency 2023             Past Surgical History:   Procedure Laterality Date    COLONOSCOPY      CYSTOSCOPY W/ URETEROSCOPY W/ LITHOTRIPSY      FINGER SURGERY      HAND SURGERY      HERNIA REPAIR      SCROTAL EXPLORATION Left 2018    Procedure: SCROTAL EXPLORATION/ INGUINAL EXPLORATION  HEMIORCHICETOMY WITH FROZEN SECTION.;  Surgeon: Edvin Mars MD;  [facility];  Service: Urology       Family History: family history includes Diabetes in his mother; No Known Problems in his father.     Social History:  reports that he has never smoked. He has never been exposed to tobacco smoke. He has never used smokeless tobacco. He reports that he does not drink alcohol and does not use drugs.  Social History     Social History Narrative    Not on file       Medications:  Available home medication information reviewed.  Medications Prior to Admission   Medication Sig Dispense Refill Last Dose    cholecalciferol (VITAMIN D3) 25 MCG (1000 UT) tablet Take 1 tablet by mouth Daily.   12/1/2023    esomeprazole (nexIUM) 40 MG capsule Take 1 capsule by mouth Every Morning Before Breakfast.   Past Week    lisinopril (PRINIVIL,ZESTRIL) 5 MG tablet TAKE 1 TABLET BY MOUTH DAILY 30 tablet 3 12/1/2023    metFORMIN ER (GLUCOPHAGE-XR) 500 MG 24 hr tablet Take 2 tablets by mouth Daily With Breakfast & Dinner.   Past Week    omega-3 acid ethyl esters (LOVAZA) 1 g capsule TAKE TWO CAPSULE BY MOUTH TWICE DAILY   Past Week    SITagliptin (JANUVIA) 100 MG tablet Take 1 tablet by mouth Daily.   Past Week       Allergies   Allergen Reactions    Bactrim [Sulfamethoxazole-Trimethoprim] Rash and Other (See Comments)     Flu like symptoms       Objective   Objective     Vital Signs:   Temp:  [97.6 °F (36.4 °C)-98.9 °F (37.2 °C)] 98.8 °F (37.1 °C)  Heart Rate:  [58-62] 58  Resp:  [18] 18  BP: (132-145)/(64-85) 132/85       Physical Exam   General: Comfortable, not in distress, conversant and cooperative  Head: Atraumatic and normocephalic  Eyes: No Icterus. No pallor  Ears:  Ears appear intact with no abnormalities noted  Throat: No oral lesions, no thrush  Neck: Supple, trachea midline  Lungs: Clear to auscultation bilaterally, equal air entry, no wheezing or crackles  Heart:  Normal S1 and S2, no murmur, no gallop, No  JVD, no lower extremity swelling  Abdomen:  Soft, no tenderness, no organomegaly, normal bowel sounds, no organomegaly  Extremities: pulses equal bilaterally  Skin: No bleeding, bruising or rash, normal skin turgor and elasticity  Neurologic: Cranial nerves appear intact with no evidence of facial asymmetry, normal motor and sensory functions in all 4 extremities  Psych: Alert and oriented x 3, normal mood    Result Review:  I have personally reviewed the results from the time of this admission to 12/1/2023 14:34 EST and agree with these findings:  [x]  Laboratory list / accordion  []  Microbiology  [x]  Radiology  [x]  EKG/Telemetry   []  Cardiology/Vascular   [x]  Pathology  [x]  Old records      LAB RESULTS:      Lab 11/30/23 0106 11/29/23 0728 11/28/23 2046 11/28/23  1818   WBC 6.98  --   --  7.99   HEMOGLOBIN 11.9*  --   --  13.0   HEMATOCRIT 35.4*  --   --  38.9   PLATELETS 144  --   --  181   NEUTROS ABS  --   --   --  6.58   IMMATURE GRANS (ABS)  --   --   --  0.02   LYMPHS ABS  --   --   --  0.66*   MONOS ABS  --   --   --  0.67   EOS ABS  --   --   --  0.03   MCV 89.2  --   --  87.8   LACTATE  --   --  1.8 2.1*   PROTIME  --  13.7  --  13.0   INR  --  1.00  --  0.93   APTT  --  28.3  --   --          Lab 12/01/23  0152 11/30/23  0106 11/29/23 0728 11/28/23  1818   SODIUM 141 137 136 139   POTASSIUM 4.4 4.2 4.4 4.4   CHLORIDE 105 102 102 104   CO2 26.0 23.4 23.8 23.9   ANION GAP 10.0 11.6 10.2 11.1   BUN 15 16 16 20   CREATININE 1.44* 1.32* 1.21 1.24   EGFR 53.3* 59.1* 65.6 63.7   GLUCOSE 168* 124* 141* 139*   CALCIUM 8.9 8.6 8.9 9.3         Lab 12/01/23  0152 11/30/23  0106 11/29/23  1739 11/29/23 0728 11/28/23  1818   TOTAL PROTEIN 5.8* 5.5*  --  6.1 6.7   ALBUMIN 3.3* 3.4*  --  3.8 4.4   GLOBULIN 2.5 2.1  --  2.3 2.3   ALT (SGPT) 186* 227*  --  309* 386*   AST (SGOT) 76* 103*  --  210* 402*   BILIRUBIN 3.4* 4.2* 4.2* 4.3* 3.1*   INDIRECT BILIRUBIN  --   --  0.6  --   --    BILIRUBIN DIRECT  --  3.7*  3.6*  --  1.8*   ALK PHOS 194* 162*  --  154* 144*   LIPASE  --   --   --   --  27         Lab 11/29/23  0009 11/28/23  1818   HSTROP T 21 9                 UA          11/28/2023    18:18   Urinalysis   Specific Gravity, UA 1.023    Ketones, UA Trace    Blood, UA Negative    Leukocytes, UA Negative    Nitrite, UA Negative        Microbiology Results (last 10 days)       ** No results found for the last 240 hours. **            No radiology results from the last 24 hrs    Results for orders placed during the hospital encounter of 09/07/22    Adult Transthoracic Echo Complete W/ Cont if Necessary Per Protocol    Interpretation Summary  · Left ventricular wall thickness is consistent with mild concentric hypertrophy.  · Left ventricular ejection fraction appears to be 66 - 70%. Left ventricular systolic function is normal.  · The cardiac chamber dimensions are normal.  · No significant valvular abnormality is present.  · There is no evidence of pericardial effusion.      Assessment & Plan   Assessment & Plan     There are no active hospital problems to display for this patient.    Summary:  Evan Salazar is a 68 y.o. male with PMHs of essential hypertension, type 2 DM, dyslipidemia, BPH, and Anxiety, who presented to the hospital as a transfer from [facility] for GI evaluation for obstructive jaundice. Patient was admitted to [facility] on 11/28/2023 for intermittent right upper quadrant abdominal pain and hepatocellular/obstructive jaundice.  CT abdomen was concerning for stone in CBD.  MRCP with gallbladder sludge with no evidence of CBD dilation or stones.  Lab work-up was concerning for obstructive jaundice.  General surgery team evaluated the patient at [facility] and recommended ERCP prior to cholecystectomy to exclude choledocholithiasis.  Patient was transferred to this hospital for GI evaluation    Obstructive jaundice, improving  Elevated liver enzymes  Patient presented to  [facility] 11/28/2023 with right upper quadrant abdominal pain concerning for biliary colic  Liver enzymes and bilirubin  (mainly direct ) were elevated but trending down  MRCP 11/29/2023 with biliary sludge with no evidence of choledocholithiasis  Patient was seen and evaluated by general surgery team at [facility] who recommended to transfer the patient to this facility for GI evaluation for ERCP prior to cholecystectomy  Liver enzymes are trending down as well as bilirubin (bilirubin is 3.4 today down from 4.2 yesterday).  This might be concerning for a stone that passed  Hepatitis panel negative  Consult GI team for ERCP  Consult general surgery team for cholecystectomy  N.p.o. from midnight    Mild JENISE, likely prerenal  Baseline creatinine 1.0.  Creatinine today is 1.44  UA with trace proteinuria and hyper bilirubinuria  CT abdomen with no obstructive uropathy  Hold metformin and lisinopril  IV fluids and monitor renal functions    Essential hypertension  Mainly controlled with diet   Holding low-dose lisinopril 5 mg given JENISE    Type 2 diabetes  A1c  SSI        DVT prophylaxis: Heparin      CODE STATUS: Full  There are no questions and answers to display.       Expected Discharge   Expected discharge date/ time has not been documented.     Gildardo Fulton MD  12/01/23

## 2023-12-01 NOTE — PLAN OF CARE
Goal Outcome Evaluation:              Outcome Evaluation: Patient being discharged to [facility].

## 2023-12-01 NOTE — DISCHARGE SUMMARY
"    [facility] HOSPITALISTS DISCHARGE SUMMARY    Patient Identification:  Name:  Evan Salazar  Age:  68 y.o.  Sex:  male  :  1955  MRN:  [id number]  Visit Number:  [id number]    Date of Admission: 2023  Date of Discharge:  2023     PCP: Sharon Dunn, DO    DISCHARGE DIAGNOSIS  Acute hepatitis  Right upper quadrant pain  Possible recently passed gallstone  Diabetes mellitus type 2  Essential hypertension    CONSULTS   General Surgery     PROCEDURES PERFORMED      HOSPITAL COURSE  Patient is a 68 y.o. male presented to [facility] complaining of abdominal pain.  Please see the admitting history and physical for further details.      Patient reported right upper quadrant abdominal pain at presentation to the ER and stated that he felt like his \"gallbladder was acting up \"at initial evaluation.  Pain began the morning of his presentation and been unremitting throughout the day throughout going to work and decided to come to the hospital for further evaluation.  Patient had a similar episode a month prior but had not sought medical attention.  He reported the pain is 10 out of 10 in severity but denied any nausea or vomiting initially.  However after admission to the hospitalist service for further evaluation and treatment as well as consultation by general surgery patient did have recurrence of nausea with abdominal pain.  Ischial evaluation was significant for hyperbilirubinemia with evidence of acute hepatitis with elevated AST and ALT and elevated bilirubin in the setting of no prior liver disease.  Acute hepatitis panel was negative.  A direct and indirect bilirubin level was checked which revealed a elevated bilirubin at 3.1 with direct 1.8.  Patient with lab work 3 months prior with normal bilirubin and normal liver function.  Given this patient was sent for MRCP as general surgery reviewed plain film imaging and was concern for the possible appearance of a stone on CT " I placed a derm referral  I have no expertise in the area of hair loss    Itzel Phillip [facility]  [phone]   imaging.  MRCP ultimately returned negative and general surgery at first feeling possible recently passed gallstone however patient's bilirubin Over the next 24 hours trending continue to trend up with total bilirubin rising to 4.2 with peak direct bilirubin at 3.7.  General surgery recommended evaluation by ERCP before any consideration of proceeding with cholecystectomy to evaluate and rule out any stricture or intraductal lesion.  Due to inability of having the services here at the hospital outside hospital facilities were contacted for need for ERCP and patient ultimately accepted and assigned bed at [facility].  Patient remained with intermittent abdominal pain and nausea and was placed on clear liquid diet while in hospital with n.p.o. overnight the day prior to his discharge so as to help expedite any plan ERCP once arriving at outside facility.  Nursing staff notified of patient having assigned bed on 12/1/2023 and patient subsequently discharged in stable medical condition for ERCP evaluation.    VITAL SIGNS:  Temp:  [97.6 °F (36.4 °C)-98.9 °F (37.2 °C)] 98.9 °F (37.2 °C)  Heart Rate:  [58-62] 61  Resp:  [18] 18  BP: (136-145)/(60-71) 141/71  SpO2:  [96 %-97 %] 97 %  on   ;   Device (Oxygen Therapy): room air    Body mass index is 24.25 kg/m².  Wt Readings from Last 3 Encounters:   12/01/23 74.5 kg (164 lb 3.9 oz)   10/04/23 77.3 kg (170 lb 6.4 oz)   08/21/23 74.8 kg (165 lb)       PHYSICAL EXAM:  Constitutional:  Well-developed and well-nourished.  No acute distress.      HENT:  Head:  Normocephalic and atraumatic.  Mouth:  Moist mucous membranes.    Eyes:  Conjunctivae and EOM are normal. No scleral icterus.    Neck:  Neck supple.  No JVD present.    Cardiovascular:  Normal rate, regular rhythm and normal heart sounds with no murmur.  Pulmonary/Chest:  No respiratory distress, no wheezes, no crackles, with normal breath sounds and good air movement.  Abdominal:  Soft.  Bowel sounds are  normal.  There is mild distention with tenderness to palpation of the superior aspect of the right upper quadrant.   Musculoskeletal:  No tenderness or deformity.  No red or swollen joints anywhere.  Functional ROM intact.   Neurological:  Alert and oriented to person, place, and time.  No cranial nerve deficit.  No tongue deviation.  No facial droop.  No slurred speech. Intact Sensation throughout  Skin:  Skin is warm and dry. No rash or lesion noted. No pallor.   Peripheral vascular:  Pulses in all 4 extremities with no clubbing, no cyanosis, no edema.  Psychiatric: Appropriate mood and affect, pleasant.     DISCHARGE DISPOSITION   Stable    DISCHARGE MEDICATIONS:     Discharge Medications        Continue These Medications        Instructions Start Date   cholecalciferol 25 MCG (1000 UT) tablet  Commonly known as: VITAMIN D3   1,000 Units, Oral, Daily      esomeprazole 40 MG capsule  Commonly known as: nexIUM   40 mg, Oral, Every Morning Before Breakfast      lisinopril 5 MG tablet  Commonly known as: PRINIVIL,ZESTRIL   5 mg, Oral, Daily      metFORMIN  MG 24 hr tablet  Commonly known as: GLUCOPHAGE-XR   1,000 mg, Oral, Daily With Breakfast & Dinner      omega-3 acid ethyl esters 1 g capsule  Commonly known as: LOVAZA   TAKE TWO CAPSULE BY MOUTH TWICE DAILY      SITagliptin 100 MG tablet  Commonly known as: JANUVIA   100 mg, Oral, Daily                  Follow-up Information       Sharon Dunn DO .    Specialty: Internal Medicine  Contact information:  [address] [place] [id number] [phone]                              TEST  RESULTS PENDING AT DISCHARGE       CODE STATUS  Code Status and Medical Interventions:   Ordered at: 11/29/23 0639     Level Of Support Discussed With:    Patient     Code Status (Patient has no pulse and is not breathing):    CPR (Attempt to Resuscitate)     Medical Interventions (Patient has pulse or is breathing):    Full Support       Nikolay Calles DO  [facility]  [facility]  12/01/23  09:06 EST    Please note that this discharge summary required more than 30 minutes to complete.

## 2023-12-01 NOTE — NURSING NOTE
Contacted [facility] who states that they can transport pt to [facility].

## 2023-12-02 ENCOUNTER — APPOINTMENT (OUTPATIENT)
Dept: GENERAL RADIOLOGY | Facility: HOSPITAL | Age: 68
DRG: 417 | End: 2023-12-02
Payer: MEDICARE

## 2023-12-02 ENCOUNTER — ANESTHESIA EVENT (OUTPATIENT)
Dept: PERIOP | Facility: HOSPITAL | Age: 68
End: 2023-12-02
Payer: MEDICARE

## 2023-12-02 ENCOUNTER — ANESTHESIA (OUTPATIENT)
Dept: PERIOP | Facility: HOSPITAL | Age: 68
End: 2023-12-02
Payer: MEDICARE

## 2023-12-02 LAB
ALBUMIN SERPL-MCNC: 3.7 G/DL (ref 3.5–5.2)
ALBUMIN/GLOB SERPL: 2.1 G/DL
ALP SERPL-CCNC: 212 U/L (ref 39–117)
ALT SERPL W P-5'-P-CCNC: 152 U/L (ref 1–41)
ANION GAP SERPL CALCULATED.3IONS-SCNC: 10 MMOL/L (ref 5–15)
AST SERPL-CCNC: 56 U/L (ref 1–40)
BASOPHILS # BLD AUTO: 0.04 10*3/MM3 (ref 0–0.2)
BASOPHILS NFR BLD AUTO: 0.7 % (ref 0–1.5)
BILIRUB SERPL-MCNC: 1.5 MG/DL (ref 0–1.2)
BUN SERPL-MCNC: 21 MG/DL (ref 8–23)
BUN/CREAT SERPL: 15.6 (ref 7–25)
CALCIUM SPEC-SCNC: 8.5 MG/DL (ref 8.6–10.5)
CHLORIDE SERPL-SCNC: 107 MMOL/L (ref 98–107)
CO2 SERPL-SCNC: 22 MMOL/L (ref 22–29)
CREAT SERPL-MCNC: 1.35 MG/DL (ref 0.76–1.27)
DEPRECATED RDW RBC AUTO: 42.5 FL (ref 37–54)
EGFRCR SERPLBLD CKD-EPI 2021: 57.5 ML/MIN/1.73
EOSINOPHIL # BLD AUTO: 0.22 10*3/MM3 (ref 0–0.4)
EOSINOPHIL NFR BLD AUTO: 4 % (ref 0.3–6.2)
ERYTHROCYTE [DISTWIDTH] IN BLOOD BY AUTOMATED COUNT: 13.1 % (ref 12.3–15.4)
GLOBULIN UR ELPH-MCNC: 1.8 GM/DL
GLUCOSE BLDC GLUCOMTR-MCNC: 113 MG/DL (ref 70–130)
GLUCOSE BLDC GLUCOMTR-MCNC: 121 MG/DL (ref 70–130)
GLUCOSE BLDC GLUCOMTR-MCNC: 127 MG/DL (ref 70–130)
GLUCOSE BLDC GLUCOMTR-MCNC: 174 MG/DL (ref 70–130)
GLUCOSE SERPL-MCNC: 133 MG/DL (ref 65–99)
HBA1C MFR BLD: 6.4 % (ref 4.8–5.6)
HCT VFR BLD AUTO: 34.2 % (ref 37.5–51)
HGB BLD-MCNC: 11.7 G/DL (ref 13–17.7)
IMM GRANULOCYTES # BLD AUTO: 0.04 10*3/MM3 (ref 0–0.05)
IMM GRANULOCYTES NFR BLD AUTO: 0.7 % (ref 0–0.5)
LYMPHOCYTES # BLD AUTO: 1.23 10*3/MM3 (ref 0.7–3.1)
LYMPHOCYTES NFR BLD AUTO: 22.1 % (ref 19.6–45.3)
MAGNESIUM SERPL-MCNC: 2 MG/DL (ref 1.6–2.4)
MCH RBC QN AUTO: 30.2 PG (ref 26.6–33)
MCHC RBC AUTO-ENTMCNC: 34.2 G/DL (ref 31.5–35.7)
MCV RBC AUTO: 88.4 FL (ref 79–97)
MONOCYTES # BLD AUTO: 0.56 10*3/MM3 (ref 0.1–0.9)
MONOCYTES NFR BLD AUTO: 10.1 % (ref 5–12)
NEUTROPHILS NFR BLD AUTO: 3.47 10*3/MM3 (ref 1.7–7)
NEUTROPHILS NFR BLD AUTO: 62.4 % (ref 42.7–76)
NRBC BLD AUTO-RTO: 0 /100 WBC (ref 0–0.2)
PHOSPHATE SERPL-MCNC: 3.7 MG/DL (ref 2.5–4.5)
PLATELET # BLD AUTO: 181 10*3/MM3 (ref 140–450)
PMV BLD AUTO: 10.1 FL (ref 6–12)
POTASSIUM SERPL-SCNC: 4.4 MMOL/L (ref 3.5–5.2)
PROT SERPL-MCNC: 5.5 G/DL (ref 6–8.5)
RBC # BLD AUTO: 3.87 10*6/MM3 (ref 4.14–5.8)
SODIUM SERPL-SCNC: 139 MMOL/L (ref 136–145)
WBC NRBC COR # BLD AUTO: 5.56 10*3/MM3 (ref 3.4–10.8)

## 2023-12-02 PROCEDURE — 85025 COMPLETE CBC W/AUTO DIFF WBC: CPT | Performed by: INTERNAL MEDICINE

## 2023-12-02 PROCEDURE — 25810000003 SODIUM CHLORIDE PER 500 ML: Performed by: SURGERY

## 2023-12-02 PROCEDURE — 84100 ASSAY OF PHOSPHORUS: CPT | Performed by: INTERNAL MEDICINE

## 2023-12-02 PROCEDURE — 63710000001 INSULIN LISPRO (HUMAN) PER 5 UNITS: Performed by: SURGERY

## 2023-12-02 PROCEDURE — 25010000002 DEXAMETHASONE PER 1 MG: Performed by: ANESTHESIOLOGY

## 2023-12-02 PROCEDURE — 82948 REAGENT STRIP/BLOOD GLUCOSE: CPT

## 2023-12-02 PROCEDURE — 25010000002 SUGAMMADEX 200 MG/2ML SOLUTION: Performed by: ANESTHESIOLOGY

## 2023-12-02 PROCEDURE — 83036 HEMOGLOBIN GLYCOSYLATED A1C: CPT | Performed by: INTERNAL MEDICINE

## 2023-12-02 PROCEDURE — 25010000002 FENTANYL CITRATE (PF) 50 MCG/ML SOLUTION: Performed by: ANESTHESIOLOGY

## 2023-12-02 PROCEDURE — 99232 SBSQ HOSP IP/OBS MODERATE 35: CPT | Performed by: STUDENT IN AN ORGANIZED HEALTH CARE EDUCATION/TRAINING PROGRAM

## 2023-12-02 PROCEDURE — 25010000002 PROPOFOL 10 MG/ML EMULSION: Performed by: ANESTHESIOLOGY

## 2023-12-02 PROCEDURE — 25010000002 CEFAZOLIN PER 500 MG: Performed by: ANESTHESIOLOGY

## 2023-12-02 PROCEDURE — 0FT44ZZ RESECTION OF GALLBLADDER, PERCUTANEOUS ENDOSCOPIC APPROACH: ICD-10-PCS | Performed by: SURGERY

## 2023-12-02 PROCEDURE — 25010000002 ESMOLOL 100 MG/10ML SOLUTION: Performed by: ANESTHESIOLOGY

## 2023-12-02 PROCEDURE — 88304 TISSUE EXAM BY PATHOLOGIST: CPT | Performed by: SURGERY

## 2023-12-02 PROCEDURE — 25810000003 LACTATED RINGERS PER 1000 ML: Performed by: ANESTHESIOLOGY

## 2023-12-02 PROCEDURE — 25010000002 HYDROMORPHONE 1 MG/ML SOLUTION

## 2023-12-02 PROCEDURE — 25810000003 SODIUM CHLORIDE 0.9 % SOLUTION: Performed by: SURGERY

## 2023-12-02 PROCEDURE — 25010000002 FENTANYL CITRATE (PF) 50 MCG/ML SOLUTION

## 2023-12-02 PROCEDURE — 83735 ASSAY OF MAGNESIUM: CPT | Performed by: INTERNAL MEDICINE

## 2023-12-02 PROCEDURE — 25810000003 SODIUM CHLORIDE 0.9 % SOLUTION: Performed by: INTERNAL MEDICINE

## 2023-12-02 PROCEDURE — 25010000002 HYDROMORPHONE PER 4 MG: Performed by: SURGERY

## 2023-12-02 PROCEDURE — 80053 COMPREHEN METABOLIC PANEL: CPT | Performed by: INTERNAL MEDICINE

## 2023-12-02 RX ORDER — HYDRALAZINE HYDROCHLORIDE 20 MG/ML
5 INJECTION INTRAMUSCULAR; INTRAVENOUS
Status: DISCONTINUED | OUTPATIENT
Start: 2023-12-02 | End: 2023-12-03 | Stop reason: HOSPADM

## 2023-12-02 RX ORDER — FENTANYL CITRATE 50 UG/ML
50 INJECTION, SOLUTION INTRAMUSCULAR; INTRAVENOUS
Status: DISCONTINUED | OUTPATIENT
Start: 2023-12-02 | End: 2023-12-03 | Stop reason: HOSPADM

## 2023-12-02 RX ORDER — ONDANSETRON 2 MG/ML
4 INJECTION INTRAMUSCULAR; INTRAVENOUS ONCE AS NEEDED
Status: DISCONTINUED | OUTPATIENT
Start: 2023-12-02 | End: 2023-12-03 | Stop reason: HOSPADM

## 2023-12-02 RX ORDER — HYDROMORPHONE HYDROCHLORIDE 1 MG/ML
0.5 INJECTION, SOLUTION INTRAMUSCULAR; INTRAVENOUS; SUBCUTANEOUS
Status: DISCONTINUED | OUTPATIENT
Start: 2023-12-02 | End: 2023-12-03 | Stop reason: HOSPADM

## 2023-12-02 RX ORDER — FENTANYL CITRATE 50 UG/ML
INJECTION, SOLUTION INTRAMUSCULAR; INTRAVENOUS AS NEEDED
Status: DISCONTINUED | OUTPATIENT
Start: 2023-12-02 | End: 2023-12-02 | Stop reason: SURG

## 2023-12-02 RX ORDER — DEXAMETHASONE SODIUM PHOSPHATE 4 MG/ML
INJECTION, SOLUTION INTRA-ARTICULAR; INTRALESIONAL; INTRAMUSCULAR; INTRAVENOUS; SOFT TISSUE AS NEEDED
Status: DISCONTINUED | OUTPATIENT
Start: 2023-12-02 | End: 2023-12-02 | Stop reason: SURG

## 2023-12-02 RX ORDER — LIDOCAINE HYDROCHLORIDE 10 MG/ML
INJECTION, SOLUTION EPIDURAL; INFILTRATION; INTRACAUDAL; PERINEURAL AS NEEDED
Status: DISCONTINUED | OUTPATIENT
Start: 2023-12-02 | End: 2023-12-02 | Stop reason: SURG

## 2023-12-02 RX ORDER — BUPIVACAINE HYDROCHLORIDE AND EPINEPHRINE 5; 5 MG/ML; UG/ML
INJECTION, SOLUTION PERINEURAL AS NEEDED
Status: DISCONTINUED | OUTPATIENT
Start: 2023-12-02 | End: 2023-12-02 | Stop reason: HOSPADM

## 2023-12-02 RX ORDER — GLYCOPYRROLATE 0.2 MG/ML
INJECTION INTRAMUSCULAR; INTRAVENOUS AS NEEDED
Status: DISCONTINUED | OUTPATIENT
Start: 2023-12-02 | End: 2023-12-02 | Stop reason: SURG

## 2023-12-02 RX ORDER — SODIUM CHLORIDE, SODIUM LACTATE, POTASSIUM CHLORIDE, CALCIUM CHLORIDE 600; 310; 30; 20 MG/100ML; MG/100ML; MG/100ML; MG/100ML
INJECTION, SOLUTION INTRAVENOUS CONTINUOUS PRN
Status: DISCONTINUED | OUTPATIENT
Start: 2023-12-02 | End: 2023-12-02 | Stop reason: SURG

## 2023-12-02 RX ORDER — PROPOFOL 10 MG/ML
VIAL (ML) INTRAVENOUS AS NEEDED
Status: DISCONTINUED | OUTPATIENT
Start: 2023-12-02 | End: 2023-12-02 | Stop reason: SURG

## 2023-12-02 RX ORDER — SODIUM CHLORIDE 9 MG/ML
INJECTION, SOLUTION INTRAVENOUS AS NEEDED
Status: DISCONTINUED | OUTPATIENT
Start: 2023-12-02 | End: 2023-12-02 | Stop reason: HOSPADM

## 2023-12-02 RX ORDER — SODIUM CHLORIDE 9 MG/ML
75 INJECTION, SOLUTION INTRAVENOUS CONTINUOUS
Status: DISCONTINUED | OUTPATIENT
Start: 2023-12-02 | End: 2023-12-03 | Stop reason: HOSPADM

## 2023-12-02 RX ORDER — ROCURONIUM BROMIDE 10 MG/ML
INJECTION, SOLUTION INTRAVENOUS AS NEEDED
Status: DISCONTINUED | OUTPATIENT
Start: 2023-12-02 | End: 2023-12-02 | Stop reason: SURG

## 2023-12-02 RX ORDER — MAGNESIUM HYDROXIDE 1200 MG/15ML
LIQUID ORAL AS NEEDED
Status: DISCONTINUED | OUTPATIENT
Start: 2023-12-02 | End: 2023-12-02 | Stop reason: HOSPADM

## 2023-12-02 RX ORDER — ESMOLOL HYDROCHLORIDE 10 MG/ML
INJECTION INTRAVENOUS AS NEEDED
Status: DISCONTINUED | OUTPATIENT
Start: 2023-12-02 | End: 2023-12-02 | Stop reason: SURG

## 2023-12-02 RX ORDER — PHENYLEPHRINE HCL IN 0.9% NACL 1 MG/10 ML
SYRINGE (ML) INTRAVENOUS AS NEEDED
Status: DISCONTINUED | OUTPATIENT
Start: 2023-12-02 | End: 2023-12-02 | Stop reason: SURG

## 2023-12-02 RX ORDER — FENTANYL CITRATE 50 UG/ML
INJECTION, SOLUTION INTRAMUSCULAR; INTRAVENOUS
Status: COMPLETED
Start: 2023-12-02 | End: 2023-12-02

## 2023-12-02 RX ORDER — CEFAZOLIN SODIUM 1 G/3ML
INJECTION, POWDER, FOR SOLUTION INTRAMUSCULAR; INTRAVENOUS AS NEEDED
Status: DISCONTINUED | OUTPATIENT
Start: 2023-12-02 | End: 2023-12-02 | Stop reason: SURG

## 2023-12-02 RX ADMIN — INSULIN LISPRO 2 UNITS: 100 INJECTION, SOLUTION INTRAVENOUS; SUBCUTANEOUS at 20:32

## 2023-12-02 RX ADMIN — CEFAZOLIN SODIUM 2 G: 1 INJECTION, POWDER, FOR SOLUTION INTRAMUSCULAR; INTRAVENOUS at 16:38

## 2023-12-02 RX ADMIN — SUGAMMADEX 200 MG: 100 INJECTION, SOLUTION INTRAVENOUS at 17:19

## 2023-12-02 RX ADMIN — OXYCODONE HYDROCHLORIDE AND ACETAMINOPHEN 1 TABLET: 5; 325 TABLET ORAL at 20:25

## 2023-12-02 RX ADMIN — ROCURONIUM BROMIDE 50 MG: 10 SOLUTION INTRAVENOUS at 16:35

## 2023-12-02 RX ADMIN — SODIUM CHLORIDE 75 ML/HR: 9 INJECTION, SOLUTION INTRAVENOUS at 05:05

## 2023-12-02 RX ADMIN — SODIUM CHLORIDE, POTASSIUM CHLORIDE, SODIUM LACTATE AND CALCIUM CHLORIDE: 600; 310; 30; 20 INJECTION, SOLUTION INTRAVENOUS at 16:29

## 2023-12-02 RX ADMIN — FENTANYL CITRATE 50 MCG: 50 INJECTION, SOLUTION INTRAMUSCULAR; INTRAVENOUS at 18:06

## 2023-12-02 RX ADMIN — LIDOCAINE HYDROCHLORIDE 50 MG: 10 INJECTION, SOLUTION EPIDURAL; INFILTRATION; INTRACAUDAL; PERINEURAL at 16:34

## 2023-12-02 RX ADMIN — FENTANYL CITRATE 100 MCG: 50 INJECTION, SOLUTION INTRAMUSCULAR; INTRAVENOUS at 16:34

## 2023-12-02 RX ADMIN — FENTANYL CITRATE 50 MCG: 50 INJECTION, SOLUTION INTRAMUSCULAR; INTRAVENOUS at 17:48

## 2023-12-02 RX ADMIN — DEXAMETHASONE SODIUM PHOSPHATE 4 MG: 4 INJECTION, SOLUTION INTRAMUSCULAR; INTRAVENOUS at 16:43

## 2023-12-02 RX ADMIN — HYDROMORPHONE HYDROCHLORIDE 0.5 MG: 1 INJECTION, SOLUTION INTRAMUSCULAR; INTRAVENOUS; SUBCUTANEOUS at 17:54

## 2023-12-02 RX ADMIN — PANTOPRAZOLE SODIUM 40 MG: 40 INJECTION, POWDER, LYOPHILIZED, FOR SOLUTION INTRAVENOUS at 20:25

## 2023-12-02 RX ADMIN — PANTOPRAZOLE SODIUM 40 MG: 40 INJECTION, POWDER, LYOPHILIZED, FOR SOLUTION INTRAVENOUS at 09:07

## 2023-12-02 RX ADMIN — Medication 10 ML: at 20:25

## 2023-12-02 RX ADMIN — Medication 100 MCG: at 16:51

## 2023-12-02 RX ADMIN — PROPOFOL 40 MG: 10 INJECTION, EMULSION INTRAVENOUS at 17:21

## 2023-12-02 RX ADMIN — SENNOSIDES AND DOCUSATE SODIUM 2 TABLET: 8.6; 5 TABLET ORAL at 09:07

## 2023-12-02 RX ADMIN — SENNOSIDES AND DOCUSATE SODIUM 2 TABLET: 8.6; 5 TABLET ORAL at 20:25

## 2023-12-02 RX ADMIN — SODIUM CHLORIDE 75 ML/HR: 9 INJECTION, SOLUTION INTRAVENOUS at 20:33

## 2023-12-02 RX ADMIN — ESMOLOL HYDROCHLORIDE 20 MG: 10 INJECTION, SOLUTION INTRAVENOUS at 17:07

## 2023-12-02 RX ADMIN — PROPOFOL 180 MG: 10 INJECTION, EMULSION INTRAVENOUS at 16:34

## 2023-12-02 RX ADMIN — GLYCOPYRROLATE 0.2 MG: 0.2 INJECTION INTRAMUSCULAR; INTRAVENOUS at 16:46

## 2023-12-02 RX ADMIN — Medication 10 ML: at 09:08

## 2023-12-02 RX ADMIN — HYDROMORPHONE HYDROCHLORIDE 0.5 MG: 1 INJECTION, SOLUTION INTRAMUSCULAR; INTRAVENOUS; SUBCUTANEOUS at 19:01

## 2023-12-02 NOTE — OP NOTE
Operative Note    Evan Salazar  [id number]   1955     Date of Surgery:  12/2/2023    Pre-Operative Diagnosis: Chronic cholecystitis    Post-Operative Diagnosis: Chronic cholecystitis    Procedure: Laparoscopic cholecystectomy                      Attempted intraoperative cholangiogram    Anesthesia:  General          Surgeon:  France Weaver MD    Circulator: Opal Rodriguez RN; Reva Barry RN  Scrub Person: Soniya Waldron; Bettie Miles  Nursing Assistant: Terence Cesar          Estimated Blood Loss: Very minimal    Findings: Chronic inflammation of the gallbladder with very friable cystic duct    Complications: None      Indication for Procedure: Mr. Salazar is a 71-year-old gentleman who was transferred from [facility] with concern about elevated liver function test with work-up showing chronic cholecystitis with sludge in the gallbladder.  His liver function tests have improved overnight.  He has had an MRCP that showed no common bile dilatation or evidence of choledocholithiasis.  Is taken to the operating today for laparoscopic cholecystectomy with possible Intra-Op cholangiogram.    Procedure: Patient was taken to the operating by anesthesia and placed supine on the table.  Following induction of general endotracheal anesthesia, SCDs were placed..  Received 2 g of Kefzol IV.  The abdomen was then prepped and draped in a sterile fashion.  Timeout was observed.  Marcaine 0.5% with epinephrine was injected in the infraumbilical region and a small stab incision was made.  The fascia was incised under direct vision to enter the abdominal cavity.  The Child introducer was advanced and the abdomen insufflated to 15 mmHg using CO2.  The 10 mm scope was advanced and the abdomen inspected.  Small right inguinal hernia was noted.  The patient was then placed in reverse Trendelenburg position, right side up.  An 11 mm trocar was placed in the epigastric region and two 5 mm  trocars were placed in the right upper quadrant under direct vision.  The gallbladder appeared to be mildly inflamed.  The fundus was grasped and pulled over the liver bed.  The infundibulum was pulled inferiorly and laterally.  With gentle dissection the cystic duct was amplified as well as cystic artery.  The cystic duct was well isolated and clipped at the neck of the gallbladder.  A cut was made in the cystic duct through which a taut cholangiogram catheter was introduced.  The cystic duct fell apart.  As such we avoided any trauma to the cystic duct and proceeded by removing the catheter and clipping the distal cystic duct with multiple clips.  These were done away from the common bile duct.  The cystic artery was also well isolated, clipped and transected.  The gallbladder was then removed off the liver bed with cautery.  it was friable.  It was placed in Endo Catch bag and delivered out of the abdomen through the umbilical port and sent to pathology.  Following adequate hemostasis of the liver bed with cautery, it was well irrigated with normal saline with clear return of fluid noted.  The clips were intact with no evidence of bile leak or bleeding noted.  The trocars were then removed under direct vision with no bleeding encountered.  The abdomen was deflated.  Umbilical fascia was approximated with multiple 0 Vicryl sutures and the skin incisions were approximated with 4-0 Monocryl subcuticular suture.  Steri-Strips and sterile dressing was applied.  The patient tolerated procedure well with no complications.  He was extubated and taken to the recovery room in stable condition.  Sponge count and needle count were correct at the end of the procedure.            France Weaver MD  12/02/23  17:27 EST

## 2023-12-02 NOTE — PROGRESS NOTES
[facility] Medicine Services  PROGRESS NOTE    Patient Name: Evan Salazar  : 1955  MRN: [id number]    Date of Admission: 2023  Primary Care Physician: Sharon Dunn DO    Subjective   Subjective     CC:  Follow-up obstructive jaundice    HPI:  Afebrile.  On room air.  Refusing heparin DVT prophylaxis. Denied any abdominal pain.     Objective   Objective     Vital Signs:   Temp:  [98.1 °F (36.7 °C)-98.8 °F (37.1 °C)] 98.3 °F (36.8 °C)  Heart Rate:  [58-73] 73  Resp:  [17-18] 18  BP: (130-137)/(65-85) 132/65     Physical Exam:  Constitutional: No acute distress, awake, alert  HENT: NCAT, mucous membranes moist, mild scleral icterus  Respiratory: Clear to auscultation bilaterally, respiratory effort normal   Cardiovascular: RRR, no murmurs, rubs, or gallops  Gastrointestinal: Positive bowel sounds, soft, nontender, nondistended  Musculoskeletal: No bilateral ankle edema  Psychiatric: Appropriate affect, cooperative  Neurologic: PERRL, symmetric facies, speech clear    Results Reviewed:  LAB RESULTS:      Lab 23  04223  0728 238   WBC 5.56 6.98  --   --  7.99   HEMOGLOBIN 11.7* 11.9*  --   --  13.0   HEMATOCRIT 34.2* 35.4*  --   --  38.9   PLATELETS 181 144  --   --  181   NEUTROS ABS 3.47  --   --   --  6.58   IMMATURE GRANS (ABS) 0.04  --   --   --  0.02   LYMPHS ABS 1.23  --   --   --  0.66*   MONOS ABS 0.56  --   --   --  0.67   EOS ABS 0.22  --   --   --  0.03   MCV 88.4 89.2  --   --  87.8   LACTATE  --   --   --  1.8 2.1*   PROTIME  --   --  13.7  --  13.0   APTT  --   --  28.3  --   --          Lab 23  0419 23  0152 23  0106 23  0728 23  1818   SODIUM 139 141 137 136 139   POTASSIUM 4.4 4.4 4.2 4.4 4.4   CHLORIDE 107 105 102 102 104   CO2 22.0 26.0 23.4 23.8 23.9   ANION GAP 10.0 10.0 11.6 10.2 11.1   BUN 21 15 16 16 20   CREATININE 1.35* 1.44* 1.32* 1.21 1.24   EGFR 57.5* 53.3* 59.1*  65.6 63.7   GLUCOSE 133* 168* 124* 141* 139*   CALCIUM 8.5* 8.9 8.6 8.9 9.3   MAGNESIUM 2.0  --   --   --   --    PHOSPHORUS 3.7  --   --   --   --    HEMOGLOBIN A1C 6.40*  --   --   --   --          Lab 12/02/23  0419 12/01/23  0152 11/30/23  0106 11/29/23  1739 11/29/23  0728 11/28/23  1818   TOTAL PROTEIN 5.5* 5.8* 5.5*  --  6.1 6.7   ALBUMIN 3.7 3.3* 3.4*  --  3.8 4.4   GLOBULIN 1.8 2.5 2.1  --  2.3 2.3   ALT (SGPT) 152* 186* 227*  --  309* 386*   AST (SGOT) 56* 76* 103*  --  210* 402*   BILIRUBIN 1.5* 3.4* 4.2* 4.2* 4.3* 3.1*   INDIRECT BILIRUBIN  --   --   --  0.6  --   --    BILIRUBIN DIRECT  --   --  3.7* 3.6*  --  1.8*   ALK PHOS 212* 194* 162*  --  154* 144*   LIPASE  --   --   --   --   --  27         Lab 11/29/23  0728 11/29/23  0009 11/28/23  1818   HSTROP T  --  21 9   PROTIME 13.7  --  13.0   INR 1.00  --  0.93                 Brief Urine Lab Results  (Last result in the past 365 days)        Color   Clarity   Blood   Leuk Est   Nitrite   Protein   CREAT   Urine HCG        11/28/23 1818 Dark Yellow   Clear   Negative   Negative   Negative   Trace                   Microbiology Results Abnormal       None            No radiology results from the last 24 hrs    Results for orders placed during the hospital encounter of 09/07/22    Adult Transthoracic Echo Complete W/ Cont if Necessary Per Protocol    Interpretation Summary  · Left ventricular wall thickness is consistent with mild concentric hypertrophy.  · Left ventricular ejection fraction appears to be 66 - 70%. Left ventricular systolic function is normal.  · The cardiac chamber dimensions are normal.  · No significant valvular abnormality is present.  · There is no evidence of pericardial effusion.      Current medications:  Scheduled Meds:heparin (porcine), 5,000 Units, Subcutaneous, Q8H  insulin lispro, 2-7 Units, Subcutaneous, 4x Daily AC & at Bedtime  pantoprazole, 40 mg, Intravenous, Q12H  senna-docusate sodium, 2 tablet, Oral, BID  sodium  chloride, 10 mL, Intravenous, Q12H      Continuous Infusions:sodium chloride, 75 mL/hr, Last Rate: 75 mL/hr (12/02/23 4015)      PRN Meds:.  acetaminophen **OR** acetaminophen **OR** acetaminophen    senna-docusate sodium **AND** polyethylene glycol **AND** bisacodyl **AND** bisacodyl    Calcium Replacement - Follow Nurse / BPA Driven Protocol    dextrose    dextrose    glucagon (human recombinant)    HYDROmorphone **AND** naloxone    Magnesium Standard Dose Replacement - Follow Nurse / BPA Driven Protocol    ondansetron    oxyCODONE-acetaminophen    Phosphorus Replacement - Follow Nurse / BPA Driven Protocol    Potassium Replacement - Follow Nurse / BPA Driven Protocol    sodium chloride    sodium chloride    Assessment & Plan   Assessment & Plan     Active Hospital Problems    Diagnosis  POA    **Jaundice [R17]  Yes      Resolved Hospital Problems   No resolved problems to display.        Brief Hospital Course to date:  Evan Saalzar is a 68 y.o. male with history of HTN, type 2 DM, HLD, BPH, anxiety, who presented to the hospital as a transfer from [facility] for GI evaluation for obstructive jaundice. Patient was admitted to [facility] on 11/28/2023 for intermittent right upper quadrant abdominal pain and hepatocellular/obstructive jaundice.  CT abdomen was concerning for stone in CBD.  MRCP with gallbladder sludge with no evidence of CBD dilation or stones.  Lab work-up was concerning for obstructive jaundice.  General surgery team evaluated the patient at [facility] and recommended ERCP prior to cholecystectomy to exclude choledocholithiasis.  Patient was transferred to this hospital for GI evaluation/Gen Surg.    This patient's problems and plans were partially entered by my partner and updated as appropriate by me 12/02/23.     Obstructive jaundice, improving  Elevated liver enzymes  -Liver enzymes and bilirubin  (mainly direct ) were elevated but trending down  -Hepatitis  panel negative  -Consult general surgery team for cholecystectomy  -Discussed with GI, will hold off on GI consult unless has abnormal intraoperative cholangiogram  -NPO for lap cholecystectomy     Mild JENISE, likely prerenal  -Baseline creatinine 1.0.  Creatinine on admission was 1.44  -UA with trace proteinuria and hyper bilirubinuria  -CT abdomen with no obstructive uropathy  -Hold metformin and lisinopril  -IVF     HTN  -Mainly controlled with diet   -Holding low-dose lisinopril 5 mg given JENISE     Type 2 diabetes  -A1c 6.4%  -SSI    Expected Discharge Location and Transportation: home  Expected Discharge   Expected Discharge Date: 12/3/2023; Expected Discharge Time:      DVT prophylaxis:  Medical DVT prophylaxis orders are present.     AM-PAC 6 Clicks Score (PT): 24 (12/01/23 6665)    CODE STATUS:   Code Status and Medical Interventions:   Ordered at: 12/01/23 1456     Level Of Support Discussed With:    Patient     Code Status (Patient has no pulse and is not breathing):    CPR (Attempt to Resuscitate)     Medical Interventions (Patient has pulse or is breathing):    Full Support       Linda Gore MD  12/02/23

## 2023-12-02 NOTE — ANESTHESIA PROCEDURE NOTES
Airway  Urgency: elective    Date/Time: 12/2/2023 4:37 PM  Airway not difficult    General Information and Staff    Patient location during procedure: OR  Anesthesiologist: Shani Root DO    Indications and Patient Condition  Indications for airway management: airway protection    Preoxygenated: yes  MILS not maintained throughout  Mask difficulty assessment: 2 - vent by mask + OA or adjuvant +/- NMBA    Final Airway Details  Final airway type: endotracheal airway      Successful airway: ETT  Cuffed: yes   Successful intubation technique: video laryngoscopy  Facilitating devices/methods: intubating stylet  Endotracheal tube insertion site: oral  Blade: Gomez  Blade size: 3  ETT size (mm): 7.0  Cormack-Lehane Classification: grade I - full view of glottis  Placement verified by: chest auscultation and capnometry   Measured from: lips  ETT/EBT  to lips (cm): 21  Number of attempts at approach: 1  Assessment: lips, teeth, and gum same as pre-op and atraumatic intubation    Additional Comments  Patient history, labs, physical exam, anesthetic plan reviewed with patient in preop.  Pt transported to room via RN. All ASA monitors applied, preoxygenated x 3 min/ ETO2 of >85, IV patent, smooth induction. easy intubation with elective gomez use with anterior airway noted and gr1 view, + ETCO2 >30,  negative epigastric sounds, breath sound equal bilaterally with symmetric chest rise and fall, tube secured, all VSS, cspine neutrality maintained throughout induction, intubation and postitioning, all ppp, arms <90.

## 2023-12-02 NOTE — CONSULTS
General Surgery Consultation Note    Date of Service: 12/2/2023  Evan Salazar  [id number]  1955      Referring Provider: Linda Gore MD    Location of Consult: 3E     Reason for Consultation: Gallbladder sludge       History of Present Illness:  I am seeing, Evan Salazar, in consultation for Linda Gore MD regarding gallbladder sludge.  Patient is a six 8-year-old pleasant gentleman with history of type 2 diabetes mellitus, hypertension and hyperlipidemia.  He was transferred from [facility] with elevated liver function test and bilirubin of 3.4.  Patient was admitted to that hospital on 11/28/2023 with right upper quadrant pain and jaundice.  He has had multiple episodes of biliary colic over the last 2 months.  Work-up was remarkable for gallbladder sludge with no evidence of choledocholithiasis on MRCP.  However his bilirubin elevation persisted.  He was transferred for evaluation for ERCP.  He has been clinically stable.  He denies abdominal pain.  Laboratory values today show a bilirubin of 1.5 with alkaline phosphatase of 212 and normal white blood count.  No abdominal surgeries.     Problems Addressed this Visit    None  Diagnoses    None.         Past Medical History:   Diagnosis Date    Anxiety     Arthritis     Benign fibroma of prostate 11/28/2023    COVID-19 09/02/2021    Received Casirivimab,Imdevimab in the ED; not hospitalized    Epididymitis     GERD (gastroesophageal reflux disease)     Heartburn     Hydrocele     Kidney stone     Mixed hyperlipidemia 11/28/2023    Primary hypertension 04/04/2023    Type 2 diabetes mellitus     Vitamin D deficiency 11/28/2023       Past Surgical History:    COLONOSCOPY    CYSTOSCOPY W/ URETEROSCOPY W/ LITHOTRIPSY    FINGER SURGERY    HAND SURGERY    HERNIA REPAIR    SCROTAL EXPLORATION    Procedure: SCROTAL EXPLORATION/ INGUINAL EXPLORATION HEMIORCHICETOMY WITH FROZEN SECTION.;  Surgeon: Edvin Mars MD;  Location: [facility] OR;   Service: Urology       Allergies   Allergen Reactions    Bactrim [Sulfamethoxazole-Trimethoprim] Rash and Other (See Comments)     Flu like symptoms       Current Facility-Administered Medications on File Prior to Encounter   Medication Dose Route Frequency Provider Last Rate Last Admin    [DISCONTINUED] bisacodyl (DULCOLAX) EC tablet 5 mg  5 mg Oral Daily PRN Luca Dominguez MD        [DISCONTINUED] bisacodyl (DULCOLAX) suppository 10 mg  10 mg Rectal Daily PRN Luca Dominguez MD        [DISCONTINUED] cholecalciferol (VITAMIN D3) tablet 1,000 Units  1,000 Units Oral Daily Nikolay Calles DO   1,000 Units at 12/01/23 0819    [DISCONTINUED] dextrose (D50W) (25 g/50 mL) IV injection 25 g  25 g Intravenous Q15 Min PRN Nikolay Calles DO        [DISCONTINUED] dextrose (GLUTOSE) oral gel 15 g  15 g Oral Q15 Min PRN Nikolay Calles DO        [DISCONTINUED] glucagon HCl (Diagnostic) injection 1 mg  1 mg Intramuscular Q15 Min PRN Nikolay Calles DO        [DISCONTINUED] heparin (porcine) 5000 UNIT/ML injection 5,000 Units  5,000 Units Subcutaneous Q12H Luca Dominguez MD        [DISCONTINUED] Insulin Lispro (humaLOG) injection 2-7 Units  2-7 Units Subcutaneous 4x Daily AC & at Bedtime Nikolay Calles DO   2 Units at 11/30/23 2019    [DISCONTINUED] linagliptin (TRADJENTA) tablet 5 mg  5 mg Oral Daily Nikolay Calles DO   5 mg at 12/01/23 0819    [DISCONTINUED] lisinopril (PRINIVIL,ZESTRIL) tablet 5 mg  5 mg Oral Daily Nikolay Calles DO   5 mg at 11/29/23 1339    [DISCONTINUED] morphine injection 2 mg  2 mg Intravenous Q3H PRN Nikolay Calles DO   2 mg at 11/29/23 1925    [DISCONTINUED] nitroglycerin (NITROSTAT) SL tablet 0.4 mg  0.4 mg Sublingual Q5 Min PRN Luca Dominguez MD        [DISCONTINUED] ondansetron (ZOFRAN) injection 4 mg  4 mg Intravenous Q6H PRN Nikolay Calles DO   4 mg at 11/29/23 1515    [DISCONTINUED] pantoprazole (PROTONIX) EC tablet 40 mg  40 mg Oral Q AM Nikolay Calles DO   40 mg at 11/29/23  1339    [DISCONTINUED] polyethylene glycol (MIRALAX) packet 17 g  17 g Oral Daily PRN Luca Dominguez MD        [DISCONTINUED] sennosides-docusate (PERICOLACE) 8.6-50 MG per tablet 2 tablet  2 tablet Oral BID Luca Dominguez MD   2 tablet at 12/01/23 0819    [DISCONTINUED] sodium chloride 0.9 % flush 10 mL  10 mL Intravenous PRN Luca Dominguez MD        [DISCONTINUED] sodium chloride 0.9 % flush 10 mL  10 mL Intravenous Q12H Luca Dominguez MD   10 mL at 12/01/23 0819    [DISCONTINUED] sodium chloride 0.9 % flush 10 mL  10 mL Intravenous PRN Luca Dominguez MD        [DISCONTINUED] sodium chloride 0.9 % infusion 40 mL  40 mL Intravenous PRN Luca Dominguez MD         Current Outpatient Medications on File Prior to Encounter   Medication Sig Dispense Refill    cholecalciferol (VITAMIN D3) 25 MCG (1000 UT) tablet Take 1 tablet by mouth Daily.      esomeprazole (nexIUM) 40 MG capsule Take 1 capsule by mouth Every Morning Before Breakfast.      lisinopril (PRINIVIL,ZESTRIL) 5 MG tablet TAKE 1 TABLET BY MOUTH DAILY 30 tablet 3    metFORMIN ER (GLUCOPHAGE-XR) 500 MG 24 hr tablet Take 2 tablets by mouth Daily With Breakfast & Dinner.      omega-3 acid ethyl esters (LOVAZA) 1 g capsule TAKE TWO CAPSULE BY MOUTH TWICE DAILY      SITagliptin (JANUVIA) 100 MG tablet Take 1 tablet by mouth Daily.           Current Facility-Administered Medications:     acetaminophen (TYLENOL) tablet 650 mg, 650 mg, Oral, Q4H PRN **OR** acetaminophen (TYLENOL) 160 MG/5ML oral solution 650 mg, 650 mg, Oral, Q4H PRN **OR** acetaminophen (TYLENOL) suppository 650 mg, 650 mg, Rectal, Q4H PRN, Gildardo Fulton MD    sennosides-docusate (PERICOLACE) 8.6-50 MG per tablet 2 tablet, 2 tablet, Oral, BID, 2 tablet at 12/02/23 0907 **AND** polyethylene glycol (MIRALAX) packet 17 g, 17 g, Oral, Daily PRN **AND** bisacodyl (DULCOLAX) EC tablet 5 mg, 5 mg, Oral, Daily PRN **AND** bisacodyl (DULCOLAX) suppository 10 mg, 10 mg, Rectal,  Daily PRN, Gildardo Fulton MD    Calcium Replacement - Follow Nurse / BPA Driven Protocol, , Does not apply, Donaldo JETER Mohamed Ahmed, MD    dextrose (D50W) (25 g/50 mL) IV injection 25 g, 25 g, Intravenous, Q15 Min PRN, Gildardo Fulton MD    dextrose (GLUTOSE) oral gel 15 g, 15 g, Oral, Q15 Min PRN, Gildardo Fulton MD    glucagon (GLUCAGEN) injection 1 mg, 1 mg, Intramuscular, Q15 Min PRN, Gildardo Fulton MD    heparin (porcine) 5000 UNIT/ML injection 5,000 Units, 5,000 Units, Subcutaneous, Q8H, Gildardo Fulton MD    HYDROmorphone (DILAUDID) injection 0.5 mg, 0.5 mg, Intravenous, Q2H PRN **AND** naloxone (NARCAN) injection 0.4 mg, 0.4 mg, Intravenous, Q5 Min PRN, Gildardo Fulton MD    Insulin Lispro (humaLOG) injection 2-7 Units, 2-7 Units, Subcutaneous, 4x Daily AC & at Bedtime, Gildardo Fulton MD, 3 Units at 12/01/23 2126    Magnesium Standard Dose Replacement - Follow Nurse / BPA Driven Protocol, , Does not apply, Donaldo JETER Mohamed Ahmed, MD    ondansetron (ZOFRAN) injection 4 mg, 4 mg, Intravenous, Q6H PRN, Gildardo Fulton MD    oxyCODONE-acetaminophen (PERCOCET) 5-325 MG per tablet 1 tablet, 1 tablet, Oral, Q4H PRN, Gildardo Fulton MD    pantoprazole (PROTONIX) injection 40 mg, 40 mg, Intravenous, Q12H, Gildardo Fulton MD, 40 mg at 12/02/23 0907    Phosphorus Replacement - Follow Nurse / BPA Driven Protocol, , Does not apply, Donaldo JETER Mohamed Ahmed, MD    Potassium Replacement - Follow Nurse / BPA Driven Protocol, , Does not apply, Donaldo JETER Mohamed Ahmed, MD    sodium chloride 0.9 % flush 10 mL, 10 mL, Intravenous, Q12H, Gildardo Fulton MD, 10 mL at 12/02/23 0908    sodium chloride 0.9 % flush 10 mL, 10 mL, Intravenous, PRN, Gildardo Fulton MD    sodium chloride 0.9 % infusion 40 mL, 40 mL, Intravenous, PRN, Gildardo Fulton MD    sodium chloride 0.9 % infusion, 75 mL/hr, Intravenous, Continuous, Donaldo,  "Gildardo Pillai MD, Last Rate: 75 mL/hr at 12/02/23 0505, 75 mL/hr at 12/02/23 0505    Family History   Problem Relation Age of Onset    No Known Problems Father     Diabetes Mother      Social History     Socioeconomic History    Marital status:    Tobacco Use    Smoking status: Never     Passive exposure: Never    Smokeless tobacco: Never   Vaping Use    Vaping Use: Never used   Substance and Sexual Activity    Alcohol use: No    Drug use: No    Sexual activity: Yes     Partners: Female       Review of Systems:  Review of Systems  As above  Otherwise the 12 point review of systems is negative.    /69 (BP Location: Left arm, Patient Position: Lying)   Pulse 80   Temp 97.5 °F (36.4 °C) (Oral)   Resp 18   Ht 175.3 cm (69\")   Wt 74.5 kg (164 lb 3.9 oz)   SpO2 94%   BMI 24.25 kg/m²   Body mass index is 24.25 kg/m².    General: Alert and oriented x3 in no acute distress  HEENT: PER, positive icterus, normal sclerae  Cardiac: regular rhythm,  no audible rubs  Pulmonary: bilateral breath sounds, nonlabored  Abdominal: Nondistended and soft with no tenderness or guarding  Neurologic: awake, alert, no obvious focal deficits  Extremities: warm, no edema  Skin: no obvious rashes nor worrisome lesions seen     CBC  Results from last 7 days   Lab Units 12/02/23  0421   WBC 10*3/mm3 5.56   HEMOGLOBIN g/dL 11.7*   HEMATOCRIT % 34.2*   PLATELETS 10*3/mm3 181       CMP  Results from last 7 days   Lab Units 12/02/23  0419   SODIUM mmol/L 139   POTASSIUM mmol/L 4.4   CHLORIDE mmol/L 107   CO2 mmol/L 22.0   BUN mg/dL 21   CREATININE mg/dL 1.35*   CALCIUM mg/dL 8.5*   BILIRUBIN mg/dL 1.5*   ALK PHOS U/L 212*   ALT (SGPT) U/L 152*   AST (SGOT) U/L 56*   GLUCOSE mg/dL 133*       Radiology  Imaging Results (Last 72 Hours)       ** No results found for the last 72 hours. **              Assessment:  Mr. Salazar is a pleasant 68-year-old gentleman with history of hypertension, type 2 diabetes mellitus and hyperlipidemia " who was transferred from [facility] with concern about gallbladder sludge and persistent liver function test elevation concerning for possible common bile duct stone/sludge.  He has been clinically stable.  Liver function tests have shown improvement today.    Plan:  I reviewed his studies and discussed the findings with the patient and Dr. Gore.  I recommend proceeding with laparoscopic cholecystectomy with intraoperative cholangiogram.  I discussed the procedure at length with the patient.  The risks of anesthesia, infection, bleeding, possible common bile duct, liver as well as bowel injury and open cholecystectomy were discussed.  He understands and is willing to proceed with the surgery.    Thank you for this consultation.      France Weaver MD  12/02/23  10:17 EST

## 2023-12-02 NOTE — ANESTHESIA PREPROCEDURE EVALUATION
Anesthesia Evaluation     Patient summary reviewed and Nursing notes reviewed   no history of anesthetic complications:   NPO Solid Status: > 8 hours  NPO Liquid Status: > 2 hours           Airway   Mallampati: II  TM distance: >3 FB  Neck ROM: full  No difficulty expected  Dental - normal exam     Pulmonary - normal exam   (+) a smoker,  (-) COPD, sleep apnea  Cardiovascular - normal exam  Exercise tolerance: good (4-7 METS)    ECG reviewed    (+) hypertension, hyperlipidemia  (-) dysrhythmias, angina, CHF, cardiac stents    ROS comment: 9/22-· Left ventricular wall thickness is consistent with mild concentric hypertrophy.  · Left ventricular ejection fraction appears to be 66 - 70%. Left ventricular systolic function is normal.  · The cardiac chamber dimensions are normal.  · No significant valvular abnormality is present.  · There is no evidence of pericardial effusion.      Neuro/Psych  (+) psychiatric history  (-) seizures, CVA  GI/Hepatic/Renal/Endo    (+) GERD well controlled, renal disease- stones, diabetes mellitus type 2    Musculoskeletal     Abdominal    Substance History      OB/GYN          Other   arthritis,     ROS/Med Hx Other: Hgb 11.7 k 4.4 cr 1.35  No gerd/n/v/glp1      Phys Exam Other: Slight retrognathia              Anesthesia Plan    ASA 3     general     (Risks and benefits of general anesthesia discussed with patient (including MI, CVA, death, recall, aspiration, oropharyngeal/dental damage), questions answered, agreeable to proceed.    )  intravenous induction     Anesthetic plan, risks, benefits, and alternatives have been provided, discussed and informed consent has been obtained with: patient.    Plan discussed with CRNA.    CODE STATUS:    Level Of Support Discussed With: Patient  Code Status (Patient has no pulse and is not breathing): CPR (Attempt to Resuscitate)  Medical Interventions (Patient has pulse or is breathing): Full Support

## 2023-12-02 NOTE — ANESTHESIA POSTPROCEDURE EVALUATION
Patient: Evan Salazar    Procedure Summary       Date: 12/02/23 Room / Location:  [facility] /  [facility]    Anesthesia Start: 1628 Anesthesia Stop: 1731    Procedure: CHOLECYSTECTOMY LAPAROSCOPIC, ATTEMPTED INTRAOPERATIVE CHOLANGIOGRAM (Abdomen) Diagnosis: Chronic cholecystitis    Surgeons: France Weaver MD Provider: Shani Root DO    Anesthesia Type: general ASA Status: 3            Anesthesia Type: general    Vitals  Vitals Value Taken Time   BP     Temp     Pulse 82 12/02/23 1730   Resp     SpO2 100 % 12/02/23 1730   Vitals shown include unfiled device data.        Post Anesthesia Care and Evaluation    Patient location during evaluation: PACU  Patient participation: complete - patient participated  Level of consciousness: awake and alert  Pain management: adequate    Airway patency: patent  Anesthetic complications: No anesthetic complications  PONV Status: none  Cardiovascular status: hemodynamically stable and acceptable  Respiratory status: nonlabored ventilation, acceptable and nasal cannula  Hydration status: acceptable    Comments: To PACU on O2NC, breathing comfortably.  Report to PACU RN at bedside. VSS.

## 2023-12-02 NOTE — BRIEF OP NOTE
CHOLECYSTECTOMY LAPAROSCOPIC  Progress Note    Evan Salazar  12/2/2023    Pre-op Diagnosis:   Chronic cholecystitis       Post-Op Diagnosis Codes:     * Chronic cholecystitis [K81.1]    Procedure/CPT® Codes:        Procedure(s):  CHOLECYSTECTOMY LAPAROSCOPIC   ATTEMPTED INTRAOPERATIVE CHOLANGIOGRAM              Surgeon(s):  France Weaver MD    Anesthesia: General    Staff:   Circulator: Opal Rodriguez RN; Reva Barry RN  Scrub Person: Soniya Waldron; Bettie Miles  Nursing Assistant: Terence Cesar         Estimated Blood Loss: minimal    Urine Voided: * No values recorded between 12/2/2023  4:28 PM and 12/2/2023  5:12 PM *    Specimens:                Specimens       ID Source Type Tests Collected By Collected At Frozen?    A Gallbladder Tissue TISSUE PATHOLOGY EXAM   Farnce Weaver MD 12/2/23 1708                   Drains: * No LDAs found *    Findings: Chronic inflammation of the gallbladder                    Very friable cystic duct        Complications: None          France Weaver MD     Date: 12/2/2023  Time: 17:24 EST

## 2023-12-03 ENCOUNTER — READMISSION MANAGEMENT (OUTPATIENT)
Dept: CALL CENTER | Facility: HOSPITAL | Age: 68
End: 2023-12-03
Payer: MEDICARE

## 2023-12-03 VITALS
OXYGEN SATURATION: 95 % | SYSTOLIC BLOOD PRESSURE: 145 MMHG | RESPIRATION RATE: 18 BRPM | TEMPERATURE: 98.7 F | BODY MASS INDEX: 24.33 KG/M2 | WEIGHT: 164.24 LBS | HEART RATE: 57 BPM | HEIGHT: 69 IN | DIASTOLIC BLOOD PRESSURE: 70 MMHG

## 2023-12-03 PROBLEM — R74.01 TRANSAMINITIS: Status: ACTIVE | Noted: 2023-12-03

## 2023-12-03 PROBLEM — K81.1 CHRONIC CHOLECYSTITIS: Status: ACTIVE | Noted: 2023-12-01

## 2023-12-03 LAB
ALBUMIN SERPL-MCNC: 3.6 G/DL (ref 3.5–5.2)
ALBUMIN/GLOB SERPL: 1.8 G/DL
ALP SERPL-CCNC: 186 U/L (ref 39–117)
ALT SERPL W P-5'-P-CCNC: 139 U/L (ref 1–41)
ANION GAP SERPL CALCULATED.3IONS-SCNC: 13 MMOL/L (ref 5–15)
AST SERPL-CCNC: 68 U/L (ref 1–40)
BILIRUB SERPL-MCNC: 1.3 MG/DL (ref 0–1.2)
BUN SERPL-MCNC: 16 MG/DL (ref 8–23)
BUN/CREAT SERPL: 13.9 (ref 7–25)
CALCIUM SPEC-SCNC: 8.4 MG/DL (ref 8.6–10.5)
CHLORIDE SERPL-SCNC: 104 MMOL/L (ref 98–107)
CO2 SERPL-SCNC: 20 MMOL/L (ref 22–29)
CREAT SERPL-MCNC: 1.15 MG/DL (ref 0.76–1.27)
DEPRECATED RDW RBC AUTO: 41.8 FL (ref 37–54)
EGFRCR SERPLBLD CKD-EPI 2021: 69.8 ML/MIN/1.73
ERYTHROCYTE [DISTWIDTH] IN BLOOD BY AUTOMATED COUNT: 12.8 % (ref 12.3–15.4)
GLOBULIN UR ELPH-MCNC: 2 GM/DL
GLUCOSE BLDC GLUCOMTR-MCNC: 130 MG/DL (ref 70–130)
GLUCOSE BLDC GLUCOMTR-MCNC: 175 MG/DL (ref 70–130)
GLUCOSE SERPL-MCNC: 125 MG/DL (ref 65–99)
HCT VFR BLD AUTO: 34.5 % (ref 37.5–51)
HGB BLD-MCNC: 11.3 G/DL (ref 13–17.7)
MCH RBC QN AUTO: 29.4 PG (ref 26.6–33)
MCHC RBC AUTO-ENTMCNC: 32.8 G/DL (ref 31.5–35.7)
MCV RBC AUTO: 89.6 FL (ref 79–97)
PLATELET # BLD AUTO: 183 10*3/MM3 (ref 140–450)
PMV BLD AUTO: 9.9 FL (ref 6–12)
POTASSIUM SERPL-SCNC: 4.6 MMOL/L (ref 3.5–5.2)
PROT SERPL-MCNC: 5.6 G/DL (ref 6–8.5)
RBC # BLD AUTO: 3.85 10*6/MM3 (ref 4.14–5.8)
SODIUM SERPL-SCNC: 137 MMOL/L (ref 136–145)
WBC NRBC COR # BLD AUTO: 8.55 10*3/MM3 (ref 3.4–10.8)

## 2023-12-03 PROCEDURE — 80053 COMPREHEN METABOLIC PANEL: CPT | Performed by: SURGERY

## 2023-12-03 PROCEDURE — 99239 HOSP IP/OBS DSCHRG MGMT >30: CPT | Performed by: STUDENT IN AN ORGANIZED HEALTH CARE EDUCATION/TRAINING PROGRAM

## 2023-12-03 PROCEDURE — 25810000003 SODIUM CHLORIDE 0.9 % SOLUTION: Performed by: SURGERY

## 2023-12-03 PROCEDURE — 82948 REAGENT STRIP/BLOOD GLUCOSE: CPT

## 2023-12-03 PROCEDURE — 25010000002 HYDROMORPHONE PER 4 MG: Performed by: SURGERY

## 2023-12-03 PROCEDURE — 85027 COMPLETE CBC AUTOMATED: CPT | Performed by: SURGERY

## 2023-12-03 RX ORDER — HYDROCODONE BITARTRATE AND ACETAMINOPHEN 5; 325 MG/1; MG/1
1 TABLET ORAL EVERY 8 HOURS PRN
Qty: 7 TABLET | Refills: 0 | Status: SHIPPED | OUTPATIENT
Start: 2023-12-03 | End: 2023-12-06

## 2023-12-03 RX ORDER — OXYCODONE HYDROCHLORIDE AND ACETAMINOPHEN 5; 325 MG/1; MG/1
1 TABLET ORAL EVERY 6 HOURS PRN
Qty: 8 TABLET | Refills: 0 | Status: SHIPPED | OUTPATIENT
Start: 2023-12-03 | End: 2023-12-03 | Stop reason: HOSPADM

## 2023-12-03 RX ADMIN — SODIUM CHLORIDE 75 ML/HR: 9 INJECTION, SOLUTION INTRAVENOUS at 09:03

## 2023-12-03 RX ADMIN — Medication 10 ML: at 08:57

## 2023-12-03 RX ADMIN — PANTOPRAZOLE SODIUM 40 MG: 40 INJECTION, POWDER, LYOPHILIZED, FOR SOLUTION INTRAVENOUS at 08:57

## 2023-12-03 RX ADMIN — HYDROMORPHONE HYDROCHLORIDE 0.5 MG: 1 INJECTION, SOLUTION INTRAMUSCULAR; INTRAVENOUS; SUBCUTANEOUS at 06:53

## 2023-12-03 RX ADMIN — SENNOSIDES AND DOCUSATE SODIUM 2 TABLET: 8.6; 5 TABLET ORAL at 08:57

## 2023-12-03 RX ADMIN — HYDROMORPHONE HYDROCHLORIDE 0.5 MG: 1 INJECTION, SOLUTION INTRAMUSCULAR; INTRAVENOUS; SUBCUTANEOUS at 02:48

## 2023-12-03 RX ADMIN — OXYCODONE HYDROCHLORIDE AND ACETAMINOPHEN 1 TABLET: 5; 325 TABLET ORAL at 08:57

## 2023-12-03 RX ADMIN — OXYCODONE HYDROCHLORIDE AND ACETAMINOPHEN 1 TABLET: 5; 325 TABLET ORAL at 01:07

## 2023-12-03 NOTE — CASE MANAGEMENT/SOCIAL WORK
Case Management Discharge Note      Final Note: Home today with wife and on RA. No d/c needs noted at this time.         Selected Continued Care - Admitted Since 12/1/2023       Destination    No services have been selected for the patient.                Durable Medical Equipment    No services have been selected for the patient.                Dialysis/Infusion    No services have been selected for the patient.                Home Medical Care    No services have been selected for the patient.                Therapy    No services have been selected for the patient.                Community Resources    No services have been selected for the patient.                Community & DME    No services have been selected for the patient.                         Final Discharge Disposition Code: 01 - home or self-care

## 2023-12-03 NOTE — PROGRESS NOTES
"Evan Salazar  1955  [id number]    Surgery Progress Note    Date of visit: 12/3/2023    Subjective: Complaining of puncture site pain  Tolerated diet  Ambulating in the room    Objective:    /70 (BP Location: Right arm, Patient Position: Lying)   Pulse 57   Temp 98.7 °F (37.1 °C) (Oral)   Resp 18   Ht 175.3 cm (69\")   Wt 74.5 kg (164 lb 3.9 oz)   SpO2 95%   BMI 24.25 kg/m²     Intake/Output Summary (Last 24 hours) at 12/3/2023 1023  Last data filed at 12/2/2023 1722  Gross per 24 hour   Intake 800 ml   Output --   Net 800 ml       CV: Regular rate and rhythm  L: normal air entry  Abd: Soft  Minimal puncture site tenderness      LABS:    Results from last 7 days   Lab Units 12/03/23  0641   WBC 10*3/mm3 8.55   HEMOGLOBIN g/dL 11.3*   HEMATOCRIT % 34.5*   PLATELETS 10*3/mm3 183     Results from last 7 days   Lab Units 12/03/23  0641   SODIUM mmol/L 137   POTASSIUM mmol/L 4.6   CHLORIDE mmol/L 104   CO2 mmol/L 20.0*   BUN mg/dL 16   CREATININE mg/dL 1.15   CALCIUM mg/dL 8.4*   BILIRUBIN mg/dL 1.3*   ALK PHOS U/L 186*   ALT (SGPT) U/L 139*   AST (SGOT) U/L 68*   GLUCOSE mg/dL 125*     Results from last 7 days   Lab Units 12/03/23  0641   SODIUM mmol/L 137   POTASSIUM mmol/L 4.6   CHLORIDE mmol/L 104   CO2 mmol/L 20.0*   BUN mg/dL 16   CREATININE mg/dL 1.15   GLUCOSE mg/dL 125*   CALCIUM mg/dL 8.4*     Lab Results   Lab Value Date/Time    LIPASE 27 11/28/2023 1818    LIPASE 45 06/25/2014 1530         Assessment/ Plan: Stable course status post laparoscopic cholecystectomy with attempted IOC  Patient is progressing well  Liver function tests are coming down nicely  Stable for discharge home from surgical standpoint  Norco as needed  Avoid any heavy lifting for 1 month  Follow-up in the office in 1 week    Problem List Items Addressed This Visit          Gastrointestinal Abdominal     * (Principal) Chronic cholecystitis - Primary    Relevant Medications    oxyCODONE-acetaminophen (PERCOCET) 5-325 MG per " tablet     Other Visit Diagnoses       Cholecystitis        Relevant Orders    Tissue Pathology Exam              France Weaver MD  12/3/2023  10:23 EST

## 2023-12-03 NOTE — OUTREACH NOTE
Prep Survey      Flowsheet Row Responses   [facility] facility patient discharged from? [place]   Is LACE score < 7 ? No   Eligibility Readm Mgmt   Discharge diagnosis lap honey   Does the patient have one of the following disease processes/diagnoses(primary or secondary)? General Surgery   Does the patient have Home health ordered? No   Is there a DME ordered? No   Prep survey completed? Yes            Fanta PATRICK - Registered Nurse

## 2023-12-03 NOTE — DISCHARGE SUMMARY
[facility] Medicine Services  DISCHARGE SUMMARY    Patient Name: Evan Salazar  : 1955  MRN: [id number]    Date of Admission: 2023  2:14 PM  Date of Discharge: 12/3/2023  Primary Care Physician: Sharon Dunn DO    Consults       Date and Time Order Name Status Description    2023  8:52 AM Inpatient General Surgery Consult Completed             Hospital Course     Presenting Problem: chronic cholecystitis     Active Hospital Problems    Diagnosis  POA    **Chronic cholecystitis [K81.1]  Yes    Transaminitis [R74.01]  Yes      Resolved Hospital Problems   No resolved problems to display.          Hospital Course:  Evan Salazar is a 68 y.o. male with history of HTN, type 2 DM, HLD, BPH, anxiety, who presented to the hospital as a transfer from [facility] for GI evaluation for obstructive jaundice. Patient was admitted to [facility] on 2023 for intermittent right upper quadrant abdominal pain and hepatocellular/obstructive jaundice.  CT abdomen was concerning for stone in CBD.  MRCP with gallbladder sludge with no evidence of CBD dilation or stones.  Lab work-up was concerning for obstructive jaundice.  General surgery team evaluated the patient at [facility] and recommended ERCP prior to cholecystectomy to exclude choledocholithiasis.  Patient was transferred to this hospital for further evaluation.     This patient's problems and plans were partially entered by my partner and updated as appropriate by me 23.     Obstructive jaundice, improving  Elevated liver enzymes  -Liver enzymes and bilirubin  (mainly direct ) were elevated but trending down  -Hepatitis panel negative  -Status post laparoscopic cholecystectomy on  with Dr. Weaver  -Discussed with Dr. LARA on 12/3 - he stated he would arrange outpatient follow-up & no need for GI evaluation at this time  -Discussed with GI, will hold off on GI consult unless has  abnormal intraoperative cholangiogram     Mild JENISE, likely prerenal  -Baseline creatinine 1.0.  Creatinine on admission was 1.44, now improved to 1.15 at time of discharge  -UA with trace proteinuria and hyper bilirubinuria  -CT abdomen with no obstructive uropathy  -Hold metformin and lisinopril  -s/p IVF     HTN  -Mainly controlled with diet, resume lisinopril on DC     Type 2 diabetes  -A1c 6.4%  -SSI     Patient's granddaughter at bedside and updated on plan of care with patient's permission    Discharge Follow Up Recommendations for outpatient labs/diagnostics:   1.  Follow-up with primary care doctor in 3 to 4 days regarding this hospitalization, repeat labs (CBC, CMP)  2.  Follow-up with Dr. Weaver in 1 week. If his office doesn't reach out to you early this week, please call on 12/5/2023 to schedule follow-up appointment    Day of Discharge     HPI:   Reports mild abdominal soreness around incision sites.  No nausea or vomiting.  No diarrhea.  No respiratory symptoms.  No chest pain.  Satting 95% on room air at time of evaluation. The patient's granddaughter is at bedside.    Review of Systems  Gen- No fevers, chills  CV- No chest pain, palpitations  Resp- No cough, dyspnea  GI- No N/V/D, abd pain    Vital Signs:   Temp:  [97.3 °F (36.3 °C)-98.9 °F (37.2 °C)] 98.7 °F (37.1 °C)  Heart Rate:  [54-79] 57  Resp:  [16-22] 18  BP: (133-182)/(64-90) 145/70  Flow (L/min):  [2-2.5] 2.5      Physical Exam:  Constitutional: No acute distress, awake, alert  HENT: NCAT, mucous membranes moist, mild scleral icterus  Respiratory: Clear to auscultation bilaterally, respiratory effort normal   Cardiovascular: RRR, no murmurs, rubs, or gallops  Gastrointestinal: Positive bowel sounds, soft, nontender, nondistended, surgical incisions with bandages in place and c/d/i  Musculoskeletal: No bilateral ankle edema  Psychiatric: Appropriate affect, cooperative  Neurologic: PERRL, symmetric facies, symmetric palate rise, tongue  midline    Pertinent  and/or Most Recent Results     LAB RESULTS:      Lab 12/03/23  0641 12/02/23  0421 11/30/23 0106 11/29/23 0728 11/28/23 2046 11/28/23  1818   WBC 8.55 5.56 6.98  --   --  7.99   HEMOGLOBIN 11.3* 11.7* 11.9*  --   --  13.0   HEMATOCRIT 34.5* 34.2* 35.4*  --   --  38.9   PLATELETS 183 181 144  --   --  181   NEUTROS ABS  --  3.47  --   --   --  6.58   IMMATURE GRANS (ABS)  --  0.04  --   --   --  0.02   LYMPHS ABS  --  1.23  --   --   --  0.66*   MONOS ABS  --  0.56  --   --   --  0.67   EOS ABS  --  0.22  --   --   --  0.03   MCV 89.6 88.4 89.2  --   --  87.8   LACTATE  --   --   --   --  1.8 2.1*   PROTIME  --   --   --  13.7  --  13.0   APTT  --   --   --  28.3  --   --          Lab 12/03/23  0641 12/02/23 0419 12/01/23 0152 11/30/23 0106 11/29/23 0728   SODIUM 137 139 141 137 136   POTASSIUM 4.6 4.4 4.4 4.2 4.4   CHLORIDE 104 107 105 102 102   CO2 20.0* 22.0 26.0 23.4 23.8   ANION GAP 13.0 10.0 10.0 11.6 10.2   BUN 16 21 15 16 16   CREATININE 1.15 1.35* 1.44* 1.32* 1.21   EGFR 69.8 57.5* 53.3* 59.1* 65.6   GLUCOSE 125* 133* 168* 124* 141*   CALCIUM 8.4* 8.5* 8.9 8.6 8.9   MAGNESIUM  --  2.0  --   --   --    PHOSPHORUS  --  3.7  --   --   --    HEMOGLOBIN A1C  --  6.40*  --   --   --          Lab 12/03/23  0641 12/02/23 0419 12/01/23 0152 11/30/23 0106 11/29/23  1739 11/29/23  0728 11/28/23  1818   TOTAL PROTEIN 5.6* 5.5* 5.8* 5.5*  --  6.1 6.7   ALBUMIN 3.6 3.7 3.3* 3.4*  --  3.8 4.4   GLOBULIN 2.0 1.8 2.5 2.1  --  2.3 2.3   ALT (SGPT) 139* 152* 186* 227*  --  309* 386*   AST (SGOT) 68* 56* 76* 103*  --  210* 402*   BILIRUBIN 1.3* 1.5* 3.4* 4.2* 4.2* 4.3* 3.1*   INDIRECT BILIRUBIN  --   --   --   --  0.6  --   --    BILIRUBIN DIRECT  --   --   --  3.7* 3.6*  --  1.8*   ALK PHOS 186* 212* 194* 162*  --  154* 144*   LIPASE  --   --   --   --   --   --  27         Lab 11/29/23  0728 11/29/23  0009 11/28/23  1818   HSTROP T  --  21 9   PROTIME 13.7  --  13.0   INR 1.00  --  0.93                  Brief Urine Lab Results  (Last result in the past 365 days)        Color   Clarity   Blood   Leuk Est   Nitrite   Protein   CREAT   Urine HCG        11/28/23 1818 Dark Yellow   Clear   Negative   Negative   Negative   Trace                 Microbiology Results (last 10 days)       ** No results found for the last 240 hours. **            MRI abdomen wo contrast mrcp    Result Date: 11/29/2023  EXAM:   MR Abdomen Without Intravenous Contrast, MRCP Protocol  EXAM DATE:   11/29/2023 12:19 PM  CLINICAL HISTORY:   hyperbilirubinemia, liver injury; R10.11-Right upper quadrant pain  TECHNIQUE:   Multiplanar magnetic resonance images of the abdomen without intravenous contrast using MRCP protocol.  COMPARISON:   No relevant prior studies available.  FINDINGS:   BILE DUCTS:  Unremarkable as visualized.  No stones.  No ductal dilation.   GALLBLADDER:  Possibly layering sludge within the gallbladder.  No stones.   LIVER:  Unremarkable as visualized.   PANCREAS:  Unremarkable as visualized.  No ductal dilation.   SPLEEN:  Unremarkable as visualized.  No splenomegaly.   ADRENALS:  Unremarkable as visualized.  No mass.   KIDNEYS AND URETERS:  Unremarkable as visualized.  No hydronephrosis.   STOMACH AND BOWEL:  Unremarkable as visualized.  No obstruction.        Possibly layering sludge within the gallbladder.   This report was finalized on 11/29/2023 12:27 PM by Dr. Lexx Turk MD.      US Gallbladder    Result Date: 11/28/2023  Comparison: None.  Increased echotexture liver compatible with hepatic steatosis. No cholelithiasis or gallbladder wall thickening seen. No pericholecystic fluid is identified. Portal vein demonstrates normal hepatopedal flow. Technologist reports common bile duct at 6-7 mm, not demonstrated on images submitted. Technologist reports negative Malone's sign.      Impression: 1. No acute process.   This report was finalized on 11/28/2023 10:24 PM by Daniel Mcelroy DO.      CT Abdomen Pelvis  With Contrast    Result Date: 11/28/2023  Comparison: None  5 mm groundglass nodule right middle lung, image 9. Mild elevation right hemidiaphragm. No pleural effusion seen. Low-attenuation liver compatible with hepatic steatosis. Gallbladder, pancreas and adrenal glands are unremarkable. 1.3 cm low-attenuation lesion at the anterior spleen, image 25, too small to characterize. Compare with prior imaging for stability or consider follow-up ultrasound to demonstrate solid versus cystic lesion. Perinephric stranding bilaterally appears chronic. No obstructive uropathy seen. No bowel obstruction, free air or significant free fluid. The appendix is unremarkable. Bilateral pars defect L5 with grade 1 spondylolisthesis.      Impression: 1. No acute process with chronic findings above.   This report was finalized on 11/28/2023 8:24 PM by Daniel Mcelroy DO.               Results for orders placed during the hospital encounter of 09/07/22    Adult Transthoracic Echo Complete W/ Cont if Necessary Per Protocol    Interpretation Summary  · Left ventricular wall thickness is consistent with mild concentric hypertrophy.  · Left ventricular ejection fraction appears to be 66 - 70%. Left ventricular systolic function is normal.  · The cardiac chamber dimensions are normal.  · No significant valvular abnormality is present.  · There is no evidence of pericardial effusion.      Plan for Follow-up of Pending Labs/Results: General surgery  Pending Labs       Order Current Status    Tissue Pathology Exam Collected (12/02/23 3925)          Discharge Details        Discharge Medications        New Medications        Instructions Start Date   HYDROcodone-acetaminophen 5-325 MG per tablet  Commonly known as: Norco   1 tablet, Oral, Every 8 Hours PRN      PHARMACY MEDS TO BED CONSULT   Does not apply, Daily             Continue These Medications        Instructions Start Date   cholecalciferol 25 MCG (1000 UT) tablet  Commonly known as:  VITAMIN D3   1,000 Units, Oral, Daily      esomeprazole 40 MG capsule  Commonly known as: nexIUM   40 mg, Oral, Every Morning Before Breakfast      lisinopril 5 MG tablet  Commonly known as: PRINIVIL,ZESTRIL   5 mg, Oral, Daily      metFORMIN  MG 24 hr tablet  Commonly known as: GLUCOPHAGE-XR   1,000 mg, Oral, Daily With Breakfast & Dinner      omega-3 acid ethyl esters 1 g capsule  Commonly known as: LOVAZA   TAKE TWO CAPSULE BY MOUTH TWICE DAILY      SITagliptin 100 MG tablet  Commonly known as: JANUVIA   100 mg, Oral, Daily               Allergies   Allergen Reactions    Bactrim [Sulfamethoxazole-Trimethoprim] Rash and Other (See Comments)     Flu like symptoms         Discharge Disposition:  Home or Self Care    Diet:  Hospital:  Diet Order   Procedures    Diet: Diabetic Diets, Regular/House Diet; Consistent Carbohydrate; Fluid Consistency: Thin (IDDSI 0)       Diet Instructions       Diet: Cardiac Diets, Diabetic Diets, Gastrointestinal Diets; Healthy Heart (2-3 Na+); Regular Texture (IDDSI 7); Thin (IDDSI 0); Consistent Carbohydrate; Fat-Restricted      Discharge Diet:  Cardiac Diets  Diabetic Diets  Gastrointestinal Diets       Cardiac Diet: Healthy Heart (2-3 Na+)    Texture: Regular Texture (IDDSI 7)    Fluid Consistency: Thin (IDDSI 0)    Diabetic Diet: Consistent Carbohydrate    Gastrointestinal Diet: Fat-Restricted             Activity:  Activity Instructions       Bathing Restrictions      Type of Restriction: Bathing    Bathing Restrictions: No Tub Bath    Driving Restrictions      Type of Restriction: Driving    Driving Restrictions: No Driving While Taking Narcotics    Lifting Restrictions      Type of Restriction: Lifting    Lifting Restrictions: Lifting Restriction (Indicate Limit)    Weight Limit (Pounds): 10    Length of Lifting Restriction: for 1 month            Restrictions or Other Recommendations:  No lifting greater than 10 pounds, no driving while taking opioid pain medicine, okay to  shower, no tub baths for now - all discussed with patient prior to discharge       CODE STATUS:    Code Status and Medical Interventions:   Ordered at: 12/01/23 1453     Level Of Support Discussed With:    Patient     Code Status (Patient has no pulse and is not breathing):    CPR (Attempt to Resuscitate)     Medical Interventions (Patient has pulse or is breathing):    Full Support       Future Appointments   Date Time Provider Department Center   4/3/2024  1:00 PM Poncho Koehler MD [facility]       Additional Instructions for the Follow-ups that You Need to Schedule       Call MD With Problems / Concerns   As directed      Please seek medical attention for any following: Difficulty breathing, chest pain, fevers, drainage, redness, and/or swelling around incision site, persistent diarrhea, and/or any other concerning symptoms    Order Comments: Please seek medical attention for any following: Difficulty breathing, chest pain, fevers, drainage, redness, and/or swelling around incision site, persistent diarrhea, and/or any other concerning symptoms         Discharge Follow-up with PCP   As directed       Currently Documented PCP:    Sharon Dunn DO    PCP Phone Number:    [phone]     Follow Up Details: Follow-up with primary care doctor in 3 to 4 days regarding this hospitalization, repeat labs (CBC, CMP)        Discharge Follow-up with Specified Provider: Follow-up with Dr. Weaver within 1 week   As directed      To: Follow-up with Dr. Weaver within 1 week                      Linda Gore MD  12/03/23      Time Spent on Discharge:  I spent  37  minutes on this discharge activity which included: face-to-face encounter with the patient, reviewing the data in the system, coordination of the care with the nursing staff as well as consultants, documentation, and entering orders.

## 2023-12-05 LAB
CYTO UR: NORMAL
LAB AP CASE REPORT: NORMAL
LAB AP CLINICAL INFORMATION: NORMAL
PATH REPORT.FINAL DX SPEC: NORMAL
PATH REPORT.GROSS SPEC: NORMAL

## 2023-12-06 ENCOUNTER — READMISSION MANAGEMENT (OUTPATIENT)
Dept: CALL CENTER | Facility: HOSPITAL | Age: 68
End: 2023-12-06
Payer: MEDICARE

## 2023-12-06 NOTE — OUTREACH NOTE
General Surgery Week 1 Survey      Flowsheet Row Responses   [facility] facility patient discharged from? [place]   Does the patient have one of the following disease processes/diagnoses(primary or secondary)? General Surgery   Week 1 attempt successful? No   Unsuccessful attempts Attempt 1            Yun REAL - Registered Nurse

## 2023-12-12 ENCOUNTER — READMISSION MANAGEMENT (OUTPATIENT)
Dept: CALL CENTER | Facility: HOSPITAL | Age: 68
End: 2023-12-12
Payer: MEDICARE

## 2023-12-12 NOTE — PROGRESS NOTES
"Enter Query Response Below      Query Response: obstructive jaundice due to suspected choledocholithiasis with recently passed gallstone              If applicable, please update the problem list.     Patient: Evan Salazar \"Pedro\"        : 1955  Account: [id number]           Admit Date: 2023        How to Respond to this query:       a. Click New Note     b. Answer query within the yellow box.                c. Update the Problem List, if applicable.      If you have any questions about this query contact me at: [email or web address]     Dr. Gore    68-year-old male admitted 23 with \"Obstructive jaundice, improving. MRCP 2023 with biliary sludge with no evidence of choledocholithiasis\" per the H&P. The op note states \"Chronic inflammation of the gallbladder with very friable cystic duct\".    Please clarify the following:   Obstructive jaundice ruled in, please clarify the source of obstruction: _______   Obstructive jaundice ruled out  Other- specify______  Unable to determine    By submitting this query, we are merely seeking further clarification of documentation to accurately reflect all conditions that you are monitoring, evaluating, treating or that extend the hospitalization or utilize additional resources of care. Please utilize your independent clinical judgment when addressing the question(s) above.     This query and your response, once completed, will be entered into the legal medical record.    Sincerely,  Rajwinder SPENCE, RN, CCDS  Clinical Documentation Integrity Program     "

## 2023-12-12 NOTE — OUTREACH NOTE
General Surgery Week 2 Survey      Flowsheet Row Responses   [facility] patient discharged from? [place]   Does the patient have one of the following disease processes/diagnoses(primary or secondary)? General Surgery   Week 2 attempt successful? Yes   Call start time 1327   Call end time 1328   Discharge diagnosis fredrick méndez   Person spoke with today (if not patient) and relationship pt   Does the patient have a follow up appointment scheduled with their surgeon? Yes   Comments Seen Surgeon yesterday   Has home health visited the patient within 72 hours of discharge? N/A   Psychosocial issues? No   Did the patient receive a copy of their discharge instructions? Yes   Nursing interventions Reviewed instructions with patient   What is the patient's perception of their health status since discharge? Improving   Nursing interventions Nurse provided patient education   Is the patient /caregiver able to teach back basic post-op care? Keep incision areas clean,dry and protected   Is the patient/caregiver able to teach back signs and symptoms of incisional infection? Increased redness, swelling or pain at the incisonal site, Increased drainage or bleeding, Incisional warmth, Pus or odor from incision, Fever   Is the patient/caregiver able to teach back steps to recovery at home? Set small, achievable goals for return to baseline health, Rest and rebuild strength, gradually increase activity   Is the patient/caregiver able to teach back the hierarchy of who to call/visit for symptoms/problems? PCP, Specialist, Home health nurse, Urgent Care, ED, 911 Yes   Week 2 call completed? Yes   Graduated Yes   Is the patient interested in additional calls from an ambulatory ? No   Would this patient benefit from a Referral to Amb Social Work? No   Wrap up additional comments Patient reports doing well. No s/s of infection noted Seen surgeon yesterday. No questions or concerns.   Call end time 1328            Sharon LINARES  - Registered Nurse

## 2023-12-12 NOTE — PROGRESS NOTES
"Enter Query Response Below      Query Response: JENISE not supported             If applicable, please update the problem list.     Patient: Evan Salazar \"Pedro\"        : 1955  Account: [id number]           Admit Date: 2023        How to Respond to this query:       a. Click New Note     b. Answer query within the yellow box.                c. Update the Problem List, if applicable.      If you have any questions about this query contact me at: [email or web address]     Dr. Gore    68-year-old male with baseline creatinine 1 (H&P) admitted 23 with mild JENISE, per the H&P. Creatinine 1.35 (), 1.15 (12/3). Treatment: labs monitored, IV fluids    After study, was acute kidney injury (JENISE) clinically supported during this admission?  Acute kidney injury (JENISE) was supported with additional clinical indicators:____________  Acute kidney injury (JENISE) was not supported  Other- specify_____________  Unable to determine     JENISE is defined by KDIGO as any of the following:  Increase in creatinine > 0.3 mg/dl within 48 hours or less; or   Increase in creatinine level to > 1.5x baseline (historical or measure), which is known or presumed to have occurred within the prior 7 days   Urine volume <0.5 ml/kg/h for 6 hours.  Reference article: http://www.kdigo.org/clinical_practice_guidelines/pdf/KDIGO%20AKI%20Guideline.pdf (as published in [employer] Supplements, The Official Journal of the International Society of Nephrology, vol. 2, issue 1, 2012.)    By submitting this query, we are merely seeking further clarification of documentation to accurately reflect all conditions that you are monitoring, evaluating, treating or that extend the hospitalization or utilize additional resources of care. Please utilize your independent clinical judgment when addressing the question(s) above.     This query and your response, once completed, will be entered into the legal medical record.    Sincerely,  Rajwinder" Felipe MSN, RN, CCDS  Clinical Documentation Integrity Program

## 2024-02-08 RX ORDER — LISINOPRIL 5 MG/1
5 TABLET ORAL DAILY
Qty: 30 TABLET | Refills: 3 | Status: SHIPPED | OUTPATIENT
Start: 2024-02-08

## 2024-06-20 ENCOUNTER — OFFICE VISIT (OUTPATIENT)
Dept: CARDIOLOGY | Facility: CLINIC | Age: 69
End: 2024-06-20
Payer: MEDICARE

## 2024-06-20 VITALS
HEART RATE: 68 BPM | SYSTOLIC BLOOD PRESSURE: 148 MMHG | WEIGHT: 172.9 LBS | HEIGHT: 69 IN | BODY MASS INDEX: 25.61 KG/M2 | DIASTOLIC BLOOD PRESSURE: 82 MMHG | OXYGEN SATURATION: 97 % | RESPIRATION RATE: 18 BRPM

## 2024-06-20 DIAGNOSIS — J96.01 ACUTE RESPIRATORY FAILURE WITH HYPOXIA: ICD-10-CM

## 2024-06-20 DIAGNOSIS — E78.2 MIXED HYPERLIPIDEMIA: ICD-10-CM

## 2024-06-20 DIAGNOSIS — E11.9 TYPE 2 DIABETES MELLITUS WITHOUT COMPLICATION, WITHOUT LONG-TERM CURRENT USE OF INSULIN: ICD-10-CM

## 2024-06-20 DIAGNOSIS — I10 PRIMARY HYPERTENSION: Primary | ICD-10-CM

## 2024-06-20 RX ORDER — VALSARTAN 80 MG/1
80 TABLET ORAL DAILY
COMMUNITY

## 2024-06-20 RX ORDER — ATORVASTATIN CALCIUM 10 MG/1
10 TABLET, FILM COATED ORAL DAILY
COMMUNITY
Start: 2022-12-14 | End: 2024-06-20

## 2024-06-20 NOTE — PROGRESS NOTES
Office Note    Subjective     Evan Salazar is a 69 y.o. male who presents to day for 8-month follow-up      Active Problems:  Problem List Items Addressed This Visit          Cardiac and Vasculature    Primary hypertension - Primary    Relevant Medications    valsartan (DIOVAN) 80 MG tablet    Other Relevant Orders    ECG 12 Lead    Mixed hyperlipidemia       Endocrine and Metabolic    Type 2 diabetes mellitus without complication, without long-term current use of insulin       Pulmonary and Pneumonias    Acute respiratory failure with hypoxia    Relevant Orders    ECG 12 Lead       HPI  Patient is a pleasant 69-year-old gentleman past medical history significant for hypertension, hyperlipidemia.  Patient has been doing well.  Denies any chest pains or breathing problems.  Patient has not any dyspnea on exertion or proximal dyspnea.  Has mild lightheadedness when he gets up from sitting to standing position no blackouts.  No lower extremity edema.    PRIOR MEDS  Current Outpatient Medications on File Prior to Visit   Medication Sig Dispense Refill    cholecalciferol (VITAMIN D3) 25 MCG (1000 UT) tablet Take 1 tablet by mouth Daily.      esomeprazole (nexIUM) 40 MG capsule Take 1 capsule by mouth Every Morning Before Breakfast.      metFORMIN ER (GLUCOPHAGE-XR) 500 MG 24 hr tablet Take 2 tablets by mouth Daily With Breakfast & Dinner.      omega-3 acid ethyl esters (LOVAZA) 1 g capsule TAKE TWO CAPSULE BY MOUTH TWICE DAILY      SITagliptin (JANUVIA) 100 MG tablet Take 1 tablet by mouth Daily.      valsartan (DIOVAN) 80 MG tablet Take 1 tablet by mouth Daily.      [DISCONTINUED] atorvastatin (Lipitor) 10 MG tablet Take 1 tablet by mouth Daily. (Patient not taking: Reported on 6/20/2024)      [DISCONTINUED] lisinopril (PRINIVIL,ZESTRIL) 5 MG tablet TAKE 1 TABLET BY MOUTH DAILY (Patient not taking: Reported on 6/20/2024) 30 tablet 3    [DISCONTINUED] PHARMACY MEDS TO BED CONSULT Use Daily. (Patient not taking: Reported  "on 6/20/2024)       No current facility-administered medications on file prior to visit.       ALLERGIES  Bactrim [sulfamethoxazole-trimethoprim]    HISTORY  Past Medical History:   Diagnosis Date    Anxiety     Arthritis     Benign fibroma of prostate 11/28/2023    COVID-19 09/02/2021    Received Casirivimab,Imdevimab in the ED; not hospitalized    Epididymitis     GERD (gastroesophageal reflux disease)     Heartburn     Hydrocele     Kidney stone     Mixed hyperlipidemia 11/28/2023    Primary hypertension 04/04/2023    Type 2 diabetes mellitus     Vitamin D deficiency 11/28/2023       Social History     Socioeconomic History    Marital status:    Tobacco Use    Smoking status: Never     Passive exposure: Never    Smokeless tobacco: Never   Vaping Use    Vaping status: Never Used   Substance and Sexual Activity    Alcohol use: No    Drug use: No    Sexual activity: Yes     Partners: Female       Family History   Problem Relation Age of Onset    No Known Problems Father     Diabetes Mother     Hypertension Mother        Review of Systems   Respiratory:  Negative for apnea, chest tightness and shortness of breath.    Cardiovascular:  Negative for chest pain, palpitations and leg swelling.   Neurological:  Positive for light-headedness. Negative for seizures, syncope, facial asymmetry, speech difficulty and weakness.       Objective     VITALS: /82 (BP Location: Left arm, Patient Position: Sitting, Cuff Size: Adult)   Pulse 68   Resp 18   Ht 175.3 cm (69\")   Wt 78.4 kg (172 lb 14.4 oz)   SpO2 97%   BMI 25.53 kg/m²     LABS:   No visits with results within 3 Month(s) from this visit.   Latest known visit with results is:   Admission on 12/01/2023, Discharged on 12/03/2023   Component Date Value Ref Range Status    Glucose 12/01/2023 97  70 - 130 mg/dL Final    Glucose 12/01/2023 228 (H)  70 - 130 mg/dL Final    Glucose 12/02/2023 133 (H)  65 - 99 mg/dL Final    BUN 12/02/2023 21  8 - 23 mg/dL Final "    Creatinine 12/02/2023 1.35 (H)  0.76 - 1.27 mg/dL Final    Sodium 12/02/2023 139  136 - 145 mmol/L Final    Potassium 12/02/2023 4.4  3.5 - 5.2 mmol/L Final    Chloride 12/02/2023 107  98 - 107 mmol/L Final    CO2 12/02/2023 22.0  22.0 - 29.0 mmol/L Final    Calcium 12/02/2023 8.5 (L)  8.6 - 10.5 mg/dL Final    Total Protein 12/02/2023 5.5 (L)  6.0 - 8.5 g/dL Final    Albumin 12/02/2023 3.7  3.5 - 5.2 g/dL Final    ALT (SGPT) 12/02/2023 152 (H)  1 - 41 U/L Final    AST (SGOT) 12/02/2023 56 (H)  1 - 40 U/L Final    Alkaline Phosphatase 12/02/2023 212 (H)  39 - 117 U/L Final    Total Bilirubin 12/02/2023 1.5 (H)  0.0 - 1.2 mg/dL Final    Globulin 12/02/2023 1.8  gm/dL Final    Calculated Result    A/G Ratio 12/02/2023 2.1  g/dL Final    BUN/Creatinine Ratio 12/02/2023 15.6  7.0 - 25.0 Final    Anion Gap 12/02/2023 10.0  5.0 - 15.0 mmol/L Final    eGFR 12/02/2023 57.5 (L)  >60.0 mL/min/1.73 Final    Phosphorus 12/02/2023 3.7  2.5 - 4.5 mg/dL Final    Magnesium 12/02/2023 2.0  1.6 - 2.4 mg/dL Final    Hemoglobin A1C 12/02/2023 6.40 (H)  4.80 - 5.60 % Final    WBC 12/02/2023 5.56  3.40 - 10.80 10*3/mm3 Final    RBC 12/02/2023 3.87 (L)  4.14 - 5.80 10*6/mm3 Final    Hemoglobin 12/02/2023 11.7 (L)  13.0 - 17.7 g/dL Final    Hematocrit 12/02/2023 34.2 (L)  37.5 - 51.0 % Final    MCV 12/02/2023 88.4  79.0 - 97.0 fL Final    MCH 12/02/2023 30.2  26.6 - 33.0 pg Final    MCHC 12/02/2023 34.2  31.5 - 35.7 g/dL Final    RDW 12/02/2023 13.1  12.3 - 15.4 % Final    RDW-SD 12/02/2023 42.5  37.0 - 54.0 fl Final    MPV 12/02/2023 10.1  6.0 - 12.0 fL Final    Platelets 12/02/2023 181  140 - 450 10*3/mm3 Final    Neutrophil % 12/02/2023 62.4  42.7 - 76.0 % Final    Lymphocyte % 12/02/2023 22.1  19.6 - 45.3 % Final    Monocyte % 12/02/2023 10.1  5.0 - 12.0 % Final    Eosinophil % 12/02/2023 4.0  0.3 - 6.2 % Final    Basophil % 12/02/2023 0.7  0.0 - 1.5 % Final    Immature Grans % 12/02/2023 0.7 (H)  0.0 - 0.5 % Final    Neutrophils,  Absolute 12/02/2023 3.47  1.70 - 7.00 10*3/mm3 Final    Lymphocytes, Absolute 12/02/2023 1.23  0.70 - 3.10 10*3/mm3 Final    Monocytes, Absolute 12/02/2023 0.56  0.10 - 0.90 10*3/mm3 Final    Eosinophils, Absolute 12/02/2023 0.22  0.00 - 0.40 10*3/mm3 Final    Basophils, Absolute 12/02/2023 0.04  0.00 - 0.20 10*3/mm3 Final    Immature Grans, Absolute 12/02/2023 0.04  0.00 - 0.05 10*3/mm3 Final    nRBC 12/02/2023 0.0  0.0 - 0.2 /100 WBC Final    Glucose 12/02/2023 121  70 - 130 mg/dL Final    Glucose 12/02/2023 113  70 - 130 mg/dL Final    Case Report 12/02/2023    Final                    Value:Surgical Pathology Report                         Case: [id number]                                  Authorizing Provider:  France Weaver MD   Collected:           12/02/2023 05:08 PM          Ordering Location:     [facility]   Received:            12/04/2023 07:30 AM                                 OR                                                                           Pathologist:           Stevan Vernon MD                                                       Specimen:    Gallbladder                                                                                Clinical Information 12/02/2023    Final                    Value:This result contains rich text formatting which cannot be displayed here.    Final Diagnosis 12/02/2023    Final                    Value:This result contains rich text formatting which cannot be displayed here.    Gross Description 12/02/2023    Final                    Value:This result contains rich text formatting which cannot be displayed here.    Microscopic Description 12/02/2023    Final                    Value:This result contains rich text formatting which cannot be displayed here.    Glucose 12/02/2023 127  70 - 130 mg/dL Final    Glucose 12/02/2023 174 (H)  70 - 130 mg/dL Final    Glucose 12/03/2023 125 (H)  65 - 99 mg/dL Final    BUN 12/03/2023 16  8 - 23  mg/dL Final    Creatinine 12/03/2023 1.15  0.76 - 1.27 mg/dL Final    Sodium 12/03/2023 137  136 - 145 mmol/L Final    Potassium 12/03/2023 4.6  3.5 - 5.2 mmol/L Final    Slight hemolysis detected by analyzer. Result may be falsely elevated.    Chloride 12/03/2023 104  98 - 107 mmol/L Final    CO2 12/03/2023 20.0 (L)  22.0 - 29.0 mmol/L Final    Calcium 12/03/2023 8.4 (L)  8.6 - 10.5 mg/dL Final    Total Protein 12/03/2023 5.6 (L)  6.0 - 8.5 g/dL Final    Albumin 12/03/2023 3.6  3.5 - 5.2 g/dL Final    ALT (SGPT) 12/03/2023 139 (H)  1 - 41 U/L Final    AST (SGOT) 12/03/2023 68 (H)  1 - 40 U/L Final    Alkaline Phosphatase 12/03/2023 186 (H)  39 - 117 U/L Final    Total Bilirubin 12/03/2023 1.3 (H)  0.0 - 1.2 mg/dL Final    Globulin 12/03/2023 2.0  gm/dL Final    Calculated Result    A/G Ratio 12/03/2023 1.8  g/dL Final    BUN/Creatinine Ratio 12/03/2023 13.9  7.0 - 25.0 Final    Anion Gap 12/03/2023 13.0  5.0 - 15.0 mmol/L Final    eGFR 12/03/2023 69.8  >60.0 mL/min/1.73 Final    WBC 12/03/2023 8.55  3.40 - 10.80 10*3/mm3 Final    RBC 12/03/2023 3.85 (L)  4.14 - 5.80 10*6/mm3 Final    Hemoglobin 12/03/2023 11.3 (L)  13.0 - 17.7 g/dL Final    Hematocrit 12/03/2023 34.5 (L)  37.5 - 51.0 % Final    MCV 12/03/2023 89.6  79.0 - 97.0 fL Final    MCH 12/03/2023 29.4  26.6 - 33.0 pg Final    MCHC 12/03/2023 32.8  31.5 - 35.7 g/dL Final    RDW 12/03/2023 12.8  12.3 - 15.4 % Final    RDW-SD 12/03/2023 41.8  37.0 - 54.0 fl Final    MPV 12/03/2023 9.9  6.0 - 12.0 fL Final    Platelets 12/03/2023 183  140 - 450 10*3/mm3 Final    Glucose 12/03/2023 130  70 - 130 mg/dL Final    Glucose 12/03/2023 175 (H)  70 - 130 mg/dL Final         IMAGING:   No Images in the past 120 days found..  No results found for this or any previous visit.     No results found for this or any previous visit.          EXAM:  Constitutional:       General: Awake.      Appearance: Healthy appearance. Not in distress.   Eyes:      Conjunctiva/sclera:  Conjunctivae normal.   HENT:      Head: Normocephalic and atraumatic.      Nose: Nose normal.    Mouth/Throat:      Pharynx: Oropharynx is clear.   Neck:      Thyroid: Thyroid normal.      Vascular: No carotid bruit or JVD.   Pulmonary:      Effort: Pulmonary effort is normal.      Breath sounds: Normal breath sounds.   Chest:      Chest wall: Not tender to palpatation.   Cardiovascular:      PMI at left midclavicular line. Normal rate. Regular rhythm. Normal S1 with normal intensity. Normal S2 with normal intensity.       Murmurs: There is no murmur.      No gallop.  No rub.   Pulses:     Intact distal pulses.   Edema:     Peripheral edema absent.   Abdominal:      General: Bowel sounds are normal. There is no distension.      Palpations: Abdomen is soft. There is no hepatomegaly.      Tenderness: There is no abdominal tenderness.   Musculoskeletal: Normal range of motion.      Cervical back: Normal range of motion. Skin:     General: Skin is warm and dry. There is no cyanosis.      Coloration: Skin is not jaundiced.   Neurological:      Mental Status: Alert and oriented to person, place and time.      Motor: Motor function is intact.      Gait: Gait is intact.   Psychiatric:         Attention and Perception: Attention and perception normal.         Speech: Speech normal.         Behavior: Behavior is cooperative.         Cognition and Memory: Cognition and memory normal.         Judgment: Judgment normal.          Procedure     ECG 12 Lead    Date/Time: 6/20/2024 3:36 PM  Performed by: Mami Pagan MD    Authorized by: Mami Pagan MD  Comparison: compared with previous ECG from 11/29/2023  Similar to previous ECG  Comments: EKG showed sinus rhythm at 68, normal axis, normal intervals             Assessment & Plan     Diagnoses and all orders for this visit:    1. Primary hypertension (Primary)  -     ECG 12 Lead    2. Acute respiratory failure with hypoxia  -     ECG 12 Lead    3. Type 2 diabetes mellitus  without complication, without long-term current use of insulin    4. Mixed hyperlipidemia          PLAN  #1 cardiac.  Patient with multiple risk factors for heart disease.  Has no angina symptoms.  Will continue to follow closely and clinically.  Normal echo in 2022.  2. hypertension.  Patient blood pressure is mildly up today.  Will have him check pressures at home and call us back.  Usually his blood pressure is within normal range as per patient  #3 hyperlipidemia.  Patient is taking fish oil.  He was on Lipitor in the past.  As per patient his primary keeps an eye on his lipids and they have been running normal  4.  bradycardia arrhythmia.  Patient has had COVID in the past and had palpitations and slow heart rate in the past.  That issue has resolved.  He has had no more bradycardias.           MEDS ORDERED DURING VISIT:    Medications Discontinued During This Encounter   Medication Reason    atorvastatin (Lipitor) 10 MG tablet *Therapy completed    lisinopril (PRINIVIL,ZESTRIL) 5 MG tablet *Therapy completed    PHARMACY MEDS TO BED CONSULT *Therapy completed        No orders of the defined types were placed in this encounter.        Follow Up:   Return in about 6 months (around 12/20/2024) for Recheck.    Patient was given instructions and counseling regarding his condition or for health maintenance advice. Please see specific information pulled into the AVS if appropriate.   As always, Sharon Dunn, DO  I appreciate very much the opportunity to participate in the cardiovascular care of your patients. Please do not hesitate to call me with any questions with regards to Evan Salazar evaluation and management.         This document has been electronically signed by Mami Pagan MD [facility], Interventional Cardiology  June 20, 2024 15:36 EDT    Dictated Utilizing Dragon Dictation: Part of this note may be an electronic transcription/translation of spoken language to printed text using the Dragon Dictation  System.

## (undated) DEVICE — SUT GUT CHRM 3/0 SH 27IN G122H

## (undated) DEVICE — ANTIBACTERIAL UNDYED BRAIDED (POLYGLACTIN 910), SYNTHETIC ABSORBABLE SUTURE: Brand: COATED VICRYL

## (undated) DEVICE — ENDOCUT SCISSOR TIP, DISPOSABLE: Brand: RENEW

## (undated) DEVICE — ENDOPATH XCEL UNIVERSAL TROCAR STABLILITY SLEEVES: Brand: ENDOPATH XCEL

## (undated) DEVICE — ENDOPATH XCEL BLADELESS TROCARS WITH STABILITY SLEEVES: Brand: ENDOPATH XCEL

## (undated) DEVICE — PAD GRND REM POLYHESIVE A/ DISP

## (undated) DEVICE — SUT VIC 0 TIES 54IN J608H

## (undated) DEVICE — GLV SURG SENSICARE PI ORTHO SZ7.5 LF STRL

## (undated) DEVICE — GAUZE FLUFF 1 PLY: Brand: DEROYAL

## (undated) DEVICE — ENDOPATH XCEL BLUNT TIP TROCARS WITH SMOOTH SLEEVES: Brand: ENDOPATH XCEL

## (undated) DEVICE — SUT MNCRYL PLS ANTIB UD 4/0 PS2 18IN

## (undated) DEVICE — SUP ATHL SUSP BOWER/BLACK PCH/POLY STRP/ELAS M/MD WHT

## (undated) DEVICE — [HIGH FLOW INSUFFLATOR,  DO NOT USE IF PACKAGE IS DAMAGED,  KEEP DRY,  KEEP AWAY FROM SUNLIGHT,  PROTECT FROM HEAT AND RADIOACTIVE SOURCES.]: Brand: PNEUMOSURE

## (undated) DEVICE — PK BASIC 70

## (undated) DEVICE — CATH CHOLANG 7.5F18IN BLU

## (undated) DEVICE — SYR LUERLOK 20CC BX/50

## (undated) DEVICE — DBD-DRAPE,LAP,CHOLE,W/TROUGHS,STERILE: Brand: MEDLINE

## (undated) DEVICE — PK LAP LASR CHOLE 10

## (undated) DEVICE — SNAP KOVER: Brand: UNBRANDED

## (undated) DEVICE — DRN PENRS SAFET/CLIP 1/2X12IN LF

## (undated) DEVICE — ANTIBACTERIAL VIOLET BRAIDED (POLYGLACTIN 910), SYNTHETIC ABSORBABLE SURGICAL SUTURE: Brand: COATED VICRYL

## (undated) DEVICE — ST EXT IV TBG W SECUR LK 20IN

## (undated) DEVICE — APPL CHLORAPREP TINTED 26ML TEAL

## (undated) DEVICE — LAPAROSCOPIC SMOKE FILTRATION SYSTEM: Brand: PALL LAPAROSHIELD® PLUS LAPAROSCOPIC SMOKE FILTRATION SYSTEM

## (undated) DEVICE — Device

## (undated) DEVICE — STPCK 4WY ON/OFF VLV M/COLAR FIT 45PSI STRL

## (undated) DEVICE — SKIN AFFIX SURG ADHESIVE 72/CS 0.55ML: Brand: MEDLINE

## (undated) DEVICE — ENCORE® LATEX MICRO SIZE 8, STERILE LATEX POWDER-FREE SURGICAL GLOVE: Brand: ENCORE

## (undated) DEVICE — TRY SKINPREP PVP SCRB W PAINT

## (undated) DEVICE — SPNG GZ WOVN 4X4IN 12PLY 10/BX STRL

## (undated) DEVICE — ANTIBACTERIAL UNDYED BRAIDED (POLYGLACTIN 910), SYNTHETIC ABSORBABLE SURGICAL SUTURE: Brand: COATED VICRYL

## (undated) DEVICE — ENDOPOUCH RETRIEVER SPECIMEN RETRIEVAL BAGS: Brand: ENDOPOUCH RETRIEVER

## (undated) DEVICE — DRSNG PAD ABD 8X10IN STRL

## (undated) DEVICE — SYR LUERLOK 30CC

## (undated) DEVICE — SPNG DRN AMD EXCILON 6PLY 4X4IN PK/2